# Patient Record
Sex: MALE | Race: WHITE | NOT HISPANIC OR LATINO | Employment: OTHER | URBAN - METROPOLITAN AREA
[De-identification: names, ages, dates, MRNs, and addresses within clinical notes are randomized per-mention and may not be internally consistent; named-entity substitution may affect disease eponyms.]

---

## 2017-07-27 ENCOUNTER — OFFICE VISIT (OUTPATIENT)
Dept: MEDICAL GROUP | Facility: MEDICAL CENTER | Age: 62
End: 2017-07-27
Payer: COMMERCIAL

## 2017-07-27 VITALS
HEIGHT: 77 IN | SYSTOLIC BLOOD PRESSURE: 126 MMHG | OXYGEN SATURATION: 98 % | BODY MASS INDEX: 27.28 KG/M2 | HEART RATE: 62 BPM | TEMPERATURE: 97.2 F | WEIGHT: 231 LBS | RESPIRATION RATE: 16 BRPM | DIASTOLIC BLOOD PRESSURE: 68 MMHG

## 2017-07-27 DIAGNOSIS — I77.819 AORTIC DILATATION (HCC): ICD-10-CM

## 2017-07-27 DIAGNOSIS — Z00.00 ROUTINE GENERAL MEDICAL EXAMINATION AT A HEALTH CARE FACILITY: ICD-10-CM

## 2017-07-27 DIAGNOSIS — H81.09 MENIERE DISEASE, UNSPECIFIED LATERALITY: ICD-10-CM

## 2017-07-27 PROCEDURE — 99214 OFFICE O/P EST MOD 30 MIN: CPT | Performed by: NURSE PRACTITIONER

## 2017-07-27 RX ORDER — MECLIZINE HYDROCHLORIDE 25 MG/1
25 TABLET ORAL 3 TIMES DAILY PRN
Qty: 30 TAB | Refills: 0 | Status: SHIPPED | DISCHARGE
Start: 2017-07-27 | End: 2019-08-01

## 2017-07-27 ASSESSMENT — ENCOUNTER SYMPTOMS: DIZZINESS: 1

## 2017-07-27 ASSESSMENT — PATIENT HEALTH QUESTIONNAIRE - PHQ9: CLINICAL INTERPRETATION OF PHQ2 SCORE: 0

## 2017-07-27 NOTE — PROGRESS NOTES
"Subjective:      Tyler Live is a 62 y.o. male who presents with Establish Care            HPI Tyler Live is here today to establish care as well as requesting lab work and follow-up on aortic dilatation.    Patient reports he feels generally well but it does not appear he has done any routine lab work in a number of years. He does have history of a 4.1 cm dilated aortic root and ascending aorta for which he was supposed to do follow-up echocardiogram 2 years ago but admits he did not do so. He has no history of hypertension or dyslipidemia. He states he generally feels well and the only chronic problem he has his Ménière's disease for which she has been seen at neurology and ENT. He will use meclizine when needed and symptoms tend to be worse with certain movements in bed. His neurological workup apparently was normal. He takes no other medications. He does not smoke cigarettes.      Social History   Substance Use Topics   • Smoking status: Never Smoker    • Smokeless tobacco: Never Used   • Alcohol Use: Yes      Comment: rare     Current Outpatient Prescriptions   Medication Sig Dispense Refill   • meclizine (ANTIVERT) 25 MG Tab Take 1 Tab by mouth 3 times a day as needed. 30 Tab 0     No current facility-administered medications for this visit.     Family History   Problem Relation Age of Onset   • Cancer Father      melanoma   • Cancer Sister      breast   • Cancer Maternal Grandmother      kidney   • Cancer Maternal Grandfather      prostate   • Cancer Paternal Grandmother      liver   • Cancer Paternal Grandfather      throat   • Diabetes Neg Hx    • Heart Disease Neg Hx    • Stroke Neg Hx    • GI Mother    History reviewed. No pertinent past medical history.    Review of Systems   Neurological: Positive for dizziness.   All other systems reviewed and are negative.         Objective:     /68 mmHg  Pulse 62  Temp(Src) 36.2 °C (97.2 °F)  Resp 16  Ht 1.956 m (6' 5\")  Wt 104.781 kg (231 lb)  " BMI 27.39 kg/m2  SpO2 98%     Physical Exam   Constitutional: He is oriented to person, place, and time. He appears well-developed and well-nourished. No distress.   HENT:   Head: Normocephalic and atraumatic.   Right Ear: External ear normal.   Left Ear: External ear normal.   Nose: Nose normal.   Mouth/Throat: Oropharynx is clear and moist.   Eyes: Conjunctivae are normal. Right eye exhibits no discharge. Left eye exhibits no discharge.   Neck: Normal range of motion. Neck supple. No tracheal deviation present. No thyromegaly present.   Cardiovascular: Normal rate, regular rhythm and normal heart sounds.    No murmur heard.  Pulmonary/Chest: Effort normal and breath sounds normal. No respiratory distress. He has no wheezes. He has no rales.   Lymphadenopathy:     He has no cervical adenopathy.   Neurological: He is alert and oriented to person, place, and time. Coordination normal.   Skin: Skin is warm and dry. No rash noted. He is not diaphoretic. No erythema.   Psychiatric: He has a normal mood and affect. His behavior is normal. Judgment and thought content normal.   Nursing note and vitals reviewed.            CONCLUSIONS  Normal left ventricular size, thickness, systolic function, and   diastolic function.  Left ventricular ejection fraction is 60-65%.  Mild tricuspid regurgitation.  Dilated aortic root and ascending aorta, measuring up to 4.1 cm in   diameter.  Assessment/Plan:     1. Aortic dilatation (CMS-HCC)  Patient has aortic dilatation at 4.1 cm and I explained this usually requires a follow-up ultrasound in one year which was ordered but patient did not complete. He states he will do at this time and if it is grown in size he will do follow-up testing depending on the size. I explained that if it is above 5.5 cm, he will be referred to a vascular surgeon. He will always keep his blood pressure under control.  - US-AORTA; Future    2. Meniere disease, unspecified laterality  Patient currently on  meclizine which helps somewhat but he admits he has are pending ENT and neurology and there is not much more they can do for him.  - meclizine (ANTIVERT) 25 MG Tab; Take 1 Tab by mouth 3 times a day as needed.  Dispense: 30 Tab; Refill: 0    3. Routine general medical examination at a health care facility  I recommended doing lab work and then following up if anything is abnormal.  - COMP METABOLIC PANEL; Future  - PROSTATE SPECIFIC AG SCREENING; Future  - LIPID PROFILE; Future  - TSH; Future

## 2017-07-27 NOTE — MR AVS SNAPSHOT
"        Tyler Live   2017 3:20 PM   Office Visit   MRN: [id number]    Department:  [address] [place] [address]   Dept Phone:  [phone]    Description:  Male : 1955   Provider:  ROLAND Mullins           Reason for Visit     Establish Care general check up      Allergies as of 2017     No Known Allergies      You were diagnosed with     Meniere disease, unspecified laterality   [719104]       Aortic dilatation (CMS-HCC)   [163637]       Routine general medical examination at a health care facility   [V70.0.ICD-9-CM]         Vital Signs     Blood Pressure Pulse Temperature Respirations Height Weight    126/68 mmHg 62 36.2 °C (97.2 °F) 16 1.956 m (6' 5\") 104.781 kg (231 lb)    Body Mass Index Oxygen Saturation Smoking Status             27.39 kg/m2 98% Never Smoker          Basic Information     Date Of Birth Sex Race Ethnicity Preferred Language    1955 Male White Non- English      Problem List              ICD-10-CM Priority Class Noted - Resolved    Aortic dilatation (CMS-HCC) I77.819   2015 - Present    Eczema L30.9   2015 - Present    Meniere disease H81.09   2017 - Present      Health Maintenance        Date Due Completion Dates    IMM DTaP/Tdap/Td Vaccine (1 - Tdap) 1974 ---    IMM ZOSTER VACCINE 2015 ---    IMM INFLUENZA (1) 2015    COLONOSCOPY 2021            Current Immunizations     Influenza Vaccine Quad Inj (Pf) 2015      Below and/or attached are the medications your provider expects you to take. Review all of your home medications and newly ordered medications with your provider and/or pharmacist. Follow medication instructions as directed by your provider and/or pharmacist. Please keep your medication list with you and share with your provider. Update the information when medications are discontinued, doses are changed, or new medications (including over-the-counter products) are added; and carry medication " information at all times in the event of emergency situations     Allergies:  No Known Allergies          Medications  Valid as of: July 27, 2017 -  3:43 PM    Generic Name Brand Name Tablet Size Instructions for use    Meclizine HCl (Tab) ANTIVERT 25 MG Take 1 Tab by mouth 3 times a day as needed.        .                 Medicines prescribed today were sent to:     [id number] - [place], [place] - [address]    [address] [place] [address]    Phone: [phone] Fax: [phone]    Open 24 Hours?: No      Medication refill instructions:       If your prescription bottle indicates you have medication refills left, it is not necessary to call your provider’s office. Please contact your pharmacy and they will refill your medication.    If your prescription bottle indicates you do not have any refills left, you may request refills at any time through one of the following ways: The online [employer] system (except Urgent Care), by calling your provider’s office, or by asking your pharmacy to contact your provider’s office with a refill request. Medication refills are processed only during regular business hours and may not be available until the next business day. Your provider may request additional information or to have a follow-up visit with you prior to refilling your medication.   *Please Note: Medication refills are assigned a new Rx number when refilled electronically. Your pharmacy may indicate that no refills were authorized even though a new prescription for the same medication is available at the pharmacy. Please request the medicine by name with the pharmacy before contacting your provider for a refill.        Your To Do List     Future Labs/Procedures Complete By Expires    COMP METABOLIC PANEL  As directed 7/28/2018    LIPID PROFILE  As directed 7/28/2018    PROSTATE SPECIFIC AG SCREENING  As directed 7/28/2018    TSH  As directed 7/28/2018    US-AORTA  As directed 1/27/2018         Braclet  Code: Activation code not generated  Current MyChart Status: Active

## 2017-08-04 LAB
ALBUMIN SERPL-MCNC: 4.2 G/DL (ref 3.6–4.8)
ALBUMIN/GLOB SERPL: 1.8 {RATIO} (ref 1.2–2.2)
ALP SERPL-CCNC: 60 IU/L (ref 39–117)
ALT SERPL-CCNC: 28 IU/L (ref 0–44)
AST SERPL-CCNC: 26 IU/L (ref 0–40)
BILIRUB SERPL-MCNC: 0.8 MG/DL (ref 0–1.2)
BUN SERPL-MCNC: 15 MG/DL (ref 8–27)
BUN/CREAT SERPL: 14 (ref 10–24)
CALCIUM SERPL-MCNC: 9 MG/DL (ref 8.6–10.2)
CHLORIDE SERPL-SCNC: 105 MMOL/L (ref 96–106)
CHOLEST SERPL-MCNC: 157 MG/DL (ref 100–199)
CO2 SERPL-SCNC: 23 MMOL/L (ref 18–29)
COMMENT 011824: ABNORMAL
CREAT SERPL-MCNC: 1.05 MG/DL (ref 0.76–1.27)
GLOBULIN SER CALC-MCNC: 2.3 G/DL (ref 1.5–4.5)
GLUCOSE SERPL-MCNC: 99 MG/DL (ref 65–99)
HDLC SERPL-MCNC: 34 MG/DL
LDLC SERPL CALC-MCNC: 90 MG/DL (ref 0–99)
POTASSIUM SERPL-SCNC: 4.4 MMOL/L (ref 3.5–5.2)
PROT SERPL-MCNC: 6.5 G/DL (ref 6–8.5)
PSA SERPL-MCNC: 1.5 NG/ML (ref 0–4)
SODIUM SERPL-SCNC: 143 MMOL/L (ref 134–144)
TRIGL SERPL-MCNC: 167 MG/DL (ref 0–149)
VLDLC SERPL CALC-MCNC: 33 MG/DL (ref 5–40)

## 2017-08-08 ENCOUNTER — HOSPITAL ENCOUNTER (OUTPATIENT)
Dept: RADIOLOGY | Facility: MEDICAL CENTER | Age: 62
End: 2017-08-08
Attending: NURSE PRACTITIONER
Payer: COMMERCIAL

## 2017-08-08 DIAGNOSIS — I77.819 AORTIC DILATATION (HCC): ICD-10-CM

## 2017-08-08 DIAGNOSIS — I77.810 DILATED AORTIC ROOT (HCC): ICD-10-CM

## 2017-08-08 PROCEDURE — 76775 US EXAM ABDO BACK WALL LIM: CPT

## 2017-08-10 DIAGNOSIS — I77.810 DILATED AORTIC ROOT (HCC): ICD-10-CM

## 2017-08-24 ENCOUNTER — HOSPITAL ENCOUNTER (OUTPATIENT)
Dept: CARDIOLOGY | Facility: MEDICAL CENTER | Age: 62
End: 2017-08-24
Attending: NURSE PRACTITIONER
Payer: COMMERCIAL

## 2017-08-24 DIAGNOSIS — I77.810 DILATED AORTIC ROOT (HCC): ICD-10-CM

## 2017-08-24 LAB
LV EJECT FRACT  99904: 65
LV EJECT FRACT MOD 2C 99903: 68.19
LV EJECT FRACT MOD 4C 99902: 65.31
LV EJECT FRACT MOD BP 99901: 66.79

## 2017-08-24 PROCEDURE — 93306 TTE W/DOPPLER COMPLETE: CPT

## 2017-08-24 PROCEDURE — 93306 TTE W/DOPPLER COMPLETE: CPT | Mod: 26 | Performed by: INTERNAL MEDICINE

## 2017-09-20 ENCOUNTER — RX ONLY (OUTPATIENT)
Age: 62
Setting detail: RX ONLY
End: 2017-09-20

## 2018-07-17 ENCOUNTER — OFFICE VISIT (OUTPATIENT)
Dept: MEDICAL GROUP | Facility: MEDICAL CENTER | Age: 63
End: 2018-07-17
Payer: COMMERCIAL

## 2018-07-17 VITALS
SYSTOLIC BLOOD PRESSURE: 120 MMHG | DIASTOLIC BLOOD PRESSURE: 72 MMHG | RESPIRATION RATE: 16 BRPM | HEART RATE: 75 BPM | TEMPERATURE: 97.7 F | WEIGHT: 231 LBS | OXYGEN SATURATION: 96 % | HEIGHT: 77 IN | BODY MASS INDEX: 27.28 KG/M2

## 2018-07-17 DIAGNOSIS — R22.31 MASS OF AXILLA, RIGHT: ICD-10-CM

## 2018-07-17 DIAGNOSIS — M54.2 NECK PAIN: ICD-10-CM

## 2018-07-17 DIAGNOSIS — M50.30 DDD (DEGENERATIVE DISC DISEASE), CERVICAL: ICD-10-CM

## 2018-07-17 DIAGNOSIS — I77.810 ASCENDING AORTA DILATATION (HCC): ICD-10-CM

## 2018-07-17 PROCEDURE — 99213 OFFICE O/P EST LOW 20 MIN: CPT | Performed by: NURSE PRACTITIONER

## 2018-07-17 ASSESSMENT — PATIENT HEALTH QUESTIONNAIRE - PHQ9: CLINICAL INTERPRETATION OF PHQ2 SCORE: 0

## 2018-07-17 ASSESSMENT — ENCOUNTER SYMPTOMS: NECK PAIN: 1

## 2018-07-17 NOTE — PROGRESS NOTES
Subjective:      Tyler Live is a 63 y.o. male who presents with Neck Pain (right side) and Shoulder Pain (right one)        CC: Patient here today for problems with neck pain and concerns about nodular area and right axilla.    HPI Tyler Live      1. Ascending aorta dilatation (HCC)  Previous imaging studies have shown slightly enlarged ascending aorta.    2. Neck pain  Patient reports that for about 4 weeks he has been having pain in his posterior neck into her right shoulder. The pain is mostly with standing up straight or walking and better at rest. He points to his posterior right side of his neck and posterior right shoulder as to where the pain occurs. It does not radiate down his arms and he reports no paresthesias or weakness of the arms. There is a MRI of the cervical spine in the chart from 2006 showing small central disc protrusions spinal stenosis and multilevel foraminal stenosis. He reports that he has back pain that comes and goes on occasion.    3. DDD (degenerative disc disease), cervical  Patient has history of degenerative disc disease of his lower and upper back for which he is not currently going to any specialty.    4. Mass of axilla, right  Patient states he has noticed on and off a nodular area and his right axilla. He states it does not cause him pain and he was seen in urgent care for this in the past but no further workup was done. He would like to assess the status of this.  Social History   Substance Use Topics   • Smoking status: Never Smoker   • Smokeless tobacco: Never Used   • Alcohol use Yes      Comment: rare     Current Outpatient Prescriptions   Medication Sig Dispense Refill   • meclizine (ANTIVERT) 25 MG Tab Take 1 Tab by mouth 3 times a day as needed. 30 Tab 0     No current facility-administered medications for this visit.      Family History   Problem Relation Age of Onset   • Cancer Father      melanoma   • GI Mother    • Cancer Sister      breast   •  "Cancer Maternal Grandmother      kidney   • Cancer Maternal Grandfather      prostate   • Cancer Paternal Grandmother      liver   • Cancer Paternal Grandfather      throat   • Diabetes Neg Hx    • Heart Disease Neg Hx    • Stroke Neg Hx    History reviewed. No pertinent past medical history.    Review of Systems   Musculoskeletal: Positive for neck pain.   All other systems reviewed and are negative.         Objective:     /72   Pulse 75   Temp 36.5 °C (97.7 °F)   Resp 16   Ht 1.956 m (6' 5\")   Wt 104.8 kg (231 lb)   SpO2 96%   BMI 27.39 kg/m²      Physical Exam   Constitutional: He is oriented to person, place, and time. He appears well-developed and well-nourished. No distress.   HENT:   Head: Normocephalic and atraumatic.   Right Ear: External ear normal.   Left Ear: External ear normal.   Nose: Nose normal.   Mouth/Throat: Oropharynx is clear and moist.   Eyes: Conjunctivae are normal. Right eye exhibits no discharge. Left eye exhibits no discharge.   Neck: Normal range of motion. Neck supple. No tracheal deviation present. No thyromegaly present.   Cardiovascular: Normal rate, regular rhythm and normal heart sounds.    No murmur heard.  Pulmonary/Chest: Effort normal and breath sounds normal. No respiratory distress. He has no wheezes. He has no rales.   Musculoskeletal:   Decreased range of motion of the neck with rotation, flexion and extension. Mild tenderness over the posterior neck and no shoulder tenderness. Patient fully able to elevate his arms bilaterally and there is no decreased strength of the arms bilaterally.   Lymphadenopathy:     He has no cervical adenopathy.   Neurological: He is alert and oriented to person, place, and time. Coordination normal.   Skin: Skin is warm and dry. No rash noted. He is not diaphoretic. No erythema.   Small, nontender nodular area of the right axilla.   Psychiatric: He has a normal mood and affect. His behavior is normal. Judgment and thought content " normal.   Nursing note and vitals reviewed.              Assessment/Plan:     1. Ascending aorta dilatation (HCC)  Patient previously showed aneurysmal dilatation of the mid abdomen aorta on ultrasound with echocardiogram from last year showing ascending aorta dilatation at 4.2 cm. His echocardiogram otherwise was fairly normal with ejection fraction of 65%. He is due for yearly follow-up.  - US-AORTA; Future    2. Neck pain  Patient demonstrates no weakness of the extremities so I will get him to physical therapy. If it does not improve I recommended physiatry. I will also order MRI in the future if necessary.  - REFERRAL TO PHYSICAL THERAPY Reason for Therapy: Eval/Treat/Report    3. DDD (degenerative disc disease), cervical  Patient to try physical therapy and follow up if no improvement.  - REFERRAL TO PHYSICAL THERAPY Reason for Therapy: Eval/Treat/Report    4. Mass of axilla, right  I will get ultrasound to rule out lipoma versus enlarged lymph node.  - US-BREAST COMPLETE-LEFT; Future

## 2018-07-30 DIAGNOSIS — I77.810 ASCENDING AORTA DILATATION (HCC): ICD-10-CM

## 2018-08-01 ENCOUNTER — HOSPITAL ENCOUNTER (OUTPATIENT)
Dept: RADIOLOGY | Facility: MEDICAL CENTER | Age: 63
End: 2018-08-01
Attending: NURSE PRACTITIONER
Payer: COMMERCIAL

## 2018-08-01 DIAGNOSIS — R22.31 MASS OF AXILLA, RIGHT: ICD-10-CM

## 2018-08-01 PROCEDURE — G0279 TOMOSYNTHESIS, MAMMO: HCPCS

## 2018-08-01 PROCEDURE — 76642 ULTRASOUND BREAST LIMITED: CPT | Mod: RT

## 2018-08-08 ENCOUNTER — PHYSICAL THERAPY (OUTPATIENT)
Dept: PHYSICAL THERAPY | Facility: REHABILITATION | Age: 63
End: 2018-08-08
Attending: NURSE PRACTITIONER
Payer: COMMERCIAL

## 2018-08-08 DIAGNOSIS — M50.30 DEGENERATION OF CERVICAL INTERVERTEBRAL DISC: ICD-10-CM

## 2018-08-08 DIAGNOSIS — M54.2 NECK PAIN: ICD-10-CM

## 2018-08-08 PROCEDURE — 97161 PT EVAL LOW COMPLEX 20 MIN: CPT

## 2018-08-08 PROCEDURE — 97012 MECHANICAL TRACTION THERAPY: CPT

## 2018-08-08 ASSESSMENT — ENCOUNTER SYMPTOMS
PAIN SCALE AT HIGHEST: 7
QUALITY: ACHING
QUALITY: TIGHT
PAIN TIMING: INTERMITTENT
PAIN SCALE: 1
PAIN SCALE AT LOWEST: 1

## 2018-08-08 NOTE — OP THERAPY EVALUATION
Outpatient Physical Therapy  INITIAL EVALUATION    [facility] Physical Therapy [address]  [address]  [place] [address]  Phone:  [phone]  Fax:  [phone]    Date of Evaluation: 2018    Patient: Tyler Live  YOB: 1955  MRN: [id number]     Referring Provider: ROLAND Mullins [facility] [address]  [place] [address]   Referring Diagnosis Neck pain [M54.2];DDD (degenerative disc disease), cervical [M50.30]     Time Calculation  Start time: 215  Stop time: 315 Time Calculation (min): 60 minutes     Physical Therapy Occurrence Codes    Date of onset of impairment:  18   Date physical therapy care plan established or reviewed:  18   Date physical therapy treatment started:  18          Chief Complaint: Neck Pain    Visit Diagnoses     ICD-10-CM   1. Neck pain M54.2   2. Degeneration of cervical intervertebral disc M50.30         Subjective   History of Present Illness:     Date of onset:  2018    History of chief complaint:  Pt started having sharp neck pain on right side about one and half months ago. 2 weeks of acute pain, then it started to improve with lifting some weights.   Pt denies N/T.  Pt states he's been sleeping on his right side for 2 years bc of BPPV when he rolls onto his left side.     Pain:     Current pain ratin    At best pain ratin    At worst pain ratin    Location:  Ct pain and upper trap pain    Quality:  Tight and aching    Pain timing:  Intermittent    Aggravating factors:  Bilateral rotations, right side lying, walking.    Relieving factors:  Sitting down, exercise.    Activity Tolerance:     Current activity tolerance / Recreational activities:  Runs a little, elliptical, UE machines 3-5x/week.     Work:   at [employer], 40 hours/week.     Social Support:     Lives in:  One-story house    Lives with:  Spouse    Hand Dominance:     Hand dominance:  Right    Diagnostic Tests:     No  diagnostic tests were previously performed      Treatments:     Treatments to date:  Exercise    Patient Goals:     Patient goals for therapy:  Improve mobility, eliminate pain.      No past medical history on file.  Past Surgical History:   Procedure Laterality Date   • COLONOSCOPY  age 50    neg   • EYE SURGERY  age 4    R eye,trauma       Precautions:       Objective   Observation and functional movement:  Head forward posture with kyphotic T/S.    Range of motion and strength:    Strength is within functional limits.    Cervical left rotation 45 degrees, right rotation 42 degrees, very minimal side bending bilat.     Sensation and reflexes:     Sensation is intact.      Reflexes are normal and symmetrical.    Palpation and joint mobility:     C5-7 PA's stiff +++, unilaterals at same level stiff.   T/S general stiff throughout.             Therapeutic Exercises (CPT 37755):     1. Foam roller : alt flexion, pec stretch, snow angels    2. [place]    Therapeutic Treatments and Modalities:     1. Mechanical Traction (CPT 97073), 18#/10# 60/20 with MHP    Time-based treatments/modalities:          Assessment, Response and Plan:   Impairments: abnormal or restricted ROM, impaired functional mobility and lacks appropriate home exercise program    Assessment details:  Mr. Live is a healthy 63 year old male with recent onset of C-spine pain with probable degenerative changes. Pt would benefit from skilled PT to address posture and mobility issues.   Prognosis: good    Goals:   Short Term Goals:   Pt able to demonstrate improved sitting and standing posture.  Pt independent with daily postural exercises.   Short term goal time span:  1-2 weeks      Long Term Goals:    Increase cervical rotation by 20-30 degrees.  Improve t/s mobility  Pt able to walk and exercise without neck pain  Neck disability to improve from 4 to 0.  Long term goal time span:  6-8 weeks    Plan:   Therapy options:  Physical therapy treatment to  continue  Planned therapy interventions:  E Stim Unattended (CPT 76738), Manual Therapy (CPT 01451), Mechanical Traction (CPT 18511) and Therapeutic Exercise (CPT 98525)  Other planned therapy interventions:  Consider dry needling  Frequency:  2x week  Duration in weeks:  6  Discussed with:  Patient      Functional Limitation G-Codes and Severity Modifiers  Neck Disability Total: 4             Referring provider co-signature:  I have reviewed this plan of care and my co-signature certifies the need for services.      Physician Signature: ________________________________ Date: ______________

## 2018-08-10 ENCOUNTER — HOSPITAL ENCOUNTER (OUTPATIENT)
Dept: CARDIOLOGY | Facility: MEDICAL CENTER | Age: 63
End: 2018-08-10
Attending: NURSE PRACTITIONER
Payer: COMMERCIAL

## 2018-08-10 DIAGNOSIS — I77.810 ASCENDING AORTA DILATATION (HCC): ICD-10-CM

## 2018-08-10 LAB
LV EJECT FRACT  99904: 65
LV EJECT FRACT MOD 2C 99903: 68.46
LV EJECT FRACT MOD 4C 99902: 63.97
LV EJECT FRACT MOD BP 99901: 66.28

## 2018-08-10 PROCEDURE — 93306 TTE W/DOPPLER COMPLETE: CPT

## 2018-08-10 PROCEDURE — 93306 TTE W/DOPPLER COMPLETE: CPT | Mod: 26 | Performed by: INTERNAL MEDICINE

## 2018-08-14 ENCOUNTER — PHYSICAL THERAPY (OUTPATIENT)
Dept: PHYSICAL THERAPY | Facility: REHABILITATION | Age: 63
End: 2018-08-14
Attending: NURSE PRACTITIONER
Payer: COMMERCIAL

## 2018-08-14 DIAGNOSIS — M50.30 DEGENERATION OF CERVICAL INTERVERTEBRAL DISC: ICD-10-CM

## 2018-08-14 DIAGNOSIS — M54.2 NECK PAIN: ICD-10-CM

## 2018-08-14 PROCEDURE — 97014 ELECTRIC STIMULATION THERAPY: CPT

## 2018-08-14 PROCEDURE — 97140 MANUAL THERAPY 1/> REGIONS: CPT

## 2018-08-14 PROCEDURE — 97110 THERAPEUTIC EXERCISES: CPT

## 2018-08-14 NOTE — OP THERAPY DAILY TREATMENT
Outpatient Physical Therapy  DAILY TREATMENT     [facility] Physical [address].  [address]  [place] [address]  Phone:  [phone]  Fax:  [phone]    Date: 08/14/2018    Patient: Tyler Live  YOB: 1955  MRN: [id number]     Time Calculation  Start time: 0415  Stop time: 0502 Time Calculation (min): 47 minutes     Chief Complaint: neck pain  Visit #: 2    SUBJECTIVE:  Pt states he has more pain with standing and he did some seated rows that hurt his neck and right shoulder.     OBJECTIVE:    Therapeutic Exercises (CPT 98557):     1. Foam roller: alt flexion, pec stretch, flasher    2. Mid rows, flasher    3. Prone angels, 2 x 10    4. Activator on wall    Therapeutic Treatments and Modalities:     1. Manual Therapy (CPT 60408), CT mobs and upper t/s rib mobs, and right 1st rib mobs    2. E Stim Unattended (CPT 91034), russian stim to ct junction with mHP    Time-based treatments/modalities:  Manual therapy minutes (CPT 35395): 15 minutes  Therapeutic exercise minutes (CPT 54808): 14 minutes       ASSESSMENT:   Mild increase in rotation post treatment(55 degrees)    PLAN/RECOMMENDATIONS:   Plan for treatment: therapy treatment to continue next visit.  Planned interventions for next visit: E-stim unattended (CPT 55816), manual therapy (CPT 17608), mechanical traction (CPT 02326) and therapeutic exercise (CPT 52877).

## 2018-08-17 ENCOUNTER — PHYSICAL THERAPY (OUTPATIENT)
Dept: PHYSICAL THERAPY | Facility: REHABILITATION | Age: 63
End: 2018-08-17
Attending: NURSE PRACTITIONER
Payer: COMMERCIAL

## 2018-08-17 DIAGNOSIS — M50.30 DEGENERATION OF CERVICAL INTERVERTEBRAL DISC: ICD-10-CM

## 2018-08-17 DIAGNOSIS — M54.2 NECK PAIN: ICD-10-CM

## 2018-08-17 PROCEDURE — 97140 MANUAL THERAPY 1/> REGIONS: CPT

## 2018-08-17 PROCEDURE — 97014 ELECTRIC STIMULATION THERAPY: CPT

## 2018-08-17 PROCEDURE — 97110 THERAPEUTIC EXERCISES: CPT

## 2018-08-17 NOTE — OP THERAPY DAILY TREATMENT
Outpatient Physical Therapy  DAILY TREATMENT     [facility] Physical [address].  [address]  [place] [address]  Phone:  [phone]  Fax:  [phone]    Date: 08/17/2018    Patient: Tyler Live  YOB: 1955  MRN: [id number]     Time Calculation  Start time: 0345  Stop time: 0431 Time Calculation (min): 46 minutes     Chief Complaint: neck pain  Visit #: 3    SUBJECTIVE:      OBJECTIVE:            Therapeutic Exercises (CPT 06474):     1. Foam roller: alt flexion, pec stretch, flasher    2. Mid rows, flasher    3. Prone angels, 2 x 10    4. Activator on wall    5. Box exercise, 2 x 10    Therapeutic Treatments and Modalities:     1. Manual Therapy (CPT 37782), CT mobs and upper t/s rib mobs, and right 1st rib mobs    2. E Stim Unattended (CPT 31046), russian stim to ct junction with mHP    Time-based treatments/modalities:  Manual therapy minutes (CPT 11058): 16 minutes  Therapeutic exercise minutes (CPT 94505): 14 minutes         ASSESSMENT:   Pt continues to have tightness in right upper trap and central CT pain.     PLAN/RECOMMENDATIONS:   Plan for treatment: therapy treatment to continue next visit.  Planned interventions for next visit: E-stim unattended (CPT 16790), manual therapy (CPT 04261), mechanical traction (CPT 17212) and therapeutic exercise (CPT 40447).pt will consider dry needling.

## 2018-08-21 ENCOUNTER — PHYSICAL THERAPY (OUTPATIENT)
Dept: PHYSICAL THERAPY | Facility: REHABILITATION | Age: 63
End: 2018-08-21
Attending: NURSE PRACTITIONER
Payer: COMMERCIAL

## 2018-08-21 DIAGNOSIS — M54.2 NECK PAIN: ICD-10-CM

## 2018-08-21 DIAGNOSIS — M50.30 DEGENERATION OF CERVICAL INTERVERTEBRAL DISC: ICD-10-CM

## 2018-08-21 PROCEDURE — 97014 ELECTRIC STIMULATION THERAPY: CPT

## 2018-08-21 PROCEDURE — 97140 MANUAL THERAPY 1/> REGIONS: CPT

## 2018-08-21 PROCEDURE — 97110 THERAPEUTIC EXERCISES: CPT

## 2018-08-21 NOTE — OP THERAPY DAILY TREATMENT
Outpatient Physical Therapy  DAILY TREATMENT     [facility] Physical [address]  [address]  [place] [address]  Phone:  [phone]  Fax:  [phone]    Date: 08/21/2018    Patient: Tyler Live  YOB: 1955  MRN: [id number]     Time Calculation  Start time: 0415  Stop time: 0502 Time Calculation (min): 47 minutes     Chief Complaint: neck pain    Visit #: 4    SUBJECTIVE:  Pt states the stimulation seemed to help some. Walking still makes my neck hurt.     OBJECTIVE:      Therapeutic Exercises (CPT 68873):     1. Foam roller: alt flexion, pec stretch, flasher    2. Mid rows, flasher    3. Prone angels on ball, 2 x 10    4. Activator on wall    5. Box exercise, 2 x 10    6. Reverse rows with trx, 3 x 10    Therapeutic Treatments and Modalities:     1. Manual Therapy (CPT 77713), CT mobs and upper t/s rib mobs, and right 1st rib mobs    2. E Stim Unattended (CPT 66610), russian stim to ct junction with mHP    Time-based treatments/modalities:  Manual therapy minutes (CPT 27539): 14 minutes  Therapeutic exercise minutes (CPT 23033): 16 minutes         ASSESSMENT:   pt's making gradual improvement with treatment.     PLAN/RECOMMENDATIONS:   Plan for treatment: therapy treatment to continue next visit.  Planned interventions for next visit: E-stim unattended (CPT 78249), manual therapy (CPT 28170) and mechanical traction (CPT 27226).

## 2018-08-24 ENCOUNTER — PHYSICAL THERAPY (OUTPATIENT)
Dept: PHYSICAL THERAPY | Facility: REHABILITATION | Age: 63
End: 2018-08-24
Attending: NURSE PRACTITIONER
Payer: COMMERCIAL

## 2018-08-24 DIAGNOSIS — M54.2 NECK PAIN: ICD-10-CM

## 2018-08-24 DIAGNOSIS — M50.30 DEGENERATION OF CERVICAL INTERVERTEBRAL DISC: ICD-10-CM

## 2018-08-24 PROCEDURE — 97140 MANUAL THERAPY 1/> REGIONS: CPT

## 2018-08-24 PROCEDURE — 97110 THERAPEUTIC EXERCISES: CPT

## 2018-08-24 PROCEDURE — 97014 ELECTRIC STIMULATION THERAPY: CPT

## 2018-08-24 NOTE — OP THERAPY DAILY TREATMENT
Outpatient Physical Therapy  DAILY TREATMENT     [facility] Physical [address].  [address]  [place] [address]  Phone:  [phone]  Fax:  [phone]    Date: 08/24/2018    Patient: Tyler Live  YOB: 1955  MRN: [id number]     Time Calculation  Start time: 0345  Stop time: 0432 Time Calculation (min): 47 minutes     Chief Complaint: neck pain  Visit #: 5    SUBJECTIVE:  My neck is a little better. The elliptical hurt my neck after 25 minutes.     OBJECTIVE:      Therapeutic Exercises (CPT 99036):     1. Foam roller: alt flexion, pec stretch, flasher    2. Horizontal abduction    3. Prone angels, 2 x 10    4. Elbows and knees HBB t/s rotation    5. Box exercise, 2 x 10    6. [place] on the wall    Therapeutic Treatments and Modalities:     1. Manual Therapy (CPT 53212), CT mobs and upper t/s rib mobs, and right 1st rib mobs    2. E Stim Unattended (CPT 39920), russian stim to ct junction with P    Time-based treatments/modalities:  Manual therapy minutes (CPT 97213): 14 minutes  Therapeutic exercise minutes (CPT 58885): 16 minutes    ASSESSMENT:   pt's making some improvement with treatment.     PLAN/RECOMMENDATIONS:   Plan for treatment: therapy treatment to continue next visit.  Planned interventions for next visit: E-stim unattended (CPT 50535), manual therapy (CPT 82727) and therapeutic exercise (CPT 58981).

## 2018-08-28 ENCOUNTER — PHYSICAL THERAPY (OUTPATIENT)
Dept: PHYSICAL THERAPY | Facility: REHABILITATION | Age: 63
End: 2018-08-28
Attending: NURSE PRACTITIONER
Payer: COMMERCIAL

## 2018-08-28 DIAGNOSIS — M54.2 NECK PAIN: ICD-10-CM

## 2018-08-28 DIAGNOSIS — M50.30 DEGENERATION OF CERVICAL INTERVERTEBRAL DISC: ICD-10-CM

## 2018-08-28 PROCEDURE — 97140 MANUAL THERAPY 1/> REGIONS: CPT

## 2018-08-28 PROCEDURE — 97014 ELECTRIC STIMULATION THERAPY: CPT

## 2018-08-28 PROCEDURE — 97110 THERAPEUTIC EXERCISES: CPT

## 2018-08-28 NOTE — OP THERAPY DAILY TREATMENT
Outpatient Physical Therapy  DAILY TREATMENT     [facility] [address].  [address]  [place] [address]  Phone:  [phone]  Fax:  [phone]    Date: 08/28/2018    Patient: Tyler Live  YOB: 1955  MRN: [id number]     Time Calculation  Start time: 0415  Stop time: 0502 Time Calculation (min): 47 minutes     Chief Complaint: neck pain  Visit #: 6    SUBJECTIVE:  Pt did some work on his house and it flared his neck up.     OBJECTIVE:            Therapeutic Exercises (CPT 11340):     1. Foam roller: alt flexion, pec stretch, flasher    2. Horizontal abduction    3. Prone angels, 2 x 10    4. Elbows and knees HBB t/s rotation    5. Box exercise, 2 x 10    Therapeutic Treatments and Modalities:     1. Manual Therapy (CPT 43281), CT mobs and upper t/s rib mobs, and right 1st rib mobs, taped shoulders into retraction    2. E Stim Unattended (CPT 21878), russian stim to ct junction with mHP    Time-based treatments/modalities:  Manual therapy minutes (CPT 02728): 15 minutes  Therapeutic exercise minutes (CPT 10655): 15 minutes     ASSESSMENT:   Pt continues to get int pain which i suspect is posturally related.     PLAN/RECOMMENDATIONS:   Plan for treatment: therapy treatment to continue next visit.  Planned interventions for next visit: E-stim unattended (CPT 50513), manual therapy (CPT 27416), mechanical traction (CPT 58965) and therapeutic exercise (CPT 41741).

## 2018-08-31 ENCOUNTER — PHYSICAL THERAPY (OUTPATIENT)
Dept: PHYSICAL THERAPY | Facility: REHABILITATION | Age: 63
End: 2018-08-31
Attending: NURSE PRACTITIONER
Payer: COMMERCIAL

## 2018-08-31 DIAGNOSIS — M54.2 NECK PAIN: ICD-10-CM

## 2018-08-31 DIAGNOSIS — M50.30 DEGENERATION OF CERVICAL INTERVERTEBRAL DISC: ICD-10-CM

## 2018-08-31 PROCEDURE — 97014 ELECTRIC STIMULATION THERAPY: CPT

## 2018-08-31 PROCEDURE — 97110 THERAPEUTIC EXERCISES: CPT

## 2018-08-31 PROCEDURE — 97140 MANUAL THERAPY 1/> REGIONS: CPT

## 2018-08-31 NOTE — OP THERAPY DAILY TREATMENT
Outpatient Physical Therapy  DAILY TREATMENT     [facility] [address]  [address]  [place] [address]  Phone:  [phone]  Fax:  [phone]    Date: 08/31/2018    Patient: Tyler Live  YOB: 1955  MRN: [id number]     Time Calculation  Start time: 0145  Stop time: 0232 Time Calculation (min): 47 minutes     Chief Complaint: neck pain  Visit #: 7    SUBJECTIVE:  Pt  States the tape helped with walking. When I took it off the pain returned.     OBJECTIVE:    Therapeutic Exercises (CPT 49988):     1. Foam roller: alt flexion, pec stretch, flasher    2. Horizontal abduction    3. Prone angels, 2 x 10    4. Elbows and knees HBB t/s rotation    5. Box exercise, 2 x 10    Therapeutic Treatments and Modalities:     1. Manual Therapy (CPT 57391), CT mobs and upper t/s rib mobs, and right 1st rib mobs,     2. E Stim Unattended (CPT 71410), russian stim to ct junction with mHP    Time-based treatments/modalities:  Manual therapy minutes (CPT 79498): 16 minutes  Therapeutic exercise minutes (CPT 62276): 14 minutes     ASSESSMENT:   Pt did well with tape and reinforcing good posture.     PLAN/RECOMMENDATIONS:   Plan for treatment: therapy treatment to continue next visit.  Patient will follow up with treatment after he returns from vacation in 2 weeks.

## 2018-09-04 ENCOUNTER — OFFICE VISIT (OUTPATIENT)
Dept: MEDICAL GROUP | Facility: PHYSICIAN GROUP | Age: 63
End: 2018-09-04
Payer: COMMERCIAL

## 2018-09-04 VITALS
TEMPERATURE: 98.1 F | SYSTOLIC BLOOD PRESSURE: 120 MMHG | DIASTOLIC BLOOD PRESSURE: 70 MMHG | HEART RATE: 61 BPM | OXYGEN SATURATION: 95 % | HEIGHT: 77 IN | WEIGHT: 229.79 LBS | BODY MASS INDEX: 27.13 KG/M2

## 2018-09-04 DIAGNOSIS — L82.1 SEBORRHEIC KERATOSES: ICD-10-CM

## 2018-09-04 DIAGNOSIS — D48.5 NEOPLASM OF UNCERTAIN BEHAVIOR OF SKIN: ICD-10-CM

## 2018-09-04 DIAGNOSIS — Z23 NEED FOR INFLUENZA VACCINATION: ICD-10-CM

## 2018-09-04 PROCEDURE — 99213 OFFICE O/P EST LOW 20 MIN: CPT | Mod: 25 | Performed by: FAMILY MEDICINE

## 2018-09-04 PROCEDURE — 90471 IMMUNIZATION ADMIN: CPT | Performed by: FAMILY MEDICINE

## 2018-09-04 PROCEDURE — 90686 IIV4 VACC NO PRSV 0.5 ML IM: CPT | Performed by: FAMILY MEDICINE

## 2018-09-05 NOTE — PROGRESS NOTES
"Subjective:      Tyler Live is a 63 y.o. male who presents with Other (nhung on back)            HPI     This is a 63-year-old white male patient of CHETAN Mcguire who is here because his wife has noticed suspicious spots on his back in the last 1-2 months.  He is worried because his father had melanoma at age 67 and  5 months after the diagnosis.  He follows up with skin cancer and dermat[facility] and he said he has an appointment in 2 months time.  He wants to make sure there is nothing urgent that needs to be treated.    He is otherwise healthy without any significant medical problems.    Past medical history, past surgical history, family history reviewed-no changes    Social history reviewed-no changes    Allergies reviewed-no changes    Medications reviewed-no changes    ROS     As per HPI, the rest are negative.       Objective:     /70   Pulse 61   Temp 36.7 °C (98.1 °F)   Ht 1.956 m (6' 5\")   Wt 104.2 kg (229 lb 12.6 oz)   SpO2 95%   BMI 27.25 kg/m²      Physical Exam     Examined alert, awake, oriented, not in distress    Skin- he has several seborrheic keratosis lesions on his back with different shapes, sizes and color, no skin lesions in the midthoracic area of the back which are [id number] one 6 mm in size irregular shape and one next to it 3 mm in size round distinct margin both pinkish in color and shiny           Assessment/Plan:     1. Neoplasm of uncertain behavior of skin  Suspicious lesions on the mid back probably basal cell carcinoma.  Keep appointment with dermatologist in 2 months to get this further evaluated.  I do not see any lesions that are suspicious for melanoma that need urgent attention.  We discussed the ABCDE of skin lesions.  Discussed the need to use sunscreen on all sun exposed areas.    2. Seborrheic keratoses  Reassured patient the multiple skin lesions covering the back are benign seborrheic keratosis.     3. Need for influenza " vaccination  Flu shot was given today.  - INFLUENZA VACCINE QUAD INJ >3Y(PF)      Please note that this dictation was created using voice recognition software. I have worked with consultants from the vendor as well as technical experts from [facility] to optimize the interface. I have made every reasonable attempt to correct obvious errors, but I expect that there are errors of grammar and possibly content I did not discover before finalizing the note.

## 2018-09-24 ENCOUNTER — APPOINTMENT (RX ONLY)
Dept: URBAN - METROPOLITAN AREA CLINIC 20 | Facility: CLINIC | Age: 63
Setting detail: DERMATOLOGY
End: 2018-09-24

## 2018-09-24 DIAGNOSIS — L82.1 OTHER SEBORRHEIC KERATOSIS: ICD-10-CM

## 2018-09-24 DIAGNOSIS — Z71.89 OTHER SPECIFIED COUNSELING: ICD-10-CM

## 2018-09-24 DIAGNOSIS — L20.89 OTHER ATOPIC DERMATITIS: ICD-10-CM

## 2018-09-24 DIAGNOSIS — Z85.828 PERSONAL HISTORY OF OTHER MALIGNANT NEOPLASM OF SKIN: ICD-10-CM

## 2018-09-24 DIAGNOSIS — L81.4 OTHER MELANIN HYPERPIGMENTATION: ICD-10-CM

## 2018-09-24 DIAGNOSIS — L21.8 OTHER SEBORRHEIC DERMATITIS: ICD-10-CM

## 2018-09-24 DIAGNOSIS — D18.0 HEMANGIOMA: ICD-10-CM

## 2018-09-24 DIAGNOSIS — D22 MELANOCYTIC NEVI: ICD-10-CM

## 2018-09-24 PROBLEM — L20.84 INTRINSIC (ALLERGIC) ECZEMA: Status: ACTIVE | Noted: 2018-09-24

## 2018-09-24 PROBLEM — L57.0 ACTINIC KERATOSIS: Status: ACTIVE | Noted: 2018-09-24

## 2018-09-24 PROBLEM — D22.39 MELANOCYTIC NEVI OF OTHER PARTS OF FACE: Status: ACTIVE | Noted: 2018-09-24

## 2018-09-24 PROBLEM — D22.71 MELANOCYTIC NEVI OF RIGHT LOWER LIMB, INCLUDING HIP: Status: ACTIVE | Noted: 2018-09-24

## 2018-09-24 PROBLEM — D22.5 MELANOCYTIC NEVI OF TRUNK: Status: ACTIVE | Noted: 2018-09-24

## 2018-09-24 PROBLEM — D22.62 MELANOCYTIC NEVI OF LEFT UPPER LIMB, INCLUDING SHOULDER: Status: ACTIVE | Noted: 2018-09-24

## 2018-09-24 PROBLEM — D22.61 MELANOCYTIC NEVI OF RIGHT UPPER LIMB, INCLUDING SHOULDER: Status: ACTIVE | Noted: 2018-09-24

## 2018-09-24 PROBLEM — D22.4 MELANOCYTIC NEVI OF SCALP AND NECK: Status: ACTIVE | Noted: 2018-09-24

## 2018-09-24 PROBLEM — D22.72 MELANOCYTIC NEVI OF LEFT LOWER LIMB, INCLUDING HIP: Status: ACTIVE | Noted: 2018-09-24

## 2018-09-24 PROBLEM — D18.01 HEMANGIOMA OF SKIN AND SUBCUTANEOUS TISSUE: Status: ACTIVE | Noted: 2018-09-24

## 2018-09-24 PROCEDURE — ? COUNSELING

## 2018-09-24 PROCEDURE — ? OBSERVATION AND MEASURE

## 2018-09-24 PROCEDURE — ? PRESCRIPTION

## 2018-09-24 PROCEDURE — ? SUNSCREEN RECOMMENDATIONS

## 2018-09-24 PROCEDURE — 99214 OFFICE O/P EST MOD 30 MIN: CPT

## 2018-09-24 PROCEDURE — ? OBSERVATION

## 2018-09-24 RX ORDER — CLOBETASOL PROPIONATE 0.5 MG/G
CREAM TOPICAL BID
Qty: 1 | Refills: 3 | Status: ERX | COMMUNITY
Start: 2018-09-24

## 2018-09-24 RX ORDER — KETOCONAZOLE 20 MG/G
CREAM TOPICAL QD
Qty: 1 | Refills: 3 | Status: ERX | COMMUNITY
Start: 2018-09-24

## 2018-09-24 RX ORDER — CLOBETASOL PROPIONATE 0.5 MG/ML
SOLUTION TOPICAL
Qty: 1 | Refills: 2 | Status: ERX | COMMUNITY
Start: 2018-09-24

## 2018-09-24 RX ADMIN — CLOBETASOL PROPIONATE THIN LAYER: 0.5 CREAM TOPICAL at 00:00

## 2018-09-24 RX ADMIN — KETOCONAZOLE THIN LAYER: 20 CREAM TOPICAL at 00:00

## 2018-09-24 RX ADMIN — CLOBETASOL PROPIONATE SM AMT: 0.5 SOLUTION TOPICAL at 00:00

## 2018-09-24 ASSESSMENT — LOCATION DETAILED DESCRIPTION DERM
LOCATION DETAILED: RIGHT DISTAL POSTERIOR THIGH
LOCATION DETAILED: RIGHT DISTAL POSTERIOR UPPER ARM
LOCATION DETAILED: RIGHT VENTRAL PROXIMAL FOREARM
LOCATION DETAILED: RIGHT SUPERIOR UPPER BACK
LOCATION DETAILED: LEFT DISTAL POSTERIOR THIGH
LOCATION DETAILED: LEFT VENTRAL PROXIMAL FOREARM
LOCATION DETAILED: LEFT CRUS OF HELIX
LOCATION DETAILED: RIGHT INFERIOR MEDIAL MIDBACK
LOCATION DETAILED: INFERIOR THORACIC SPINE
LOCATION DETAILED: RIGHT PROXIMAL PRETIBIAL REGION
LOCATION DETAILED: RIGHT INFERIOR OCCIPITAL SCALP
LOCATION DETAILED: LEFT LATERAL PROXIMAL PRETIBIAL REGION
LOCATION DETAILED: LEFT PROXIMAL POSTERIOR UPPER ARM
LOCATION DETAILED: SUPERIOR THORACIC SPINE
LOCATION DETAILED: RIGHT INFERIOR CENTRAL MALAR CHEEK
LOCATION DETAILED: RIGHT INFERIOR MEDIAL UPPER BACK
LOCATION DETAILED: RIGHT INFERIOR FOREHEAD
LOCATION DETAILED: LEFT DISTAL PRETIBIAL REGION

## 2018-09-24 ASSESSMENT — LOCATION SIMPLE DESCRIPTION DERM
LOCATION SIMPLE: UPPER BACK
LOCATION SIMPLE: SCALP
LOCATION SIMPLE: RIGHT CHEEK
LOCATION SIMPLE: RIGHT POSTERIOR THIGH
LOCATION SIMPLE: LEFT PRETIBIAL REGION
LOCATION SIMPLE: LEFT POSTERIOR UPPER ARM
LOCATION SIMPLE: LEFT EAR
LOCATION SIMPLE: LEFT FOREARM
LOCATION SIMPLE: LEFT POSTERIOR THIGH
LOCATION SIMPLE: RIGHT PRETIBIAL REGION
LOCATION SIMPLE: RIGHT POSTERIOR UPPER ARM
LOCATION SIMPLE: RIGHT UPPER BACK
LOCATION SIMPLE: RIGHT FOREARM
LOCATION SIMPLE: RIGHT LOWER BACK
LOCATION SIMPLE: RIGHT FOREHEAD

## 2018-09-24 ASSESSMENT — LOCATION ZONE DERM
LOCATION ZONE: SCALP
LOCATION ZONE: FACE
LOCATION ZONE: TRUNK
LOCATION ZONE: EAR
LOCATION ZONE: ARM
LOCATION ZONE: LEG

## 2018-09-24 NOTE — PROCEDURE: OBSERVATION
Detail Level: Zone
Size Of Lesion In Cm (Optional): 0
Body Location Override (Optional - Billing Will Still Be Based On Selected Body Map Location If Applicable): back
Detail Level: Detailed
Size Of Lesion: 3mm x 3mm

## 2018-10-10 ENCOUNTER — TELEPHONE (OUTPATIENT)
Dept: PHYSICAL THERAPY | Facility: REHABILITATION | Age: 63
End: 2018-10-10

## 2018-12-12 ENCOUNTER — OFFICE VISIT (OUTPATIENT)
Dept: MEDICAL GROUP | Facility: MEDICAL CENTER | Age: 63
End: 2018-12-12
Payer: COMMERCIAL

## 2018-12-12 ENCOUNTER — HOSPITAL ENCOUNTER (OUTPATIENT)
Dept: RADIOLOGY | Facility: MEDICAL CENTER | Age: 63
End: 2018-12-12
Attending: NURSE PRACTITIONER
Payer: COMMERCIAL

## 2018-12-12 VITALS
HEIGHT: 77 IN | HEART RATE: 76 BPM | SYSTOLIC BLOOD PRESSURE: 118 MMHG | OXYGEN SATURATION: 99 % | BODY MASS INDEX: 27.87 KG/M2 | WEIGHT: 236 LBS | DIASTOLIC BLOOD PRESSURE: 68 MMHG | TEMPERATURE: 97 F | RESPIRATION RATE: 12 BRPM

## 2018-12-12 DIAGNOSIS — M54.41 CHRONIC RIGHT-SIDED LOW BACK PAIN WITH RIGHT-SIDED SCIATICA: ICD-10-CM

## 2018-12-12 DIAGNOSIS — G47.01 INSOMNIA DUE TO MEDICAL CONDITION: ICD-10-CM

## 2018-12-12 DIAGNOSIS — G89.29 CHRONIC RIGHT-SIDED LOW BACK PAIN WITH RIGHT-SIDED SCIATICA: ICD-10-CM

## 2018-12-12 DIAGNOSIS — Z23 NEED FOR TDAP VACCINATION: ICD-10-CM

## 2018-12-12 PROCEDURE — 73521 X-RAY EXAM HIPS BI 2 VIEWS: CPT

## 2018-12-12 PROCEDURE — 99214 OFFICE O/P EST MOD 30 MIN: CPT | Mod: 25 | Performed by: NURSE PRACTITIONER

## 2018-12-12 PROCEDURE — 90715 TDAP VACCINE 7 YRS/> IM: CPT | Performed by: NURSE PRACTITIONER

## 2018-12-12 PROCEDURE — 90471 IMMUNIZATION ADMIN: CPT | Performed by: NURSE PRACTITIONER

## 2018-12-12 PROCEDURE — 72100 X-RAY EXAM L-S SPINE 2/3 VWS: CPT

## 2018-12-12 RX ORDER — METHYLPREDNISOLONE 4 MG/1
TABLET ORAL
Qty: 21 TAB | Refills: 0 | Status: SHIPPED | OUTPATIENT
Start: 2018-12-12 | End: 2019-01-10

## 2018-12-12 RX ORDER — TRAZODONE HYDROCHLORIDE 50 MG/1
25-50 TABLET ORAL
Qty: 30 TAB | Refills: 5 | Status: SHIPPED | OUTPATIENT
Start: 2018-12-12 | End: 2019-01-23

## 2018-12-12 ASSESSMENT — ENCOUNTER SYMPTOMS
INSOMNIA: 1
BACK PAIN: 1

## 2018-12-12 NOTE — PROGRESS NOTES
"Subjective:      Tyler Live is a 63 y.o. male who presents with Hip Pain (4 months)        CC: Patient here today for complaints of right \"hip\" pain and insomnia.    HPI Tyler Live      1. Chronic right-sided low back pain with right-sided sciatica  Patient states for about 5 months he has had pain and what he feels is the right hip although he points also to the right gluteal area.  He states the pain almost always occurs at night when he lays on area.  There is no pain with activity.  He has recently been to physical therapy for his neck pain with some relief of symptoms it did not mention his lower back pain.  He denies loss of bowel or bladder control.  He denies weakness or tingling of the extremities.    2. Insomnia due to medical condition  Patient states he has had problems with insomnia for years.  He often takes melatonin or over-the-counter sleep aids.  He feels it has worsened with his hip pain.  He states he often falls asleep okay but wakes up during the night.  He states he does not feel he has sleep apnea symptoms which we discussed.    3. Need for Tdap vaccination  Patient due for vaccine.  Current Outpatient Prescriptions   Medication Sig Dispense Refill   • MethylPREDNISolone (MEDROL DOSEPAK) 4 MG Tablet Therapy Pack As directed on the packaging label. 21 Tab 0   • traZODone (DESYREL) 50 MG Tab Take 0.5-1 Tabs by mouth every bedtime. 30 Tab 5   • meclizine (ANTIVERT) 25 MG Tab Take 1 Tab by mouth 3 times a day as needed. 30 Tab 0     No current facility-administered medications for this visit.      Social History   Substance Use Topics   • Smoking status: Never Smoker   • Smokeless tobacco: Never Used   • Alcohol use Yes      Comment: rare     Family History   Problem Relation Age of Onset   • Cancer Father         melanoma   • GI Mother    • Cancer Sister         breast   • Cancer Maternal Grandmother         kidney   • Cancer Maternal Grandfather         prostate   • Cancer " "Paternal Grandmother         liver   • Cancer Paternal Grandfather         throat   • Diabetes Neg Hx    • Heart Disease Neg Hx    • Stroke Neg Hx    History reviewed. No pertinent past medical history.    Review of Systems   Musculoskeletal: Positive for back pain.   Psychiatric/Behavioral: The patient has insomnia.    All other systems reviewed and are negative.         Objective:     /68   Pulse 76   Temp 36.1 °C (97 °F)   Resp 12   Ht 1.956 m (6' 5\")   Wt 107 kg (236 lb)   SpO2 99%   BMI 27.99 kg/m²      Physical Exam   Constitutional: He is oriented to person, place, and time. He appears well-developed and well-nourished. No distress.   HENT:   Head: Normocephalic and atraumatic.   Right Ear: External ear normal.   Left Ear: External ear normal.   Nose: Nose normal.   Mouth/Throat: Oropharynx is clear and moist.   Eyes: Conjunctivae are normal. Right eye exhibits no discharge. Left eye exhibits no discharge.   Neck: Normal range of motion. Neck supple. No tracheal deviation present. No thyromegaly present.   Cardiovascular: Normal rate, regular rhythm and normal heart sounds.    No murmur heard.  Pulmonary/Chest: Effort normal and breath sounds normal. No respiratory distress. He has no wheezes. He has no rales.   Musculoskeletal:   Patient points to his right hip and right lower back area as to where his pain occurs.  There is no pain with pressure on the area or movement of the neck and hips.  Full strength of lower extremities.   Lymphadenopathy:     He has no cervical adenopathy.   Neurological: He is alert and oriented to person, place, and time. Coordination normal.   Skin: Skin is warm and dry. No rash noted. He is not diaphoretic. No erythema.   Psychiatric: He has a normal mood and affect. His behavior is normal. Judgment and thought content normal.   Nursing note and vitals reviewed.              Assessment/Plan:     1. Chronic right-sided low back pain with right-sided sciatica  I will " get x-rays of the hip and lumbar spine and explained his pain may be more sciatica related.  He will stop wearing his wallet in his right pocket.  Because symptoms have been going on for 5 months I am going to refer him to physiatry.  He has already been to physical therapy for his neck and does not feel it would be helpful.  I will put him on a Medrol Dosepak for 5 days and then he can use Tylenol for pain.  I am also putting him on trazodone for sleep which may help with the pain as well.  He will go to the emergency room if he develops weakness of the extremities or problems with bowel or bladder.  - REFERRAL TO PHYSIATRY (PMR)  - DX-HIP-BILATERAL-WITH PELVIS-2 VIEWS; Future  - DX-LUMBAR SPINE-2 OR 3 VIEWS; Future  - MethylPREDNISolone (MEDROL DOSEPAK) 4 MG Tablet Therapy Pack; As directed on the packaging label.  Dispense: 21 Tab; Refill: 0    2. Insomnia due to medical condition  Patient was given a handout on sleep aids and ways to help with sleep.  He does not feel he has sleep apnea but I did advise him to consider a referral to the sleep center if symptoms do not improve.  He may try trazodone at 1/2-1 tablet/day.  - traZODone (DESYREL) 50 MG Tab; Take 0.5-1 Tabs by mouth every bedtime.  Dispense: 30 Tab; Refill: 5    3. Need for Tdap vaccination  I have placed the below orders and discussed them with an approved delegating provider. The MA is performing the below orders under the direction of Dr. Rodriguez    - Tdap =>8yo IM

## 2019-01-10 ENCOUNTER — OFFICE VISIT (OUTPATIENT)
Dept: PHYSICAL MEDICINE AND REHAB | Facility: MEDICAL CENTER | Age: 64
End: 2019-01-10
Payer: COMMERCIAL

## 2019-01-10 VITALS
DIASTOLIC BLOOD PRESSURE: 78 MMHG | SYSTOLIC BLOOD PRESSURE: 110 MMHG | OXYGEN SATURATION: 95 % | BODY MASS INDEX: 27.49 KG/M2 | WEIGHT: 232.81 LBS | TEMPERATURE: 97.5 F | HEIGHT: 77 IN | HEART RATE: 64 BPM

## 2019-01-10 DIAGNOSIS — M25.652 DECREASED RANGE OF MOTION OF BOTH HIPS: ICD-10-CM

## 2019-01-10 DIAGNOSIS — M25.551 CHRONIC PAIN OF RIGHT HIP: ICD-10-CM

## 2019-01-10 DIAGNOSIS — G89.29 CHRONIC PAIN OF RIGHT HIP: ICD-10-CM

## 2019-01-10 DIAGNOSIS — M25.651 DECREASED RANGE OF MOTION OF BOTH HIPS: ICD-10-CM

## 2019-01-10 DIAGNOSIS — M70.61 GREATER TROCHANTERIC BURSITIS OF RIGHT HIP: ICD-10-CM

## 2019-01-10 DIAGNOSIS — M16.0 BILATERAL HIP JOINT ARTHRITIS: ICD-10-CM

## 2019-01-10 DIAGNOSIS — H81.09 MENIERE'S DISEASE, UNSPECIFIED LATERALITY: ICD-10-CM

## 2019-01-10 PROCEDURE — 99205 OFFICE O/P NEW HI 60 MIN: CPT | Performed by: PHYSICAL MEDICINE & REHABILITATION

## 2019-01-10 RX ORDER — MELOXICAM 7.5 MG/1
15 TABLET ORAL DAILY
Qty: 60 TAB | Refills: 0 | Status: SHIPPED | OUTPATIENT
Start: 2019-01-10 | End: 2019-01-23

## 2019-01-10 ASSESSMENT — PAIN SCALES - GENERAL: PAINLEVEL: 4=SLIGHT-MODERATE PAIN

## 2019-01-10 ASSESSMENT — PATIENT HEALTH QUESTIONNAIRE - PHQ9: CLINICAL INTERPRETATION OF PHQ2 SCORE: 0

## 2019-01-10 NOTE — Clinical Note
Dear Abdirahman Farias, A.HILDA. ,   Thank you for the referral of Tyler Echeverria Calos.  I believe his primary issue is greater trochanteric syndrome superimposed on hip impingement syndrome and hip arthritis.  I have initiated treatment and I will continue to follow-up with the patient.  Please see my note for more details    Should you have any questions or concerns please do not hesitate to contact me.  Mert Onofre M.D.

## 2019-01-21 ENCOUNTER — APPOINTMENT (OUTPATIENT)
Dept: PHYSICAL MEDICINE AND REHAB | Facility: MEDICAL CENTER | Age: 64
End: 2019-01-21
Payer: COMMERCIAL

## 2019-01-23 ENCOUNTER — OFFICE VISIT (OUTPATIENT)
Dept: URGENT CARE | Facility: CLINIC | Age: 64
End: 2019-01-23
Payer: COMMERCIAL

## 2019-01-23 VITALS
BODY MASS INDEX: 27.84 KG/M2 | SYSTOLIC BLOOD PRESSURE: 126 MMHG | TEMPERATURE: 97.1 F | OXYGEN SATURATION: 96 % | DIASTOLIC BLOOD PRESSURE: 86 MMHG | HEIGHT: 77 IN | HEART RATE: 70 BPM | WEIGHT: 235.8 LBS | RESPIRATION RATE: 16 BRPM

## 2019-01-23 DIAGNOSIS — J02.9 VIRAL PHARYNGITIS: ICD-10-CM

## 2019-01-23 LAB
INT CON NEG: NORMAL
INT CON POS: NORMAL
S PYO AG THROAT QL: NEGATIVE

## 2019-01-23 PROCEDURE — 87880 STREP A ASSAY W/OPTIC: CPT | Performed by: NURSE PRACTITIONER

## 2019-01-23 PROCEDURE — 99213 OFFICE O/P EST LOW 20 MIN: CPT | Performed by: NURSE PRACTITIONER

## 2019-01-23 ASSESSMENT — ENCOUNTER SYMPTOMS
MYALGIAS: 0
ABDOMINAL PAIN: 0
SHORTNESS OF BREATH: 0
CHILLS: 0
SWOLLEN GLANDS: 0
RHINORRHEA: 0
VOMITING: 0
NAUSEA: 0
TROUBLE SWALLOWING: 0
HOARSE VOICE: 0
DIZZINESS: 0
SORE THROAT: 1
EYE PAIN: 0
FEVER: 0

## 2019-01-23 NOTE — PROGRESS NOTES
"Subjective:   Tyler Live is a 63 y.o. male who presents for Pharyngitis (x 2-3 days, sore throat, pain to swallow)        Pharyngitis    This is a new problem. Episode onset: 2-3 dayd. The problem has been unchanged. Neither side of throat is experiencing more pain than the other. There has been no fever. The pain is at a severity of 2/10. The pain is mild. Associated symptoms include congestion. Pertinent negatives include no abdominal pain, drooling, ear discharge, ear pain, hoarse voice, plugged ear sensation, shortness of breath, swollen glands, trouble swallowing or vomiting. He has had no exposure to strep. He has tried acetaminophen for the symptoms. The treatment provided no relief.   URI    This is a new problem. The current episode started in the past 7 days. The problem has been unchanged. There has been no fever. Associated symptoms include congestion and a sore throat. Pertinent negatives include no abdominal pain, chest pain, ear pain, joint swelling, nausea, plugged ear sensation, rash, rhinorrhea, swollen glands or vomiting. He has tried nothing for the symptoms. The treatment provided no relief.     Review of Systems   Constitutional: Negative for chills and fever.   HENT: Positive for congestion and sore throat. Negative for drooling, ear discharge, ear pain, hoarse voice, rhinorrhea and trouble swallowing.    Eyes: Negative for pain.   Respiratory: Negative for shortness of breath.    Cardiovascular: Negative for chest pain.   Gastrointestinal: Negative for abdominal pain, nausea and vomiting.   Genitourinary: Negative for hematuria.   Musculoskeletal: Negative for myalgias.   Skin: Negative for rash.   Neurological: Negative for dizziness.     No Known Allergies   Objective:   /86 (BP Location: Left arm, Patient Position: Sitting, BP Cuff Size: Large adult)   Pulse 70   Temp 36.2 °C (97.1 °F) (Temporal)   Resp 16   Ht 1.956 m (6' 5\")   Wt 107 kg (235 lb 12.8 oz)   SpO2 96%  "  BMI 27.96 kg/m²   Physical Exam   Constitutional: He is oriented to person, place, and time. He appears well-developed and well-nourished. No distress.   HENT:   Head: Normocephalic and atraumatic.   Right Ear: Tympanic membrane normal.   Left Ear: Tympanic membrane normal.   Nose: Nose normal. Right sinus exhibits no maxillary sinus tenderness and no frontal sinus tenderness. Left sinus exhibits no maxillary sinus tenderness and no frontal sinus tenderness.   Mouth/Throat: Uvula is midline, oropharynx is clear and moist and mucous membranes are normal. No posterior oropharyngeal edema, posterior oropharyngeal erythema or tonsillar abscesses. No tonsillar exudate.   Eyes: Pupils are equal, round, and reactive to light. Conjunctivae and EOM are normal. Right eye exhibits no discharge. Left eye exhibits no discharge.   Cardiovascular: Normal rate and regular rhythm.    No murmur heard.  Pulmonary/Chest: Effort normal and breath sounds normal. No respiratory distress.   Abdominal: Soft. He exhibits no distension. There is no tenderness.   Lymphadenopathy:     He has no cervical adenopathy.   Neurological: He is alert and oriented to person, place, and time. He has normal reflexes. No sensory deficit.   Skin: Skin is warm, dry and intact.   Psychiatric: He has a normal mood and affect.         Assessment/Plan:     1. Viral pharyngitis  POCT Rapid Strep A     Strep negative    It was explained to the pt. Today that due to the viral nature of the pt's illness, we will treat symptomatically today. Encouraged OTC supportive meds PRN. Humidification, increase fluids  Patient given precautionary s/sx that mandate immediate follow up and evaluation in the ED. Advised of risks of not doing so.    DDX, Supportive care, and indications for immediate follow-up discussed with patient.    Instructed to return to clinic or nearest emergency department if we are not available for any change in condition, further concerns, or  worsening of symptoms.    The patient demonstrated a good understanding and agreed with the treatment plan.

## 2019-01-24 ENCOUNTER — OFFICE VISIT (OUTPATIENT)
Dept: PHYSICAL MEDICINE AND REHAB | Facility: MEDICAL CENTER | Age: 64
End: 2019-01-24
Payer: COMMERCIAL

## 2019-01-24 VITALS
WEIGHT: 234.79 LBS | TEMPERATURE: 97.9 F | SYSTOLIC BLOOD PRESSURE: 128 MMHG | DIASTOLIC BLOOD PRESSURE: 84 MMHG | BODY MASS INDEX: 27.72 KG/M2 | HEIGHT: 77 IN | HEART RATE: 69 BPM | OXYGEN SATURATION: 97 %

## 2019-01-24 DIAGNOSIS — M70.61 GREATER TROCHANTERIC BURSITIS OF RIGHT HIP: ICD-10-CM

## 2019-01-24 PROCEDURE — 20611 DRAIN/INJ JOINT/BURSA W/US: CPT | Performed by: PHYSICAL MEDICINE & REHABILITATION

## 2019-01-24 RX ORDER — DEXAMETHASONE SODIUM PHOSPHATE 4 MG/ML
4 INJECTION, SOLUTION INTRA-ARTICULAR; INTRALESIONAL; INTRAMUSCULAR; INTRAVENOUS; SOFT TISSUE ONCE
Status: COMPLETED | OUTPATIENT
Start: 2019-01-24 | End: 2019-01-24

## 2019-01-24 RX ADMIN — DEXAMETHASONE SODIUM PHOSPHATE 4 MG: 4 INJECTION, SOLUTION INTRA-ARTICULAR; INTRALESIONAL; INTRAMUSCULAR; INTRAVENOUS; SOFT TISSUE at 09:11

## 2019-01-24 ASSESSMENT — PATIENT HEALTH QUESTIONNAIRE - PHQ9: CLINICAL INTERPRETATION OF PHQ2 SCORE: 0

## 2019-01-24 NOTE — PROCEDURES
Date of Service: 1/24/2019    Physician/s: Mert Onofre MD    Pre-operative Diagnosis: RIGHT  greater trochanteric bursitis    Post-operative Diagnosis: RIGHT  greater trochanteric bursitis    Procedure: RIGHT  greater trochanteric bursa injection ultrasound-guided    Description of procedure:    The risks, benefits, and alternatives of the procedure were reviewed and discussed with the patient.  Written informed consent was freely obtained. A pre-procedural time-out was conducted by the physician verifying patient’s identity, procedure to be performed, procedure site and side, and allergy verification. Appropriate equipment was determined to be in place for the procedure.     No sedation was used for this procedure.     In the office suite the patient was placed in a lateral position with his RIGHT  side up, and the skin was prepped and draped in the usual sterile fashion. The ultrasound probe was placed over the RIGHT  greater trochanter and the joint space was located and the target for injection was marked.  A 25g 3.5 inch needle was placed into skin and advanced under ultrasound guidance into the greater trochanteric bursa. Following negative aspiration, approx 4cc of 1% lidocaine with 1cc of  4mg/mL dexamethasone was then injected into the joint, and the needle was subsequently removed intact after restyleted. The patient's hip was wiped with a 4x4 gauze, the area was cleansed with alcohol prep, and a bandaid was applied. There were no complications noted. The images were saved for permanent storage.     Preprocedural pain: 3/10  Postprocedural pain: 0/10    Mert Onofre MD  Physical Medicine and Rehabilitation  Interventional Spine and Sports Physiatry  [facility]

## 2019-02-03 ENCOUNTER — HOSPITAL ENCOUNTER (EMERGENCY)
Facility: MEDICAL CENTER | Age: 64
End: 2019-02-04
Attending: EMERGENCY MEDICINE
Payer: COMMERCIAL

## 2019-02-03 DIAGNOSIS — S05.02XA ABRASION OF LEFT CORNEA, INITIAL ENCOUNTER: ICD-10-CM

## 2019-02-03 PROCEDURE — 700101 HCHG RX REV CODE 250

## 2019-02-03 RX ORDER — PROPARACAINE HYDROCHLORIDE 5 MG/ML
SOLUTION/ DROPS OPHTHALMIC
Status: COMPLETED
Start: 2019-02-03 | End: 2019-02-03

## 2019-02-03 RX ORDER — PROPARACAINE HYDROCHLORIDE 5 MG/ML
1 SOLUTION/ DROPS OPHTHALMIC ONCE
Status: COMPLETED | OUTPATIENT
Start: 2019-02-04 | End: 2019-02-04

## 2019-02-03 RX ORDER — ERYTHROMYCIN 5 MG/G
OINTMENT OPHTHALMIC ONCE
Status: COMPLETED | OUTPATIENT
Start: 2019-02-04 | End: 2019-02-04

## 2019-02-03 RX ADMIN — FLUORESCEIN SODIUM: 0.6 STRIP OPHTHALMIC at 23:45

## 2019-02-03 RX ADMIN — PROPARACAINE HYDROCHLORIDE 1 DROP: 5 SOLUTION/ DROPS OPHTHALMIC at 23:45

## 2019-02-04 VITALS
HEART RATE: 75 BPM | DIASTOLIC BLOOD PRESSURE: 92 MMHG | TEMPERATURE: 97.1 F | SYSTOLIC BLOOD PRESSURE: 148 MMHG | BODY MASS INDEX: 27.57 KG/M2 | OXYGEN SATURATION: 96 % | RESPIRATION RATE: 18 BRPM | WEIGHT: 233.47 LBS | HEIGHT: 77 IN

## 2019-02-04 PROCEDURE — 99283 EMERGENCY DEPT VISIT LOW MDM: CPT

## 2019-02-04 PROCEDURE — 700101 HCHG RX REV CODE 250: Performed by: EMERGENCY MEDICINE

## 2019-02-04 RX ADMIN — PROPARACAINE HYDROCHLORIDE 1 DROP: 5 SOLUTION/ DROPS OPHTHALMIC at 00:15

## 2019-02-04 RX ADMIN — ERYTHROMYCIN: 5 OINTMENT OPHTHALMIC at 00:15

## 2019-02-04 NOTE — ED NOTES
All lines and monitors discontinued. Discharge instructions given, questions answered.    ambulatory out of ER, escorted by self.  Instructed not to drive after taking pain medication and pt verbalizes understanding.

## 2019-02-04 NOTE — ED PROVIDER NOTES
ED Provider Note        CHIEF COMPLAINT  Chief Complaint   Patient presents with   • Eye Injury     left eye, tree branch struck him in the eye, occurred 1300 today       HPI  Tyler Live is a 63 y.o. male who presents with a chief complaint of left eye pain.  It started today at approximately 2-3 PM.  He had no further treatment until he told his wife who looked at his eye noticed it to be red and came to the emergency department.  Pain is in the left thigh.  Its not necessarily worse with movement.  Not better with closing his eyes associate with some intermittent tearing no blurred vision no change in vision.  Patient does have a past visual ophthalmology history of cataract surgery.  Patient was clearing up his yard of branches.  [place] the [place] taking out of his car/truck it hit him in the eye.  He does not wear glasses or contacts.  Hit was not at high speed.    REVIEW OF SYSTEMS  General: No fever or chills.  Eyes: Clear eye discharge.  See above  Ear nose throat: No nasal pain  Dermatologic: No facial lacerations        PAST MEDICAL HISTORY  No past medical history on file.    FAMILY HISTORY  Family History   Problem Relation Age of Onset   • Cancer Father         melanoma   • GI Mother    • Cancer Sister         breast   • Cancer Maternal Grandmother         kidney   • Cancer Maternal Grandfather         prostate   • Cancer Paternal Grandmother         liver   • Cancer Paternal Grandfather         throat   • Diabetes Neg Hx    • Heart Disease Neg Hx    • Stroke Neg Hx        SOCIAL HISTORY  Social History     Social History   • Marital status:      Spouse name: N/A   • Number of children: N/A   • Years of education: N/A     Social History Main Topics   • Smoking status: Never Smoker   • Smokeless tobacco: Never Used   • Alcohol use Yes      Comment: rare   • Drug use: No   • Sexual activity: Yes     Partners: Female      Comment: ,  [facility]  "    Other Topics Concern   •  Service No   • Blood Transfusions No   • Caffeine Concern No   • Occupational Exposure No   • Hobby Hazards No   • Sleep Concern Yes   • Stress Concern No   • Weight Concern No   • Special Diet No   • Back Care No   • Exercise Yes   • Bike Helmet No   • Seat Belt Yes   • Self-Exams Yes     Social History Narrative   • No narrative on file       SURGICAL HISTORY  Past Surgical History:   Procedure Laterality Date   • CATARACT EXTRACTION WITH IOL Bilateral    • COLONOSCOPY  age 50    neg   • EYE SURGERY  age 4    R eye,trauma       CURRENT MEDICATIONS  Home Medications     Reviewed by Michelet Mir R.N. (Registered Nurse) on 02/03/19 at 2224  Med List Status: Complete   Medication Last Dose Status   meclizine (ANTIVERT) 25 MG Tab  Active                ALLERGIES  No Known Allergies    PHYSICAL EXAM  VITAL SIGNS: /100   Pulse 77   Temp 36.2 °C (97.1 °F) (Temporal)   Resp 18   Ht 1.956 m (6' 5\")   Wt 105.9 kg (233 lb 7.5 oz)   SpO2 95%   BMI 27.69 kg/m²  Room air O2: 95    Constitutional: Well developed, Well nourished, No acute distress, Non-toxic appearance.   HENT: Normocephalic, Atraumatic, Bilateral external ears normal, Oropharynx moist, No oral exudates, Nose normal.   Eyes: Pupils are equal round reactive to light.  Anicteric sclera there is no pain on contralateral light examination.  No proptosis.  Injected sclera on the left.  Extraocular muscles are intact.   Neck: Normal range of motion, No tenderness, Supple, No stridor.         RADIOLOGY/PROCEDURES  Slit-lamp examination reveals that anterior chambers clear and quiet.  There appears to be a corneal abrasion at approximately 12:00 at the border of the iris and sclera.  No discharge.  Injected sclera.  Patient with floor seen staining reveals uptake at 12:00 consistent with a corneal abrasion.  No foreign body or material noted.    COURSE & MEDICAL DECISION MAKING  Pertinent Labs & Imaging studies " reviewed. (See chart for details)  Patient is here with most likely corneal abrasion from history and physical findings suggest no intraocular trauma.  Patient looks otherwise well.  He had complete resolution of symptoms from the proparacaine.    At this point will go ahead and put him on erythromycin ointment as I suspected to be a low risk for infection.  This will also give him comfort.  Patient was told to follow-up with ophthalmology if not better in 1-2 days.  Obviously return to the ER if he cannot an hour if he worsens in any way.  He will take Motrin at home for pain.      Yves Godoy M.D.  [address] [place] [id number]  [phone]    Call in 2 days  if not better        FINAL IMPRESSION  1.  Corneal abrasion  2.  Status post slit lamp examination  3.      Electronically signed by: Chris Corrales, 2/3/2019 11:51 PM

## 2019-02-04 NOTE — ED TRIAGE NOTES
Tyler Live  63 y.o.  Chief Complaint   Patient presents with   • Eye Injury     left eye, tree branch struck him in the eye, occurred 1300 today     Patient ambulatory with steady gait to triage room with above complaint.  Patient appears in mild discomfort.  Redness noted to left eye.  Denies visual disturbance.  Reports watering.      Patient escorted to the lobby and instructed to notify staff of any changes in condition.

## 2019-02-05 ENCOUNTER — OFFICE VISIT (OUTPATIENT)
Dept: PHYSICAL MEDICINE AND REHAB | Facility: MEDICAL CENTER | Age: 64
End: 2019-02-05
Payer: COMMERCIAL

## 2019-02-05 VITALS
HEIGHT: 77 IN | OXYGEN SATURATION: 95 % | WEIGHT: 235.01 LBS | SYSTOLIC BLOOD PRESSURE: 112 MMHG | DIASTOLIC BLOOD PRESSURE: 72 MMHG | TEMPERATURE: 97.5 F | HEART RATE: 69 BPM | BODY MASS INDEX: 27.75 KG/M2

## 2019-02-05 DIAGNOSIS — M25.651 DECREASED RANGE OF MOTION OF BOTH HIPS: ICD-10-CM

## 2019-02-05 DIAGNOSIS — M25.652 DECREASED RANGE OF MOTION OF BOTH HIPS: ICD-10-CM

## 2019-02-05 DIAGNOSIS — M54.50 ACUTE BILATERAL LOW BACK PAIN WITHOUT SCIATICA: ICD-10-CM

## 2019-02-05 DIAGNOSIS — M16.0 BILATERAL HIP JOINT ARTHRITIS: ICD-10-CM

## 2019-02-05 DIAGNOSIS — M70.61 GREATER TROCHANTERIC BURSITIS OF RIGHT HIP: ICD-10-CM

## 2019-02-05 DIAGNOSIS — M25.551 CHRONIC PAIN OF RIGHT HIP: ICD-10-CM

## 2019-02-05 DIAGNOSIS — G89.29 CHRONIC PAIN OF RIGHT HIP: ICD-10-CM

## 2019-02-05 PROCEDURE — 99214 OFFICE O/P EST MOD 30 MIN: CPT | Performed by: PHYSICAL MEDICINE & REHABILITATION

## 2019-02-05 ASSESSMENT — PAIN SCALES - GENERAL: PAINLEVEL: NO PAIN

## 2019-02-05 ASSESSMENT — PATIENT HEALTH QUESTIONNAIRE - PHQ9: CLINICAL INTERPRETATION OF PHQ2 SCORE: 0

## 2019-02-05 NOTE — Clinical Note
Dear Abdirahman Farias, TAQUERIA. ,   Thank you for the referral of Tyler Live.  The patient had near complete resolution of his lateral hip pain after an ultrasound-guided right greater trochanteric hip injection which I performed previously.  Please see my note for more details    Should you have any questions or concerns please do not hesitate to contact me.  Mert Onofre M.D.

## 2019-02-06 NOTE — PROGRESS NOTES
Follow up patient note  Interventional spine and sports physiatry, Physical medicine rehabilitation      Chief complaint:   Chief Complaint   Patient presents with   • Follow-Up     hip pain          HISTORY    Please see new patient note dated  by Dr Onofre,  for more details.     HPI  Patient identification: Tyler Live 63 y.o. male  With Diagnoses of Greater trochanteric bursitis of right hip, Bilateral hip joint arthritis, Chronic pain of right hip, Decreased range of motion of both hips, and Acute bilateral low back pain without sciatica were pertinent to this visit.       -Patient had a drastic improvement with near complete resolution of his right hip pain focused on the lateral aspect of the right hip near the trochanter.  I performed an ultrasound-guided greater trochanteric bursa injection at the previous visit.  The patient notes 80% improvement.  Now his pain is 1/10 in intensity, aching in quality, lateral hip pain, worse with exercise.  Nonradiating pain.  Denies groin pain.  He has had intermittent back pain when he returned to his exercise program.  This is axial back pain and intermittent.  Not causing functional deficits at this time.       ROS Red Flags :   -  Fever, Chills, Sweats: Denies  Involuntary Weight Loss: Denies  Bowel/Bladder Incontinence: Denies  Saddle Anesthesia: Denies        PMHx:   History reviewed. No pertinent past medical history.    PSHx:   Past Surgical History:   Procedure Laterality Date   • CATARACT EXTRACTION WITH IOL Bilateral    • COLONOSCOPY  age 50    neg   • EYE SURGERY  age 4    R eye,trauma       Family history   Family History   Problem Relation Age of Onset   • Cancer Father         melanoma   • GI Mother    • Cancer Sister         breast   • Cancer Maternal Grandmother         kidney   • Cancer Maternal Grandfather         prostate   • Cancer Paternal Grandmother         liver   • Cancer Paternal Grandfather         throat   • Diabetes Neg Hx    •  "Heart Disease Neg Hx    • Stroke Neg Hx          Medications:   No outpatient prescriptions have been marked as taking for the 2/5/19 encounter (Office Visit) with Mert Onofre M.D..        Current Outpatient Prescriptions on File Prior to Visit   Medication Sig Dispense Refill   • meclizine (ANTIVERT) 25 MG Tab Take 1 Tab by mouth 3 times a day as needed. 30 Tab 0     No current facility-administered medications on file prior to visit.          Allergies:   No Known Allergies    Social Hx:   Social History     Social History   • Marital status:      Spouse name: N/A   • Number of children: N/A   • Years of education: N/A     Occupational History   • Not on file.     Social History Main Topics   • Smoking status: Never Smoker   • Smokeless tobacco: Never Used   • Alcohol use Yes      Comment: rare   • Drug use: No   • Sexual activity: Yes     Partners: Female      Comment: ,  [facility]     Other Topics Concern   •  Service No   • Blood Transfusions No   • Caffeine Concern No   • Occupational Exposure No   • Hobby Hazards No   • Sleep Concern Yes   • Stress Concern No   • Weight Concern No   • Special Diet No   • Back Care No   • Exercise Yes   • Bike Helmet No   • Seat Belt Yes   • Self-Exams Yes     Social History Narrative   • No narrative on file         EXAMINATION     Physical Exam:   Vitals: Blood pressure 112/72, pulse 69, temperature 36.4 °C (97.5 °F), temperature source Temporal, height 1.956 m (6' 5\"), weight 106.6 kg (235 lb 0.2 oz), SpO2 95 %.    Constitutional:   Body Habitus: Body mass index is 27.87 kg/m².  Cooperation: Fully cooperates with exam  Appearance: Well-groomed no disheveled    Respiratory-  breathing comfortable on room air, no audible wheezing  Cardiovascular- capillary refills less than 2 seconds. No lower extremity edema is noted.   Psychiatric- alert and oriented ×3. Normal affect.    MSK: -No tenderness palpation of the greater trochanter.  Resisted " hip abduction does not reproduce pain at this time bilaterally.  No signs of infection over the injection sites.  Range of motion improved in the bilateral hips.          MEDICAL DECISION MAKING    DATA    Labs:   Lab Results   Component Value Date/Time    SODIUM 143 08/03/2017 08:07 AM    SODIUM 142 09/03/2008 03:38 PM    POTASSIUM 4.4 08/03/2017 08:07 AM    POTASSIUM 3.9 09/03/2008 03:38 PM    CHLORIDE 105 08/03/2017 08:07 AM    CHLORIDE 110 09/03/2008 03:38 PM    CO2 23 08/03/2017 08:07 AM    CO2 28 09/03/2008 03:38 PM    GLUCOSE 99 08/03/2017 08:07 AM    GLUCOSE 90 09/03/2008 03:38 PM    BUN 15 08/03/2017 08:07 AM    BUN 15 09/03/2008 03:38 PM    CREATININE 1.05 08/03/2017 08:07 AM    CREATININE 1.2 09/03/2008 03:38 PM    BUNCREATRAT 14 08/03/2017 08:07 AM        Lab Results   Component Value Date/Time    PROTHROMBTM 12.0 09/03/2008 03:38 PM    INR 1.03 09/03/2008 03:38 PM        Lab Results   Component Value Date/Time    WBC 7.2 09/03/2008 03:38 PM    RBC 4.39 (L) 09/03/2008 03:38 PM    HEMOGLOBIN 14.5 09/03/2008 03:38 PM    HEMATOCRIT 41.3 (L) 09/03/2008 03:38 PM    MCV 94.1 09/03/2008 03:38 PM    MCH 33.0 09/03/2008 03:38 PM    MCHC 35.1 (H) 09/03/2008 03:38 PM    MPV 8.1 09/03/2008 03:38 PM        No results found for: HBA1C                     DIAGNOSIS   Visit Diagnoses     ICD-10-CM   1. Greater trochanteric bursitis of right hip M70.61   2. Bilateral hip joint arthritis M16.0   3. Chronic pain of right hip M25.551    G89.29   4. Decreased range of motion of both hips M25.651    M25.652   5. Acute bilateral low back pain without sciatica M54.5         ASSESSMENT and PLAN:     Tyler Live 63 y.o. male      Tyler was seen today for follow-up.    Diagnoses and all orders for this visit:    Greater trochanteric bursitis of right hip  Comments:  Significantly improved after ultrasound-guided injection.  I taught the patient hip abduction strengthening and stretching exercises for hamstrings and  quads  Orders:  -     REFERRAL TO PHYSICAL THERAPY Reason for Therapy: Eval/Treat/Report    Bilateral hip joint arthritis  Comments:  Stable  Orders:  -     REFERRAL TO PHYSICAL THERAPY Reason for Therapy: Eval/Treat/Report    Chronic pain of right hip  Comments:  Stable  Orders:  -     REFERRAL TO PHYSICAL THERAPY Reason for Therapy: Eval/Treat/Report    Decreased range of motion of both hips  -     REFERRAL TO PHYSICAL THERAPY Reason for Therapy: Eval/Treat/Report    Acute bilateral low back pain without sciatica  Comments:  Stable, monitor for now.  I discussed proper form for hip strengthening exercises  Orders:  -     REFERRAL TO PHYSICAL THERAPY Reason for Therapy: Eval/Treat/Report        -    Follow up: after PT    Thank you for allowing me to participate in the care of this patient. If you have any questions please not hesitate to contact me.          Please note that this dictation was created using voice recognition software. I have made every reasonable attempt to correct obvious errors but there may be errors of grammar and content that I may have overlooked prior to finalization of this note.      Mert Onofre MD  Interventional Spine and Sports Physiatry  Physical Medicine and Rehabilitation  [facility]  2/5/2019  4:15 PM

## 2019-03-25 ENCOUNTER — APPOINTMENT (RX ONLY)
Dept: URBAN - METROPOLITAN AREA CLINIC 20 | Facility: CLINIC | Age: 64
Setting detail: DERMATOLOGY
End: 2019-03-25

## 2019-03-25 DIAGNOSIS — D22 MELANOCYTIC NEVI: ICD-10-CM

## 2019-03-25 DIAGNOSIS — L20.89 OTHER ATOPIC DERMATITIS: ICD-10-CM

## 2019-03-25 DIAGNOSIS — L85.3 XEROSIS CUTIS: ICD-10-CM

## 2019-03-25 DIAGNOSIS — L81.4 OTHER MELANIN HYPERPIGMENTATION: ICD-10-CM

## 2019-03-25 DIAGNOSIS — L82.0 INFLAMED SEBORRHEIC KERATOSIS: ICD-10-CM

## 2019-03-25 DIAGNOSIS — L82.1 OTHER SEBORRHEIC KERATOSIS: ICD-10-CM

## 2019-03-25 DIAGNOSIS — L21.8 OTHER SEBORRHEIC DERMATITIS: ICD-10-CM

## 2019-03-25 DIAGNOSIS — Z71.89 OTHER SPECIFIED COUNSELING: ICD-10-CM

## 2019-03-25 DIAGNOSIS — D18.0 HEMANGIOMA: ICD-10-CM

## 2019-03-25 PROBLEM — D48.5 NEOPLASM OF UNCERTAIN BEHAVIOR OF SKIN: Status: ACTIVE | Noted: 2019-03-25

## 2019-03-25 PROBLEM — D18.01 HEMANGIOMA OF SKIN AND SUBCUTANEOUS TISSUE: Status: ACTIVE | Noted: 2019-03-25

## 2019-03-25 PROBLEM — L20.84 INTRINSIC (ALLERGIC) ECZEMA: Status: ACTIVE | Noted: 2019-03-25

## 2019-03-25 PROBLEM — D22.61 MELANOCYTIC NEVI OF RIGHT UPPER LIMB, INCLUDING SHOULDER: Status: ACTIVE | Noted: 2019-03-25

## 2019-03-25 PROBLEM — D22.5 MELANOCYTIC NEVI OF TRUNK: Status: ACTIVE | Noted: 2019-03-25

## 2019-03-25 PROBLEM — D22.62 MELANOCYTIC NEVI OF LEFT UPPER LIMB, INCLUDING SHOULDER: Status: ACTIVE | Noted: 2019-03-25

## 2019-03-25 PROCEDURE — ? LIQUID NITROGEN

## 2019-03-25 PROCEDURE — 99214 OFFICE O/P EST MOD 30 MIN: CPT | Mod: 25

## 2019-03-25 PROCEDURE — ? BIOPSY BY SHAVE METHOD

## 2019-03-25 PROCEDURE — ? ADDITIONAL NOTES

## 2019-03-25 PROCEDURE — ? MEDICATION COUNSELING

## 2019-03-25 PROCEDURE — ? COUNSELING

## 2019-03-25 PROCEDURE — ? PRESCRIPTION

## 2019-03-25 PROCEDURE — 17110 DESTRUCTION B9 LES UP TO 14: CPT

## 2019-03-25 PROCEDURE — ? COUNSELING: TOPICAL STEROIDS

## 2019-03-25 PROCEDURE — 11102 TANGNTL BX SKIN SINGLE LES: CPT | Mod: 59

## 2019-03-25 PROCEDURE — ? SUNSCREEN RECOMMENDATIONS

## 2019-03-25 RX ORDER — CLOBETASOL PROPIONATE 0.5 MG/G
CREAM TOPICAL
Qty: 1 | Refills: 0 | Status: ERX

## 2019-03-25 ASSESSMENT — LOCATION SIMPLE DESCRIPTION DERM
LOCATION SIMPLE: LEFT LOWER BACK
LOCATION SIMPLE: UPPER BACK
LOCATION SIMPLE: RIGHT LOWER BACK
LOCATION SIMPLE: RIGHT FOREARM
LOCATION SIMPLE: LEFT HAND
LOCATION SIMPLE: RIGHT FOREHEAD
LOCATION SIMPLE: LEFT UPPER BACK
LOCATION SIMPLE: LEFT FOREARM
LOCATION SIMPLE: RIGHT ELBOW
LOCATION SIMPLE: RIGHT HAND
LOCATION SIMPLE: RIGHT UPPER BACK
LOCATION SIMPLE: ABDOMEN
LOCATION SIMPLE: LEFT ELBOW

## 2019-03-25 ASSESSMENT — LOCATION DETAILED DESCRIPTION DERM
LOCATION DETAILED: LEFT SUPERIOR UPPER BACK
LOCATION DETAILED: RIGHT RADIAL DORSAL HAND
LOCATION DETAILED: INFERIOR THORACIC SPINE
LOCATION DETAILED: EPIGASTRIC SKIN
LOCATION DETAILED: SUPERIOR THORACIC SPINE
LOCATION DETAILED: LEFT SUPERIOR MEDIAL UPPER BACK
LOCATION DETAILED: LEFT INFERIOR LATERAL UPPER BACK
LOCATION DETAILED: RIGHT SUPERIOR UPPER BACK
LOCATION DETAILED: LEFT RADIAL DORSAL HAND
LOCATION DETAILED: LEFT ELBOW
LOCATION DETAILED: RIGHT PROXIMAL DORSAL FOREARM
LOCATION DETAILED: LEFT MID-UPPER BACK
LOCATION DETAILED: LEFT VENTRAL PROXIMAL FOREARM
LOCATION DETAILED: RIGHT SUPERIOR MEDIAL MIDBACK
LOCATION DETAILED: RIGHT INFERIOR MEDIAL FOREHEAD
LOCATION DETAILED: RIGHT INFERIOR UPPER BACK
LOCATION DETAILED: RIGHT SUPERIOR MEDIAL UPPER BACK
LOCATION DETAILED: LEFT SUPERIOR MEDIAL MIDBACK
LOCATION DETAILED: RIGHT VENTRAL DISTAL FOREARM
LOCATION DETAILED: LEFT PROXIMAL DORSAL FOREARM
LOCATION DETAILED: RIGHT ELBOW
LOCATION DETAILED: RIGHT MEDIAL UPPER BACK

## 2019-03-25 ASSESSMENT — LOCATION ZONE DERM
LOCATION ZONE: ARM
LOCATION ZONE: TRUNK
LOCATION ZONE: FACE
LOCATION ZONE: HAND

## 2019-03-25 NOTE — PROCEDURE: ADDITIONAL NOTES
Detail Level: Zone
Additional Notes: Patient still has ketoconazole for treatment, advised him to continue treatment.

## 2019-03-25 NOTE — PROCEDURE: BIOPSY BY SHAVE METHOD
Notification Instructions: Patient will be notified of biopsy results. However, patient instructed to call the office if not contacted within 2 weeks.
Detail Level: Detailed
Biopsy Method: Personna blade
Hemostasis: Drysol and Electrocautery
Lab: [id number]
Bill For Surgical Tray: no
Lab Facility: 
Additional Anesthesia Volume In Cc (Will Not Render If 0): 0
Anesthesia Type: 1% lidocaine with 1:100,000 epinephrine and a 1:12 solution of 8.4% sodium bicarbonate
Depth Of Biopsy: dermis
Wound Care: Bacitracin
Consent: Written consent was obtained and risks were reviewed including but not limited to scarring, infection, bleeding, scabbing, incomplete removal, nerve damage and allergy to anesthesia.
Type Of Destruction Used: Curettage
Anesthesia Volume In Cc: 1
Render Post-Care Instructions In Note?: yes
Biopsy Type: H and E
Post-Care Instructions: I reviewed with the patient in detail post-care instructions. Patient is to keep the biopsy site clean once daily and then apply petroleum and bandaid  until healed.
Billing Type: Third-Party Bill
Dressing: Band-Aid

## 2019-03-25 NOTE — PROCEDURE: LIQUID NITROGEN
Post-Care Instructions: I reviewed with the patient in detail post-care instructions. Patient is to wear sunprotection, and avoid picking at any of the treated lesions. Pt may apply Vaseline to crusted or scabbing areas.
Medical Necessity Clause: This procedure was medically necessary because the lesions that were treated were:
Include Z78.9 (Other Specified Conditions Influencing Health Status) As An Associated Diagnosis?: No
Number Of Freeze-Thaw Cycles: 2 freeze-thaw cycles
Medical Necessity Information: It is in your best interest to select a reason for this procedure from the list below. All of these items fulfill various CMS LCD requirements except the new and changing color options.
Consent: The patient's consent was obtained including but not limited to risks of crusting, scabbing, blistering, scarring, darker or lighter pigmentary change, recurrence, incomplete removal and infection.
Detail Level: Detailed

## 2019-03-25 NOTE — PROCEDURE: MEDICATION COUNSELING
Picato Pregnancy And Lactation Text: This medication is Pregnancy Category C. It is unknown if this medication is excreted in breast milk.
Albendazole Counseling:  I discussed with the patient the risks of albendazole including but not limited to cytopenia, kidney damage, nausea/vomiting and severe allergy.  The patient understands that this medication is being used in an off-label manner.
Arava Pregnancy And Lactation Text: This medication is Pregnancy Category X and is absolutely contraindicated during pregnancy. It is unknown if it is excreted in breast milk.
Terbinafine Counseling: Patient counseling regarding adverse effects of terbinafine including but not limited to headache, diarrhea, rash, upset stomach, liver function test abnormalities, itching, taste/smell disturbance, nausea, abdominal pain, and flatulence.  There is a rare possibility of liver failure that can occur when taking terbinafine.  The patient understands that a baseline LFT and kidney function test may be required. The patient verbalized understanding of the proper use and possible adverse effects of terbinafine.  All of the patient's questions and concerns were addressed.
Cyclophosphamide Counseling:  I discussed with the patient the risks of cyclophosphamide including but not limited to hair loss, hormonal abnormalities, decreased fertility, abdominal pain, diarrhea, nausea and vomiting, bone marrow suppression and infection. The patient understands that monitoring is required while taking this medication.
Bactrim Pregnancy And Lactation Text: This medication is Pregnancy Category D and is known to cause fetal risk.  It is also excreted in breast milk.
Metronidazole Counseling:  I discussed with the patient the risks of metronidazole including but not limited to seizures, nausea/vomiting, a metallic taste in the mouth, nausea/vomiting and severe allergy.
Benzoyl Peroxide Counseling: Patient counseled that medicine may cause skin irritation and bleach clothing.  In the event of skin irritation, the patient was advised to reduce the amount of the drug applied or use it less frequently.   The patient verbalized understanding of the proper use and possible adverse effects of benzoyl peroxide.  All of the patient's questions and concerns were addressed.
Eucrisa Counseling: Patient may experience a mild burning sensation during topical application. Eucrisa is not approved in children less than 2 years of age.
Terbinafine Pregnancy And Lactation Text: This medication is Pregnancy Category B and is considered safe during pregnancy. It is also excreted in breast milk and breast feeding isn't recommended.
Cyclophosphamide Pregnancy And Lactation Text: This medication is Pregnancy Category D and it isn't considered safe during pregnancy. This medication is excreted in breast milk.
SSKI Counseling:  I discussed with the patient the risks of SSKI including but not limited to thyroid abnormalities, metallic taste, GI upset, fever, headache, acne, arthralgias, paraesthesias, lymphadenopathy, easy bleeding, arrhythmias, and allergic reaction.
Metronidazole Pregnancy And Lactation Text: This medication is Pregnancy Category B and considered safe during pregnancy.  It is also excreted in breast milk.
Benzoyl Peroxide Pregnancy And Lactation Text: This medication is Pregnancy Category C. It is unknown if benzoyl peroxide is excreted in breast milk.
Xeljanz Counseling: I discussed with the patient the risks of Xeljanz therapy including increased risk of infection, liver issues, headache, diarrhea, or cold symptoms. Live vaccines should be avoided. They were instructed to call if they have any problems.
Odomzo Counseling- I discussed with the patient the risks of Odomzo including but not limited to nausea, vomiting, diarrhea, constipation, weight loss, changes in the sense of taste, decreased appetite, muscle spasms, and hair loss.  The patient verbalized understanding of the proper use and possible adverse effects of Odomzo.  All of the patient's questions and concerns were addressed.
Detail Level: Zone
Topical Clindamycin Pregnancy And Lactation Text: This medication is Pregnancy Category B and is considered safe during pregnancy. It is unknown if it is excreted in breast milk.
Infliximab Counseling:  I discussed with the patient the risks of infliximab including but not limited to myelosuppression, immunosuppression, autoimmune hepatitis, demyelinating diseases, lymphoma, and serious infections.  The patient understands that monitoring is required including a PPD at baseline and must alert us or the primary physician if symptoms of infection or other concerning signs are noted.
Acitretin Pregnancy And Lactation Text: This medication is Pregnancy Category X and should not be given to women who are pregnant or may become pregnant in the future. This medication is excreted in breast milk.
Siliq Pregnancy And Lactation Text: The risk during pregnancy and breastfeeding is uncertain with this medication.
Gabapentin Pregnancy And Lactation Text: This medication is Pregnancy Category C and isn't considered safe during pregnancy. It is excreted in breast milk.
Topical Sulfur Applications Counseling: Topical Sulfur Counseling: Patient counseled that this medication may cause skin irritation or allergic reactions.  In the event of skin irritation, the patient was advised to reduce the amount of the drug applied or use it less frequently.   The patient verbalized understanding of the proper use and possible adverse effects of topical sulfur application.  All of the patient's questions and concerns were addressed.
Xeltamikaz Pregnancy And Lactation Text: This medication is Pregnancy Category D and is not considered safe during pregnancy.  The risk during breast feeding is also uncertain.
Glycopyrrolate Counseling:  I discussed with the patient the risks of glycopyrrolate including but not limited to skin rash, drowsiness, dry mouth, difficulty urinating, and blurred vision.
Albendazole Pregnancy And Lactation Text: This medication is Pregnancy Category C and it isn't known if it is safe during pregnancy. It is also excreted in breast milk.
Fluconazole Counseling:  Patient counseled regarding adverse effects of fluconazole including but not limited to headache, diarrhea, nausea, upset stomach, liver function test abnormalities, taste disturbance, and stomach pain.  There is a rare possibility of liver failure that can occur when taking fluconazole.  The patient understands that monitoring of LFTs and kidney function test may be required, especially at baseline. The patient verbalized understanding of the proper use and possible adverse effects of fluconazole.  All of the patient's questions and concerns were addressed.
Protopic Counseling: Patient may experience a mild burning sensation during topical application. Protopic is not approved in children less than 2 years of age. There have been case reports of hematologic and skin malignancies in patients using topical calcineurin inhibitors although causality is questionable.
Clofazimine Counseling:  I discussed with the patient the risks of clofazimine including but not limited to skin and eye pigmentation, liver damage, nausea/vomiting, gastrointestinal bleeding and allergy.
Cephalexin Counseling: I counseled the patient regarding use of cephalexin as an antibiotic for prophylactic and/or therapeutic purposes. Cephalexin (commonly prescribed under brand name Keflex) is a cephalosporin antibiotic which is active against numerous classes of bacteria, including most skin bacteria. Side effects may include nausea, diarrhea, gastrointestinal upset, rash, hives, yeast infections, and in rare cases, hepatitis, kidney disease, seizures, fever, confusion, neurologic symptoms, and others. Patients with severe allergies to penicillin medications are cautioned that there is about a 10% incidence of cross-reactivity with cephalosporins. When possible, patients with penicillin allergies should use alternatives to cephalosporins for antibiotic therapy.
Cosentyx Pregnancy And Lactation Text: This medication is Pregnancy Category B and is considered safe during pregnancy. It is unknown if this medication is excreted in breast milk.
Dupixent Counseling: I discussed with the patient the risks of dupilumab including but not limited to eye infection and irritation, cold sores, injection site reactions, worsening of asthma, allergic reactions and increased risk of parasitic infection.  Live vaccines should be avoided while taking dupilumab. Dupilumab will also interact with certain medications such as warfarin and cyclosporine. The patient understands that monitoring is required and they must alert us or the primary physician if symptoms of infection or other concerning signs are noted.
Carac Counseling:  I discussed with the patient the risks of Carac including but not limited to erythema, scaling, itching, weeping, crusting, and pain.
Fluconazole Pregnancy And Lactation Text: This medication is Pregnancy Category C and it isn't know if it is safe during pregnancy. It is also excreted in breast milk.
Protopic Pregnancy And Lactation Text: This medication is Pregnancy Category C. It is unknown if this medication is excreted in breast milk when applied topically.
Cephalexin Pregnancy And Lactation Text: This medication is Pregnancy Category B and considered safe during pregnancy.  It is also excreted in breast milk but can be used safely for shorter doses.
Simponi Counseling:  I discussed with the patient the risks of golimumab including but not limited to myelosuppression, immunosuppression, autoimmune hepatitis, demyelinating diseases, lymphoma, and serious infections.  The patient understands that monitoring is required including a PPD at baseline and must alert us or the primary physician if symptoms of infection or other concerning signs are noted.
Bexarotene Counseling:  I discussed with the patient the risks of bexarotene including but not limited to hair loss, dry lips/skin/eyes, liver abnormalities, hyperlipidemia, pancreatitis, depression/suicidal ideation, photosensitivity, drug rash/allergic reactions, hypothyroidism, anemia, leukopenia, infection, cataracts, and teratogenicity.  Patient understands that they will need regular blood tests to check lipid profile, liver function tests, white blood cell count, thyroid function tests and pregnancy test if applicable.
Sski Pregnancy And Lactation Text: This medication is Pregnancy Category D and isn't considered safe during pregnancy. It is excreted in breast milk.
Minocycline Counseling: Patient advised regarding possible photosensitivity and discoloration of the teeth, skin, lips, tongue and gums.  Patient instructed to avoid sunlight, if possible.  When exposed to sunlight, patients should wear protective clothing, sunglasses, and sunscreen.  The patient was instructed to call the office immediately if the following severe adverse effects occur:  hearing changes, easy bruising/bleeding, severe headache, or vision changes.  The patient verbalized understanding of the proper use and possible adverse effects of minocycline.  All of the patient's questions and concerns were addressed.
Cyclosporine Counseling:  I discussed with the patient the risks of cyclosporine including but not limited to hypertension, gingival hyperplasia,myelosuppression, immunosuppression, liver damage, kidney damage, neurotoxicity, lymphoma, and serious infections. The patient understands that monitoring is required including baseline blood pressure, CBC, CMP, lipid panel and uric acid, and then 1-2 times monthly CMP and blood pressure.
Eucrisa Pregnancy And Lactation Text: This medication has not been assigned a Pregnancy Risk Category but animal studies failed to show danger with the topical medication. It is unknown if the medication is excreted in breast milk.
Include Pregnancy/Lactation Warning?: No
Glycopyrrolate Pregnancy And Lactation Text: This medication is Pregnancy Category B and is considered safe during pregnancy. It is unknown if it is excreted breast milk.
Bexarotene Pregnancy And Lactation Text: This medication is Pregnancy Category X and should not be given to women who are pregnant or may become pregnant. This medication should not be used if you are breast feeding.
Cyclosporine Pregnancy And Lactation Text: This medication is Pregnancy Category C and it isn't know if it is safe during pregnancy. This medication is excreted in breast milk.
Hydroquinone Counseling:  Patient advised that medication may result in skin irritation, lightening (hypopigmentation), dryness, and burning.  In the event of skin irritation, the patient was advised to reduce the amount of the drug applied or use it less frequently.  Rarely, spots that are treated with hydroquinone can become darker (pseudoochronosis).  Should this occur, patient instructed to stop medication and call the office. The patient verbalized understanding of the proper use and possible adverse effects of hydroquinone.  All of the patient's questions and concerns were addressed.
Topical Sulfur Applications Pregnancy And Lactation Text: This medication is Pregnancy Category C and has an unknown safety profile during pregnancy. It is unknown if this topical medication is excreted in breast milk.
Ivermectin Counseling:  Patient instructed to take medication on an empty stomach with a full glass of water.  Patient informed of potential adverse effects including but not limited to nausea, diarrhea, dizziness, itching, and swelling of the extremities or lymph nodes.  The patient verbalized understanding of the proper use and possible adverse effects of ivermectin.  All of the patient's questions and concerns were addressed.
Xolair Counseling:  Patient informed of potential adverse effects including but not limited to fever, muscle aches, rash and allergic reactions.  The patient verbalized understanding of the proper use and possible adverse effects of Xolair.  All of the patient's questions and concerns were addressed.
Otezla Counseling: The side effects of Otezla were discussed with the patient, including but not limited to worsening or new depression, weight loss, diarrhea, nausea, upper respiratory tract infection, and headache. Patient instructed to call the office should any adverse effect occur.  The patient verbalized understanding of the proper use and possible adverse effects of Otezla.  All the patient's questions and concerns were addressed.
Thalidomide Counseling: I discussed with the patient the risks of thalidomide including but not limited to birth defects, anxiety, weakness, chest pain, dizziness, cough and severe allergy.
Colchicine Counseling:  Patient counseled regarding adverse effects including but not limited to stomach upset (nausea, vomiting, stomach pain, or diarrhea).  Patient instructed to limit alcohol consumption while taking this medication.  Colchicine may reduce blood counts especially with prolonged use.  The patient understands that monitoring of kidney function and blood counts may be required, especially at baseline. The patient verbalized understanding of the proper use and possible adverse effects of colchicine.  All of the patient's questions and concerns were addressed.
Griseofulvin Counseling:  I discussed with the patient the risks of griseofulvin including but not limited to photosensitivity, cytopenia, liver damage, nausea/vomiting and severe allergy.  The patient understands that this medication is best absorbed when taken with a fatty meal (e.g., ice cream or french fries).
Otezla Pregnancy And Lactation Text: This medication is Pregnancy Category C and it isn't known if it is safe during pregnancy. It is unknown if it is excreted in breast milk.
Dupixent Pregnancy And Lactation Text: This medication likely crosses the placenta but the risk for the fetus is uncertain. This medication is excreted in breast milk.
Clindamycin Counseling: I counseled the patient regarding use of clindamycin as an antibiotic for prophylactic and/or therapeutic purposes. Clindamycin is active against numerous classes of bacteria, including skin bacteria. Side effects may include nausea, diarrhea, gastrointestinal upset, rash, hives, yeast infections, and in rare cases, colitis.
Cimetidine Counseling:  I discussed with the patient the risks of Cimetidine including but not limited to gynecomastia, headache, diarrhea, nausea, drowsiness, arrhythmias, pancreatitis, skin rashes, psychosis, bone marrow suppression and kidney toxicity.
Carac Pregnancy And Lactation Text: This medication is Pregnancy Category X and contraindicated in pregnancy and in women who may become pregnant. It is unknown if this medication is excreted in breast milk.
Minocycline Pregnancy And Lactation Text: This medication is Pregnancy Category D and not consider safe during pregnancy. It is also excreted in breast milk.
Solaraze Counseling:  I discussed with the patient the risks of Solaraze including but not limited to erythema, scaling, itching, weeping, crusting, and pain.
Methotrexate Counseling:  Patient counseled regarding adverse effects of methotrexate including but not limited to nausea, vomiting, abnormalities in liver function tests. Patients may develop mouth sores, rash, diarrhea, and abnormalities in blood counts. The patient understands that monitoring is required including LFT's and blood counts.  There is a rare possibility of scarring of the liver and lung problems that can occur when taking methotrexate. Persistent nausea, loss of appetite, pale stools, dark urine, cough, and shortness of breath should be reported immediately. Patient advised to discontinue methotrexate treatment at least three months before attempting to become pregnant.  I discussed the need for folate supplements while taking methotrexate.  These supplements can decrease side effects during methotrexate treatment. The patient verbalized understanding of the proper use and possible adverse effects of methotrexate.  All of the patient's questions and concerns were addressed.
Quinolones Counseling:  I discussed with the patient the risks of fluoroquinolones including but not limited to GI upset, allergic reaction, drug rash, diarrhea, dizziness, photosensitivity, yeast infections, liver function test abnormalities, tendonitis/tendon rupture.
5-Fu Counseling: 5-Fluorouracil Counseling:  I discussed with the patient the risks of 5-fluorouracil including but not limited to erythema, scaling, itching, weeping, crusting, and pain.
Wartpeel Counseling:  I discussed with the patient the risks of Wartpeel including but not limited to erythema, scaling, itching, weeping, crusting, and pain.
Xolair Pregnancy And Lactation Text: This medication is Pregnancy Category B and is considered safe during pregnancy. This medication is excreted in breast milk.
Isotretinoin Counseling: Patient should get monthly blood tests, not donate blood, not drive at night if vision affected, not share medication, and not undergo elective surgery for 6 months after tx completed. Side effects reviewed, pt to contact office should one occur.
Hydroxychloroquine Counseling:  I discussed with the patient that a baseline ophthalmologic exam is needed at the start of therapy and every year thereafter while on therapy. A CBC may also be warranted for monitoring.  The side effects of this medication were discussed with the patient, including but not limited to agranulocytosis, aplastic anemia, seizures, rashes, retinopathy, and liver toxicity. Patient instructed to call the office should any adverse effect occur.  The patient verbalized understanding of the proper use and possible adverse effects of Plaquenil.  All the patient's questions and concerns were addressed.
Oxybutynin Counseling:  I discussed with the patient the risks of oxybutynin including but not limited to skin rash, drowsiness, dry mouth, difficulty urinating, and blurred vision.
Isotretinoin Pregnancy And Lactation Text: This medication is Pregnancy Category X and is considered extremely dangerous during pregnancy. It is unknown if it is excreted in breast milk.
Griseofulvin Pregnancy And Lactation Text: This medication is Pregnancy Category X and is known to cause serious birth defects. It is unknown if this medication is excreted in breast milk but breast feeding should be avoided.
Stelara Counseling:  I discussed with the patient the risks of ustekinumab including but not limited to immunosuppression, malignancy, posterior leukoencephalopathy syndrome, and serious infections.  The patient understands that monitoring is required including a PPD at baseline and must alert us or the primary physician if symptoms of infection or other concerning signs are noted.
Solaraze Pregnancy And Lactation Text: This medication is Pregnancy Category B and is considered safe. There is some data to suggest avoiding during the third trimester. It is unknown if this medication is excreted in breast milk.
Clindamycin Pregnancy And Lactation Text: This medication can be used in pregnancy if certain situations. Clindamycin is also present in breast milk.
Enbrel Counseling:  I discussed with the patient the risks of etanercept including but not limited to myelosuppression, immunosuppression, autoimmune hepatitis, demyelinating diseases, lymphoma, and infections.  The patient understands that monitoring is required including a PPD at baseline and must alert us or the primary physician if symptoms of infection or other concerning signs are noted.
Hydroxychloroquine Pregnancy And Lactation Text: This medication has been shown to cause fetal harm but it isn't assigned a Pregnancy Risk Category. There are small amounts excreted in breast milk.
Itraconazole Counseling:  I discussed with the patient the risks of itraconazole including but not limited to liver damage, nausea/vomiting, neuropathy, and severe allergy.  The patient understands that this medication is best absorbed when taken with acidic beverages such as non-diet cola or ginger ale.  The patient understands that monitoring is required including baseline LFTs and repeat LFTs at intervals.  The patient understands that they are to contact us or the primary physician if concerning signs are noted.
Doxepin Counseling:  Patient advised that the medication is sedating and not to drive a car after taking this medication. Patient informed of potential adverse effects including but not limited to dry mouth, urinary retention, and blurry vision.  The patient verbalized understanding of the proper use and possible adverse effects of doxepin.  All of the patient's questions and concerns were addressed.
Valtrex Counseling: I discussed with the patient the risks of valacyclovir including but not limited to kidney damage, nausea, vomiting and severe allergy.  The patient understands that if the infection seems to be worsening or is not improving, they are to call.
Topical Retinoid counseling:  Patient advised to apply a pea-sized amount only at bedtime and wait 30 minutes after washing their face before applying.  If too drying, patient may add a non-comedogenic moisturizer. The patient verbalized understanding of the proper use and possible adverse effects of retinoids.  All of the patient's questions and concerns were addressed.
Doxycycline Counseling:  Patient counseled regarding possible photosensitivity and increased risk for sunburn.  Patient instructed to avoid sunlight, if possible.  When exposed to sunlight, patients should wear protective clothing, sunglasses, and sunscreen.  The patient was instructed to call the office immediately if the following severe adverse effects occur:  hearing changes, easy bruising/bleeding, severe headache, or vision changes.  The patient verbalized understanding of the proper use and possible adverse effects of doxycycline.  All of the patient's questions and concerns were addressed.
Imiquimod Counseling:  I discussed with the patient the risks of imiquimod including but not limited to erythema, scaling, itching, weeping, crusting, and pain.  Patient understands that the inflammatory response to imiquimod is variable from person to person and was educated regarded proper titration schedule.  If flu-like symptoms develop, patient knows to discontinue the medication and contact us.
Methotrexate Pregnancy And Lactation Text: This medication is Pregnancy Category X and is known to cause fetal harm. This medication is excreted in breast milk.
High Dose Vitamin A Counseling: Side effects reviewed, pt to contact office should one occur.
Prednisone Counseling:  I discussed with the patient the risks of prolonged use of prednisone including but not limited to weight gain, insomnia, osteoporosis, mood changes, diabetes, susceptibility to infection, glaucoma and high blood pressure.  In cases where prednisone use is prolonged, patients should be monitored with blood pressure checks, serum glucose levels and an eye exam.  Additionally, the patient may need to be placed on GI prophylaxis, PCP prophylaxis, and calcium and vitamin D supplementation and/or a bisphosphonate.  The patient verbalized understanding of the proper use and the possible adverse effects of prednisone.  All of the patient's questions and concerns were addressed.
Dapsone Counseling: I discussed with the patient the risks of dapsone including but not limited to hemolytic anemia, agranulocytosis, rashes, methemoglobinemia, kidney failure, peripheral neuropathy, headaches, GI upset, and liver toxicity.  Patients who start dapsone require monitoring including baseline LFTs and weekly CBCs for the first month, then every month thereafter.  The patient verbalized understanding of the proper use and possible adverse effects of dapsone.  All of the patient's questions and concerns were addressed.
Azathioprine Counseling:  I discussed with the patient the risks of azathioprine including but not limited to myelosuppression, immunosuppression, hepatotoxicity, lymphoma, and infections.  The patient understands that monitoring is required including baseline LFTs, Creatinine, possible TPMP genotyping and weekly CBCs for the first month and then every 2 weeks thereafter.  The patient verbalized understanding of the proper use and possible adverse effects of azathioprine.  All of the patient's questions and concerns were addressed.
Dapsone Pregnancy And Lactation Text: This medication is Pregnancy Category C and is not considered safe during pregnancy or breast feeding.
Valtrex Pregnancy And Lactation Text: this medication is Pregnancy Category B and is considered safe during pregnancy. This medication is not directly found in breast milk but it's metabolite acyclovir is present.
Birth Control Pills Counseling: Birth Control Pill Counseling: I discussed with the patient the potential side effects of OCPs including but not limited to increased risk of stroke, heart attack, thrombophlebitis, deep venous thrombosis, hepatic adenomas, breast changes, GI upset, headaches, and depression.  The patient verbalized understanding of the proper use and possible adverse effects of OCPs. All of the patient's questions and concerns were addressed.
Humira Counseling:  I discussed with the patient the risks of adalimumab including but not limited to myelosuppression, immunosuppression, autoimmune hepatitis, demyelinating diseases, lymphoma, and serious infections.  The patient understands that monitoring is required including a PPD at baseline and must alert us or the primary physician if symptoms of infection or other concerning signs are noted.
Drysol Counseling:  I discussed with the patient the risks of drysol/aluminum chloride including but not limited to skin rash, itching, irritation, burning.
Zyclara Counseling:  I discussed with the patient the risks of imiquimod including but not limited to erythema, scaling, itching, weeping, crusting, and pain.  Patient understands that the inflammatory response to imiquimod is variable from person to person and was educated regarded proper titration schedule.  If flu-like symptoms develop, patient knows to discontinue the medication and contact us.
Doxepin Pregnancy And Lactation Text: This medication is Pregnancy Category C and it isn't known if it is safe during pregnancy. It is also excreted in breast milk and breast feeding isn't recommended.
Niacinamide Counseling: I recommended taking niacin or niacinamide, also know as vitamin B3, twice daily. Recent evidence suggests that taking vitamin B3 (500 mg twice daily) can reduce the risk of actinic keratoses and non-melanoma skin cancers. Side effects of vitamin B3 include flushing and headache.
Rifampin Counseling: I discussed with the patient the risks of rifampin including but not limited to liver damage, kidney damage, red-orange body fluids, nausea/vomiting and severe allergy.
Taltz Counseling: I discussed with the patient the risks of ixekizumab including but not limited to immunosuppression, serious infections, worsening of inflammatory bowel disease and drug reactions.  The patient understands that monitoring is required including a PPD at baseline and must alert us or the primary physician if symptoms of infection or other concerning signs are noted.
Minoxidil Counseling: Minoxidil is a topical medication which can increase blood flow where it is applied. It is uncertain how this medication increases hair growth. Side effects are uncommon and include stinging and allergic reactions.
Hydroxyzine Counseling: Patient advised that the medication is sedating and not to drive a car after taking this medication.  Patient informed of potential adverse effects including but not limited to dry mouth, urinary retention, and blurry vision.  The patient verbalized understanding of the proper use and possible adverse effects of hydroxyzine.  All of the patient's questions and concerns were addressed.
Rifampin Pregnancy And Lactation Text: This medication is Pregnancy Category C and it isn't know if it is safe during pregnancy. It is also excreted in breast milk and should not be used if you are breast feeding.
Azathioprine Pregnancy And Lactation Text: This medication is Pregnancy Category D and isn't considered safe during pregnancy. It is unknown if this medication is excreted in breast milk.
Azithromycin Counseling:  I discussed with the patient the risks of azithromycin including but not limited to GI upset, allergic reaction, drug rash, diarrhea, and yeast infections.
Doxycycline Pregnancy And Lactation Text: This medication is Pregnancy Category D and not consider safe during pregnancy. It is also excreted in breast milk but is considered safe for shorter treatment courses.
High Dose Vitamin A Pregnancy And Lactation Text: High dose vitamin A therapy is contraindicated during pregnancy and breast feeding.
Erythromycin Counseling:  I discussed with the patient the risks of erythromycin including but not limited to GI upset, allergic reaction, drug rash, diarrhea, increase in liver enzymes, and yeast infections.
Birth Control Pills Pregnancy And Lactation Text: This medication should be avoided if pregnant and for the first 30 days post-partum.
Cellcept Counseling:  I discussed with the patient the risks of mycophenolate mofetil including but not limited to infection/immunosuppression, GI upset, hypokalemia, hypercholesterolemia, bone marrow suppression, lymphoproliferative disorders, malignancy, GI ulceration/bleed/perforation, colitis, interstitial lung disease, kidney failure, progressive multifocal leukoencephalopathy, and birth defects.  The patient understands that monitoring is required including a baseline creatinine and regular CBC testing. In addition, patient must alert us immediately if symptoms of infection or other concerning signs are noted.
Drysol Pregnancy And Lactation Text: This medication is considered safe during pregnancy and breast feeding.
Tazorac Counseling:  Patient advised that medication is irritating and drying.  Patient may need to apply sparingly and wash off after an hour before eventually leaving it on overnight.  The patient verbalized understanding of the proper use and possible adverse effects of tazorac.  All of the patient's questions and concerns were addressed.
Cimzia Counseling:  I discussed with the patient the risks of Cimzia including but not limited to immunosuppression, allergic reactions and infections.  The patient understands that monitoring is required including a PPD at baseline and must alert us or the primary physician if symptoms of infection or other concerning signs are noted.
Niacinamide Pregnancy And Lactation Text: These medications are considered safe during pregnancy.
Ketoconazole Counseling:   Patient counseled regarding improving absorption with orange juice.  Adverse effects include but are not limited to breast enlargement, headache, diarrhea, nausea, upset stomach, liver function test abnormalities, taste disturbance, and stomach pain.  There is a rare possibility of liver failure that can occur when taking ketoconazole. The patient understands that monitoring of LFTs may be required, especially at baseline. The patient verbalized understanding of the proper use and possible adverse effects of ketoconazole.  All of the patient's questions and concerns were addressed.
Rituxan Counseling:  I discussed with the patient the risks of Rituxan infusions. Side effects can include infusion reactions, severe drug rashes including mucocutaneous reactions, reactivation of latent hepatitis and other infections and rarely progressive multifocal leukoencephalopathy.  All of the patient's questions and concerns were addressed.
Erivedge Counseling- I discussed with the patient the risks of Erivedge including but not limited to nausea, vomiting, diarrhea, constipation, weight loss, changes in the sense of taste, decreased appetite, muscle spasms, and hair loss.  The patient verbalized understanding of the proper use and possible adverse effects of Erivedge.  All of the patient's questions and concerns were addressed.
Nsaids Counseling: NSAID Counseling: I discussed with the patient that NSAIDs should be taken with food. Prolonged use of NSAIDs can result in the development of stomach ulcers.  Patient advised to stop taking NSAIDs if abdominal pain occurs.  The patient verbalized understanding of the proper use and possible adverse effects of NSAIDs.  All of the patient's questions and concerns were addressed.
Ilumya Counseling: I discussed with the patient the risks of tildrakizumab including but not limited to immunosuppression, malignancy, posterior leukoencephalopathy syndrome, and serious infections.  The patient understands that monitoring is required including a PPD at baseline and must alert us or the primary physician if symptoms of infection or other concerning signs are noted.
Tremfya Counseling: I discussed with the patient the risks of guselkumab including but not limited to immunosuppression, serious infections, worsening of inflammatory bowel disease and drug reactions.  The patient understands that monitoring is required including a PPD at baseline and must alert us or the primary physician if symptoms of infection or other concerning signs are noted.
Hydroxyzine Pregnancy And Lactation Text: This medication is not safe during pregnancy and should not be taken. It is also excreted in breast milk and breast feeding isn't recommended.
Tazorac Pregnancy And Lactation Text: This medication is not safe during pregnancy. It is unknown if this medication is excreted in breast milk.
Tetracycline Counseling: Patient counseled regarding possible photosensitivity and increased risk for sunburn.  Patient instructed to avoid sunlight, if possible.  When exposed to sunlight, patients should wear protective clothing, sunglasses, and sunscreen.  The patient was instructed to call the office immediately if the following severe adverse effects occur:  hearing changes, easy bruising/bleeding, severe headache, or vision changes.  The patient verbalized understanding of the proper use and possible adverse effects of tetracycline.  All of the patient's questions and concerns were addressed. Patient understands to avoid pregnancy while on therapy due to potential birth defects.
Azithromycin Pregnancy And Lactation Text: This medication is considered safe during pregnancy and is also secreted in breast milk.
Picato Counseling:  I discussed with the patient the risks of Picato including but not limited to erythema, scaling, itching, weeping, crusting, and pain.
Bactrim Counseling:  I discussed with the patient the risks of sulfa antibiotics including but not limited to GI upset, allergic reaction, drug rash, diarrhea, dizziness, photosensitivity, and yeast infections.  Rarely, more serious reactions can occur including but not limited to aplastic anemia, agranulocytosis, methemoglobinemia, blood dyscrasias, liver or kidney failure, lung infiltrates or desquamative/blistering drug rashes.
Ketoconazole Pregnancy And Lactation Text: This medication is Pregnancy Category C and it isn't know if it is safe during pregnancy. It is also excreted in breast milk and breast feeding isn't recommended.
Rituxan Pregnancy And Lactation Text: This medication is Pregnancy Category C and it isn't know if it is safe during pregnancy. It is unknown if this medication is excreted in breast milk but similar antibodies are known to be excreted.
Erythromycin Pregnancy And Lactation Text: This medication is Pregnancy Category B and is considered safe during pregnancy. It is also excreted in breast milk.
Cimzia Pregnancy And Lactation Text: This medication crosses the placenta but can be considered safe in certain situations. Cimzia may be excreted in breast milk.
Elidel Counseling: Patient may experience a mild burning sensation during topical application. Elidel is not approved in children less than 2 years of age. There have been case reports of hematologic and skin malignancies in patients using topical calcineurin inhibitors although causality is questionable.
Spironolactone Counseling: Patient advised regarding risks of diarrhea, abdominal pain, hyperkalemia, birth defects (for female patients), liver toxicity and renal toxicity. The patient may need blood work to monitor liver and kidney function and potassium levels while on therapy. The patient verbalized understanding of the proper use and possible adverse effects of spironolactone.  All of the patient's questions and concerns were addressed.
Gabapentin Counseling: I discussed with the patient the risks of gabapentin including but not limited to dizziness, somnolence, fatigue and ataxia.
Acitretin Counseling:  I discussed with the patient the risks of acitretin including but not limited to hair loss, dry lips/skin/eyes, liver damage, hyperlipidemia, depression/suicidal ideation, photosensitivity.  Serious rare side effects can include but are not limited to pancreatitis, pseudotumor cerebri, bony changes, clot formation/stroke/heart attack.  Patient understands that alcohol is contraindicated since it can result in liver toxicity and significantly prolong the elimination of the drug by many years.
Cosentyx Counseling:  I discussed with the patient the risks of Cosentyx including but not limited to worsening of Crohn's disease, immunosuppression, allergic reactions and infections.  The patient understands that monitoring is required including a PPD at baseline and must alert us or the primary physician if symptoms of infection or other concerning signs are noted.
Siliq Counseling:  I discussed with the patient the risks of Siliq including but not limited to new or worsening depression, suicidal thoughts and behavior, immunosuppression, malignancy, posterior leukoencephalopathy syndrome, and serious infections.  The patient understands that monitoring is required including a PPD at baseline and must alert us or the primary physician if symptoms of infection or other concerning signs are noted. There is also a special program designed to monitor depression which is required with Siliq.
Spironolactone Pregnancy And Lactation Text: This medication can cause feminization of the male fetus and should be avoided during pregnancy. The active metabolite is also found in breast milk.
Arava Counseling:  Patient counseled regarding adverse effects of Arava including but not limited to nausea, vomiting, abnormalities in liver function tests. Patients may develop mouth sores, rash, diarrhea, and abnormalities in blood counts. The patient understands that monitoring is required including LFTs and blood counts.  There is a rare possibility of scarring of the liver and lung problems that can occur when taking methotrexate. Persistent nausea, loss of appetite, pale stools, dark urine, cough, and shortness of breath should be reported immediately. Patient advised to discontinue Arava treatment and consult with a physician prior to attempting conception. The patient will have to undergo a treatment to eliminate Arava from the body prior to conception.
Topical Clindamycin Counseling: Patient counseled that this medication may cause skin irritation or allergic reactions.  In the event of skin irritation, the patient was advised to reduce the amount of the drug applied or use it less frequently.   The patient verbalized understanding of the proper use and possible adverse effects of clindamycin.  All of the patient's questions and concerns were addressed.
Nsaids Pregnancy And Lactation Text: These medications are considered safe up to 30 weeks gestation. It is excreted in breast milk.

## 2019-05-01 ENCOUNTER — OFFICE VISIT (OUTPATIENT)
Dept: MEDICAL GROUP | Facility: MEDICAL CENTER | Age: 64
End: 2019-05-01
Payer: COMMERCIAL

## 2019-05-01 ENCOUNTER — HOSPITAL ENCOUNTER (OUTPATIENT)
Dept: RADIOLOGY | Facility: MEDICAL CENTER | Age: 64
End: 2019-05-01
Attending: NURSE PRACTITIONER
Payer: COMMERCIAL

## 2019-05-01 VITALS
RESPIRATION RATE: 16 BRPM | HEART RATE: 79 BPM | HEIGHT: 77 IN | BODY MASS INDEX: 27.28 KG/M2 | WEIGHT: 231 LBS | OXYGEN SATURATION: 96 % | DIASTOLIC BLOOD PRESSURE: 74 MMHG | SYSTOLIC BLOOD PRESSURE: 118 MMHG

## 2019-05-01 DIAGNOSIS — M54.2 CERVICALGIA: ICD-10-CM

## 2019-05-01 DIAGNOSIS — I77.810 ASCENDING AORTA DILATATION (HCC): ICD-10-CM

## 2019-05-01 PROCEDURE — 99213 OFFICE O/P EST LOW 20 MIN: CPT | Performed by: NURSE PRACTITIONER

## 2019-05-01 PROCEDURE — 72040 X-RAY EXAM NECK SPINE 2-3 VW: CPT

## 2019-05-01 ASSESSMENT — ENCOUNTER SYMPTOMS: NECK PAIN: 1

## 2019-05-01 NOTE — PROGRESS NOTES
Subjective:      Tyler Live is a 63 y.o. male who presents with Follow-Up (neck pain, shoulder pain)        CC: Patient here today for complaints of chronic neck pain.    HPI Tyler Live      1. Cervicalgia  Patient states that for about a year he has been having pain in his posterior neck and it has been getting worse.  He states he notices it mostly in the mornings after waking up and going for walks and the pain starts in the mid posterior neck and goes into his right shoulder.  It actually feels better in the evening.  He denies paresthesias or weakness of the extremities.  He did have an MR of his C-spine in 2006 because of complaints of arm pain and weakness and the results at that time showed small central disc protrusion at C3-C4 and C4-C5 as well as small right paramedian disc protrusion at C6-7 and C7-T1 there was also mild to moderate stenosis.    2. Ascending aorta dilatation (HCC)  Most recent echocardiogram from August showed his aortic root increased from 4.2 to 4.3 cm.  Social History   Substance Use Topics   • Smoking status: Never Smoker   • Smokeless tobacco: Never Used   • Alcohol use Yes      Comment: rare     Current Outpatient Prescriptions   Medication Sig Dispense Refill   • meclizine (ANTIVERT) 25 MG Tab Take 1 Tab by mouth 3 times a day as needed. 30 Tab 0     No current facility-administered medications for this visit.    History reviewed. No pertinent past medical history.   Family History   Problem Relation Age of Onset   • Cancer Father         melanoma   • GI Mother    • Cancer Sister         breast   • Cancer Maternal Grandmother         kidney   • Cancer Maternal Grandfather         prostate   • Cancer Paternal Grandmother         liver   • Cancer Paternal Grandfather         throat   • Diabetes Neg Hx    • Heart Disease Neg Hx    • Stroke Neg Hx        Review of Systems   Musculoskeletal: Positive for neck pain.   All other systems reviewed and are  "negative.         Objective:     /74 (BP Location: Right arm, Patient Position: Sitting, BP Cuff Size: Adult)   Pulse 79   Resp 16   Ht 1.956 m (6' 5\")   Wt 104.8 kg (231 lb)   SpO2 96%   BMI 27.39 kg/m²      Physical Exam   Constitutional: He is oriented to person, place, and time. He appears well-developed and well-nourished. No distress.   HENT:   Head: Normocephalic and atraumatic.   Right Ear: External ear normal.   Left Ear: External ear normal.   Nose: Nose normal.   Mouth/Throat: Oropharynx is clear and moist.   Eyes: Conjunctivae are normal. Right eye exhibits no discharge. Left eye exhibits no discharge.   Neck: Normal range of motion. Neck supple. No tracheal deviation present. No thyromegaly present.   Cardiovascular: Normal rate, regular rhythm and normal heart sounds.    No murmur heard.  Pulmonary/Chest: Effort normal and breath sounds normal. No respiratory distress. He has no wheezes. He has no rales.   Musculoskeletal:        Cervical back: He exhibits decreased range of motion and pain.   Patient has 5/5 strength of upper extremities and full range of motion but he does report pain going into his right upper neck and shoulder with head rotation and elevation of right arm.   Lymphadenopathy:     He has no cervical adenopathy.   Neurological: He is alert and oriented to person, place, and time. Coordination normal.   Skin: Skin is warm and dry. No rash noted. He is not diaphoretic. No erythema.   Psychiatric: He has a normal mood and affect. His behavior is normal. Judgment and thought content normal.   Nursing note and vitals reviewed.              Assessment/Plan:     1. Cervicalgia  Patient is having increasing pain radiating from his posterior neck over the past year and therefore I will get x-rays today and then have him do an MR and follow-up with physiatry for treatment options.  He has been going to physiatry for his lower back but admits he has never spoke about his neck with " them.  He may try the salon downing patches and Tylenol for pain.    - DX-CERVICAL SPINE-2 OR 3 VIEWS; Future  - MR-CERVICAL SPINE-W/O; Future  - REFERRAL TO PHYSIATRY (PMR)    2. Ascending aorta dilatation (HCC)  Patient reminded he will do repeat ultrasound in a few months.

## 2019-05-14 ENCOUNTER — HOSPITAL ENCOUNTER (OUTPATIENT)
Dept: RADIOLOGY | Facility: MEDICAL CENTER | Age: 64
End: 2019-05-14
Attending: NURSE PRACTITIONER
Payer: COMMERCIAL

## 2019-05-14 DIAGNOSIS — M54.2 CERVICALGIA: ICD-10-CM

## 2019-05-14 PROCEDURE — 72141 MRI NECK SPINE W/O DYE: CPT

## 2019-06-05 ENCOUNTER — OFFICE VISIT (OUTPATIENT)
Dept: PHYSICAL MEDICINE AND REHAB | Facility: MEDICAL CENTER | Age: 64
End: 2019-06-05
Payer: COMMERCIAL

## 2019-06-05 VITALS
OXYGEN SATURATION: 96 % | SYSTOLIC BLOOD PRESSURE: 120 MMHG | HEIGHT: 76 IN | TEMPERATURE: 97.4 F | DIASTOLIC BLOOD PRESSURE: 72 MMHG | BODY MASS INDEX: 27.95 KG/M2 | HEART RATE: 67 BPM | WEIGHT: 229.5 LBS

## 2019-06-05 DIAGNOSIS — M70.61 GREATER TROCHANTERIC BURSITIS OF RIGHT HIP: ICD-10-CM

## 2019-06-05 DIAGNOSIS — M16.11 ARTHRITIS OF RIGHT HIP: ICD-10-CM

## 2019-06-05 DIAGNOSIS — M54.12 CERVICAL RADICULOPATHY: ICD-10-CM

## 2019-06-05 DIAGNOSIS — M47.812 OSTEOARTHRITIS OF CERVICAL SPINE, UNSPECIFIED SPINAL OSTEOARTHRITIS COMPLICATION STATUS: ICD-10-CM

## 2019-06-05 DIAGNOSIS — M50.20 CERVICAL DISC HERNIATION: ICD-10-CM

## 2019-06-05 DIAGNOSIS — M48.02 CERVICAL SPINAL STENOSIS: ICD-10-CM

## 2019-06-05 PROCEDURE — 99214 OFFICE O/P EST MOD 30 MIN: CPT | Performed by: PHYSICAL MEDICINE & REHABILITATION

## 2019-06-05 ASSESSMENT — PAIN SCALES - GENERAL: PAINLEVEL: 5=MODERATE PAIN

## 2019-06-05 NOTE — PROGRESS NOTES
Follow up patient note  Interventional spine and sports physiatry, Physical medicine rehabilitation      Chief complaint:   Chief Complaint   Patient presents with   • Follow-Up     Neck pain          HISTORY    Please see new patient note dated  by Dr Onofre,  for more details.     HPI  Patient identification: Tyler Live 63 y.o. male  With Diagnoses of Cervical radiculopathy, Greater trochanteric bursitis of right hip, Cervical disc herniation, Cervical spinal stenosis, Osteoarthritis of cervical spine, unspecified spinal osteoarthritis complication status, and Arthritis of right hip were pertinent to this visit.     Chronic right-sided neck pain which has both an axial component and a radicular component radiating down to the right shoulder.  Aching in quality.  5/10 in intensity.  With flares worse with cervical extension.  He is not able to do any bicycling as a form of exercise because of significant pain which happens within a couple of minutes.  Also pain and stiffness with turning the head side to side.  Denies weakness.    Denies hip and lateral hip pain currently. This has improved.      ROS Red Flags :   -  Fever, Chills, Sweats: Denies  Involuntary Weight Loss: Denies  Bowel/Bladder Incontinence: Denies  Saddle Anesthesia: Denies        PMHx:   History reviewed. No pertinent past medical history.    PSHx:   Past Surgical History:   Procedure Laterality Date   • CATARACT EXTRACTION WITH IOL Bilateral    • COLONOSCOPY  age 50    neg   • EYE SURGERY  age 4    R eye,trauma       Family history   Family History   Problem Relation Age of Onset   • Cancer Father         melanoma   • GI Mother    • Cancer Sister         breast   • Cancer Maternal Grandmother         kidney   • Cancer Maternal Grandfather         prostate   • Cancer Paternal Grandmother         liver   • Cancer Paternal Grandfather         throat   • Diabetes Neg Hx    • Heart Disease Neg Hx    • Stroke Neg Hx          Medications:  "  No outpatient prescriptions have been marked as taking for the 6/5/19 encounter (Office Visit) with Mert Onofre M.D..        Current Outpatient Prescriptions on File Prior to Visit   Medication Sig Dispense Refill   • meclizine (ANTIVERT) 25 MG Tab Take 1 Tab by mouth 3 times a day as needed. 30 Tab 0     No current facility-administered medications on file prior to visit.          Allergies:   No Known Allergies    Social Hx:   Social History     Social History   • Marital status:      Spouse name: N/A   • Number of children: N/A   • Years of education: N/A     Occupational History   • Not on file.     Social History Main Topics   • Smoking status: Never Smoker   • Smokeless tobacco: Never Used   • Alcohol use Yes      Comment: rare   • Drug use: No   • Sexual activity: Yes     Partners: Female      Comment: ,  [facility]     Other Topics Concern   •  Service No   • Blood Transfusions No   • Caffeine Concern No   • Occupational Exposure No   • Hobby Hazards No   • Sleep Concern Yes   • Stress Concern No   • Weight Concern No   • Special Diet No   • Back Care No   • Exercise Yes   • Bike Helmet No   • Seat Belt Yes   • Self-Exams Yes     Social History Narrative   • No narrative on file         EXAMINATION     Physical Exam:   Vitals: /72 (BP Location: Right arm, Patient Position: Sitting, BP Cuff Size: Adult)   Pulse 67   Temp 36.3 °C (97.4 °F) (Temporal)   Ht 1.93 m (6' 4\")   Wt 104.1 kg (229 lb 8 oz)   SpO2 96%     Constitutional:   Body Habitus: Body mass index is 27.94 kg/m².  Cooperation: Fully cooperates with exam  Appearance: Well-groomed no disheveled    Respiratory-  breathing comfortable on room air, no audible wheezing  Cardiovascular- capillary refills less than 2 seconds. No lower extremity edema is noted.   Psychiatric- alert and oriented ×3. Normal affect.    MSK: -Mildly limited range of motion in the cervical spine with rotation to the right and left " which is symmetric.  Range of motion is also limited with lateral bending.  Spurling's mildly positive on the right, negative on the left.  Range of motion limited with cervical extension which reproduces pain.  No significant tenderness to the cervical facet joints, trapezius muscles, spinous processes.    Motor Exam Upper Extremities   ? Myotome R L   Shoulder flexion C5 5 5   Elbow flexion C5 5 5   Wrist extension C6 5 5   Elbow extension C7 5 5   Finger flexion C8 5 5   Finger abduction T1 5 5       Key points for the international standards for neurological classification of spinal cord injury (ISNCSCI) to light touch.     Dermatome R L   C4 2 2   C5 2 2   C6 2 2   C7 2 2   C8 2 2   T1 2 2   T2 2 2                                      Reflexes are 2+ in the bilateral biceps and brachioradialis.          MEDICAL DECISION MAKING    DATA    Labs:   Lab Results   Component Value Date/Time    SODIUM 143 08/03/2017 08:07 AM    SODIUM 142 09/03/2008 03:38 PM    POTASSIUM 4.4 08/03/2017 08:07 AM    POTASSIUM 3.9 09/03/2008 03:38 PM    CHLORIDE 105 08/03/2017 08:07 AM    CHLORIDE 110 09/03/2008 03:38 PM    CO2 23 08/03/2017 08:07 AM    CO2 28 09/03/2008 03:38 PM    GLUCOSE 99 08/03/2017 08:07 AM    GLUCOSE 90 09/03/2008 03:38 PM    BUN 15 08/03/2017 08:07 AM    BUN 15 09/03/2008 03:38 PM    CREATININE 1.05 08/03/2017 08:07 AM    CREATININE 1.2 09/03/2008 03:38 PM    BUNCREATRAT 14 08/03/2017 08:07 AM        Lab Results   Component Value Date/Time    PROTHROMBTM 12.0 09/03/2008 03:38 PM    INR 1.03 09/03/2008 03:38 PM        Lab Results   Component Value Date/Time    WBC 7.2 09/03/2008 03:38 PM    RBC 4.39 (L) 09/03/2008 03:38 PM    HEMOGLOBIN 14.5 09/03/2008 03:38 PM    HEMATOCRIT 41.3 (L) 09/03/2008 03:38 PM    MCV 94.1 09/03/2008 03:38 PM    MCH 33.0 09/03/2008 03:38 PM    MCHC 35.1 (H) 09/03/2008 03:38 PM    MPV 8.1 09/03/2008 03:38 PM        No results found for: HBA1C     IMAGING  I reviewed the following studies  and these are my reads.  MRI cervical spine 5/14/2019  Moderate central canal stenosis  C5-6, mild central canal stenosis C4-5  And C4-5 and this is worse at C5-6.  Facet arthropathy worse right worse than left C3-4, C4-5, C5-6.  C5-6 moderate to severe neuroforaminal stenosis.  Severe neuroforaminal stenosis C5-6 bilaterally..    I reviewed the following studies and these are the radiologist read  X-ray cervical spine 5/1/2019  Impression       1.  No compression deformity or acute fracture identified.    2.  There is degenerative disc disease and facet arthropathy.           MRI cervical spine 5/14/2019  FINDINGS:  The cervical spine maintains normal height and alignment. There is no fracture or dislocation. There is no pathologic marrow infiltration.    At the level of C2-3, there is no spinal or neural foraminal stenosis.    At the level of C3-4, there is disc degeneration. There is broad-based central disc protrusion. There is effacement of the ventral subarachnoid space. There is mild central canal stenosis.    At the level of C4-5, there is disc degeneration. There are combinations of diffuse disc bulge and ligamentum flavum hypertrophy causing moderate central canal stenosis. Bilateral uncinate joint arthropathy is seen. There is mild bilateral neural   foraminal stenosis.    At the level of C5-6, there is disc degeneration. Posterior lateral osteophytes and disc bulge are seen. There is moderate to severe central canal and moderate bilateral neural foraminal stenosis.    At the level of C6-7, there is disc degeneration. Mild diffuse disc bulge is seen. There is mild effacement of the ventral subarachnoid space.    At the level of C7-T1, there is mild diffuse disc bulge without significant spinal or neural foraminal stenosis.    At the level of T3-4, there is minimal disc bulge without significant spinal or neural foraminal stenosis.    The visualized posterior fossa structures appear normal within limits.   The cervical spinal cord does not demonstrate any mass or abnormal T2 signal intensity.    Megacisterna magna is seen.    The visualized pre-and paraspinal soft tissues appear normal within limits.   Impression       Multifocal degenerative disease in the cervical spine as described above.               DIAGNOSIS   Visit Diagnoses     ICD-10-CM   1. Cervical radiculopathy M54.12   2. Greater trochanteric bursitis of right hip M70.61   3. Cervical disc herniation M50.20   4. Cervical spinal stenosis M48.02   5. Osteoarthritis of cervical spine, unspecified spinal osteoarthritis complication status M47.812   6. Arthritis of right hip M16.11         ASSESSMENT and PLAN:     Tyler Live 63 y.o. male      Tyler was seen today for follow-up.    Diagnoses and all orders for this visit:    Cervical radiculopathy  -     REFERRAL TO PHYSICIAL MEDICINE REHAB    Greater trochanteric bursitis of right hip  Comments:  significantly improved.     Cervical disc herniation    Cervical spinal stenosis    Osteoarthritis of cervical spine, unspecified spinal osteoarthritis complication status    Arthritis of right hip  Comments:  stable. monitor for now.         There are no signs of neurological weakness on exam.  We discussed emergency signs of spinal stenosis, the patient exhibits none of these now.  for the patient's neck pain is likely secondary to a combination of cervical radiculopathy, cervical spinal stenosis and cervical spondylosis.  These are all worse on the right side and are consistent with imaging.  I would like to start with a cervical epidural C7-T1.  The patient is failed medication management and physical therapy.  He has tried NSAIDs as well.     The risks benefits and alternatives to this procedure were discussed and the patient wishes to proceed with the procedure. Risks include but are not limited to damage to surrounding structures, infection, bleeding, worsening of pain which can be permanent,  weakness which can be permanent. Benefits include pain relief, improved function. Alternatives includes not doing the procedure.         Should the patient fail a cervical epidural but I would consider him for a cervical medial branch block.    Follow up: 2 weeks after the above procedure    Thank you for allowing me to participate in the care of this patient. If you have any questions please not hesitate to contact me.          Please note that this dictation was created using voice recognition software. I have made every reasonable attempt to correct obvious errors but there may be errors of grammar and content that I may have overlooked prior to finalization of this note.      Mert Onofre MD  Interventional Spine and Sports Physiatry  Physical Medicine and Rehabilitation  [facility]  6/5/2019  9:53 AM

## 2019-06-05 NOTE — Clinical Note
Dear TAQUERIA Mullins. ,   Thank you for the referral of Tyler Live.  Please see my note for more details    Should you have any questions or concerns please do not hesitate to contact me.  Mert Onofre M.D.

## 2019-06-05 NOTE — PATIENT INSTRUCTIONS
Your procedure will be at the [facility] special procedure suite.    [facility] [place] [address]       PRE-PROCEDURE INSTRUCTIONS  You may take your regular medications except:   · No Anti-inflammatories 5 days prior to your procedure. Anti-inflammatories include medicines such as  ibuprofen (Motrin, Advil), Excedrin, Naproxen (Aleve, Anaprox, Naprelan, Naprosyn), Celecoxib (Celebrex), Diclofenac (Voltaren-XR tab), and Meloxicam (Mobic).   · No Glucophage or Metformin 24 hours before your procedure. You may resume next day after your procedure.  · Call the physiatry office if you are taking or prescribed anti-biotics within five days of procedure.  · Please ask provider if you are taking any new diabetes medication.  · CONTINUE TAKING BLOOD PRESSURE MEDICATIONS AS PRESCRIBED.  · Pain medications will not be prescribed on the procedure day. Procedural pain medication may be used by your provider   · Call your doctor's office performing the procedure if you have a fever, chills, rash or new illness prior to your procedure    Anticoagulation/antiplatelet medications  No Blood thinning medications such as Coumadin or Plavix 5 days prior to procedure unless your doctor said to continue these medications. Call your doctor if a new medication is prescribed in this class.     Restrictions for eating before procedure:   · If you are getting procedural sedation, then do not eat to for 8 hours prior to procedure appointment time. Do not drink fluids for four hours prior to your procedure time.   · If you are not having procedural sedation, then Skip the meal prior to your procedure. If you have a morning procedure then skip breakfast. If you have an afternoon procedure then skip lunch.   · You may drink clear liquids up to 2 hours prior to your procedure  · You must have a  the day of procedure to accompany you home.      POST PROCEDURE INSTRUCTIONS   · No heavy lifting, strenuous bending or  strenuous exercise for 3 days after your procedure.  · No hot tubs, baths, swimming for 3 days after your procedure  · You can remove the bandage the day after the procedure.  · IF YOU RECEIVED A STEROID INJECTION. PLEASE NOTE THAT THERE MAY BE A DELAY FOR THE INJECTION TO START WORKING, THE DELAY MAY BE UP TO TWO WEEKS. IF YOU HAVE DIABETES, PLEASE NOTE THAT YOUR SUGAR LEVELS MAY BE ELEVATED FOR 1-2 DAYS AFTER A STEROID INJECTION.  · IF YOU EXPERIENCE PROLONGED WEAKNESS LONGER THAN ONE DAY, BOWEL OR BLADDER INCONTINENCE THEN PLEASE CALL THE PHYSIATRY OFFICE.  · Your leg may feel heavy, weak and numb for up to 1-2 days. Be very careful walking.   ·  You may resume normal activities 3 days after procedure.
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
## 2019-06-17 ENCOUNTER — HOSPITAL ENCOUNTER (OUTPATIENT)
Dept: PAIN MANAGEMENT | Facility: REHABILITATION | Age: 64
End: 2019-06-17
Attending: PHYSICAL MEDICINE & REHABILITATION
Payer: COMMERCIAL

## 2019-06-17 ENCOUNTER — HOSPITAL ENCOUNTER (OUTPATIENT)
Dept: RADIOLOGY | Facility: REHABILITATION | Age: 64
End: 2019-06-17
Attending: PHYSICAL MEDICINE & REHABILITATION

## 2019-06-17 VITALS
DIASTOLIC BLOOD PRESSURE: 95 MMHG | RESPIRATION RATE: 16 BRPM | SYSTOLIC BLOOD PRESSURE: 153 MMHG | OXYGEN SATURATION: 97 % | BODY MASS INDEX: 27.59 KG/M2 | WEIGHT: 233.69 LBS | HEART RATE: 68 BPM | HEIGHT: 77 IN | TEMPERATURE: 97.7 F

## 2019-06-17 PROCEDURE — 700117 HCHG RX CONTRAST REV CODE 255

## 2019-06-17 PROCEDURE — 62321 NJX INTERLAMINAR CRV/THRC: CPT

## 2019-06-17 PROCEDURE — 700111 HCHG RX REV CODE 636 W/ 250 OVERRIDE (IP)

## 2019-06-17 RX ORDER — DEXAMETHASONE SODIUM PHOSPHATE 10 MG/ML
INJECTION, SOLUTION INTRAMUSCULAR; INTRAVENOUS
Status: COMPLETED
Start: 2019-06-17 | End: 2019-06-17

## 2019-06-17 RX ORDER — LIDOCAINE HYDROCHLORIDE 10 MG/ML
INJECTION, SOLUTION EPIDURAL; INFILTRATION; INTRACAUDAL; PERINEURAL
Status: COMPLETED
Start: 2019-06-17 | End: 2019-06-17

## 2019-06-17 RX ADMIN — DEXAMETHASONE SODIUM PHOSPHATE 10 MG: 10 INJECTION, SOLUTION INTRAMUSCULAR; INTRAVENOUS at 14:27

## 2019-06-17 RX ADMIN — LIDOCAINE HYDROCHLORIDE 10 ML: 10 INJECTION, SOLUTION EPIDURAL; INFILTRATION; INTRACAUDAL; PERINEURAL at 14:26

## 2019-06-17 RX ADMIN — IOHEXOL 6 ML: 240 INJECTION, SOLUTION INTRATHECAL; INTRAVASCULAR; INTRAVENOUS; ORAL at 14:27

## 2019-06-17 NOTE — NON-PROVIDER
Pt given verbal and written d/c instructions and verbalizes understanding. denies nsaid use in last 3 days, denies blood thinners use in last 5 days, denies current infection or abx use. Med rec complete. Pt has post procedural ride home with wife Samaria.

## 2019-06-17 NOTE — PROCEDURES
Date of Service: 6/17/2019    Physician/s: Mert Onofre MD    Pre-operative Diagnosis: Cervical radiculopathy    Post-operative Diagnosis: Cervical radiculopathy    Procedure: C7-T1  Cervical interlaminar epidural steroid injection    Description of procedure:    The risks, benefits, and alternatives of the procedure were reviewed and discussed with the patient.  Written informed consent was freely obtained. A pre-procedural time-out was conducted by the physician verifying patient’s identity, procedure to be performed, procedure site and side, and allergy verification. Appropriate equipment was determined to be in place for the procedure.       The patient's vital signs were carefully monitored before, throughout, and after the procedure.     In the fluoroscopy suite the patient was placed in a prone position, a pillow placed underneath their chest. The skin was prepped and draped in the usual sterile fashion. The fluoroscope was placed over the cervical neck at the appropriate injection AP angle view, and the target for injection was marked. A 25g needle was placed into the marked site, and approx 2cc of 1% Lidocaine was injected subcutaneously into the epidermal and dermal layers. The needle was removed. A 20g Tuohy needle was then placed and advanced under fluoroscopic guidance into the right paramidline C7-T1 interlaminar space at both the initial position AP view and contralateral oblique at a lateral view to ensure proper location of the needle tip at all times. A hang drop technique was used to guide the needle into the epidural space in a lateral fluoroscopic view and confirmed with loss of resistance with sterile normal saline. In the AP and lateral views, contrast dye was used.  to highlight the epidural space spread while the fluoroscope was running live. Following negative aspiration, 2mL of 1% lidocaine and 1mL of 10mg/mL of dexamethasone and the needle was removed intact after restyleted. The  patient's back was covered with a 4x4 gauze, the area was cleansed with sterile normal saline, and a dressing was applied. There were no complications noted.     The patient was then evaluated post-procedure, and was hemodynamically stable prior to leaving the post-operative care unit.     Mert Onofre MD  Physical Medicine and Rehabilitation  Interventional Spine and Sports Physiatry  [facility]        CPT  interlaminar cervical epidural: [id number]

## 2019-06-18 ENCOUNTER — TELEPHONE (OUTPATIENT)
Dept: PHYSICAL MEDICINE AND REHAB | Facility: MEDICAL CENTER | Age: 64
End: 2019-06-18

## 2019-06-18 NOTE — TELEPHONE ENCOUNTER
Spoke to Pt in regards to his Special procedure that was done 06/17/19 w/ Dr. Onofre and he mentioned that he is doing fine.    Thank you  Suri

## 2019-07-08 ENCOUNTER — OFFICE VISIT (OUTPATIENT)
Dept: PHYSICAL MEDICINE AND REHAB | Facility: MEDICAL CENTER | Age: 64
End: 2019-07-08
Payer: COMMERCIAL

## 2019-07-08 VITALS
HEART RATE: 62 BPM | DIASTOLIC BLOOD PRESSURE: 64 MMHG | WEIGHT: 233.25 LBS | OXYGEN SATURATION: 97 % | SYSTOLIC BLOOD PRESSURE: 118 MMHG | TEMPERATURE: 97.8 F | HEIGHT: 77 IN | BODY MASS INDEX: 27.54 KG/M2

## 2019-07-08 DIAGNOSIS — M16.0 BILATERAL HIP JOINT ARTHRITIS: ICD-10-CM

## 2019-07-08 DIAGNOSIS — M50.20 CERVICAL DISC HERNIATION: ICD-10-CM

## 2019-07-08 DIAGNOSIS — M54.12 CERVICAL RADICULOPATHY: ICD-10-CM

## 2019-07-08 DIAGNOSIS — M48.02 CERVICAL SPINAL STENOSIS: ICD-10-CM

## 2019-07-08 DIAGNOSIS — M47.812 OSTEOARTHRITIS OF CERVICAL SPINE, UNSPECIFIED SPINAL OSTEOARTHRITIS COMPLICATION STATUS: ICD-10-CM

## 2019-07-08 DIAGNOSIS — M70.61 GREATER TROCHANTERIC BURSITIS OF RIGHT HIP: ICD-10-CM

## 2019-07-08 PROCEDURE — 99214 OFFICE O/P EST MOD 30 MIN: CPT | Performed by: PHYSICAL MEDICINE & REHABILITATION

## 2019-07-08 ASSESSMENT — PATIENT HEALTH QUESTIONNAIRE - PHQ9: CLINICAL INTERPRETATION OF PHQ2 SCORE: 0

## 2019-07-08 ASSESSMENT — PAIN SCALES - GENERAL: PAINLEVEL: 1=MINIMAL PAIN

## 2019-07-08 NOTE — PROGRESS NOTES
Follow up patient note  Interventional spine and sports physiatry, Physical medicine rehabilitation      Chief complaint:   Chief Complaint   Patient presents with   • Follow-Up     Back pain          HISTORY    Please see new patient note dated  by Dr Onofre,  for more details.     HPI  Patient identification: Tyler Live 63 y.o. male  With Diagnoses of Cervical radiculopathy, Cervical disc herniation, Cervical spinal stenosis, Osteoarthritis of cervical spine, unspecified spinal osteoarthritis complication status, Greater trochanteric bursitis of right hip, and Bilateral hip joint arthritis were pertinent to this visit.       Chronic right-sided neck pain rating down the arm significantly improved and 80% improvement compared to preprocedure.  Preprocedure patient had 5 out of 10 pain and now he has 1 out of 10 pain on the right side of the neck radiating towards the right shoulder, aching quality.  Overall this is well controlled.  He is able to go on his walks, hikes, exercise with no significant difficulties.  He is very happy with these results.  He still is unable to go cycling as cervical extension causes pain however he just avoids this particular activity.    He does have intermittent 1 out of 10 pain in the bilateral lateral hips however this is not flared like it was last year.  This is an aching type pain.  He is still able to sleep on both sides.  He is able to get through all of his workouts.       ROS Red Flags :   -  Fever, Chills, Sweats: Denies  Involuntary Weight Loss: Denies  Bowel/Bladder Incontinence: Denies  Saddle Anesthesia: Denies        PMHx:   History reviewed. No pertinent past medical history.    PSHx:   Past Surgical History:   Procedure Laterality Date   • CATARACT EXTRACTION WITH IOL Bilateral    • COLONOSCOPY  age 50    neg   • EYE SURGERY  age 4    R eye,trauma       Family history   Family History   Problem Relation Age of Onset   • Cancer Father         melanoma   • GI  "Mother    • Cancer Sister         breast   • Cancer Maternal Grandmother         kidney   • Cancer Maternal Grandfather         prostate   • Cancer Paternal Grandmother         liver   • Cancer Paternal Grandfather         throat   • Diabetes Neg Hx    • Heart Disease Neg Hx    • Stroke Neg Hx          Medications:   No outpatient prescriptions have been marked as taking for the 7/8/19 encounter (Office Visit) with Mert Onofre M.D..        Current Outpatient Prescriptions on File Prior to Visit   Medication Sig Dispense Refill   • meclizine (ANTIVERT) 25 MG Tab Take 1 Tab by mouth 3 times a day as needed. (Patient not taking: Reported on 7/8/2019) 30 Tab 0     No current facility-administered medications on file prior to visit.          Allergies:   No Known Allergies    Social Hx:   Social History     Social History   • Marital status:      Spouse name: N/A   • Number of children: N/A   • Years of education: N/A     Occupational History   • Not on file.     Social History Main Topics   • Smoking status: Never Smoker   • Smokeless tobacco: Never Used   • Alcohol use Yes      Comment: rare   • Drug use: No   • Sexual activity: Yes     Partners: Female      Comment: ,  [employer]     Other Topics Concern   •  Service No   • Blood Transfusions No   • Caffeine Concern No   • Occupational Exposure No   • Hobby Hazards No   • Sleep Concern Yes   • Stress Concern No   • Weight Concern No   • Special Diet No   • Back Care No   • Exercise Yes   • Bike Helmet No   • Seat Belt Yes   • Self-Exams Yes     Social History Narrative   • No narrative on file         EXAMINATION     Physical Exam:   Vitals: /64 (BP Location: Right arm, Patient Position: Sitting, BP Cuff Size: Adult)   Pulse 62   Temp 36.6 °C (97.8 °F) (Temporal)   Ht 1.943 m (6' 4.5\")   Wt 105.8 kg (233 lb 4 oz)   SpO2 97%     Constitutional:   Body Habitus: Body mass index is 28.02 kg/m².  Cooperation: Fully cooperates " with exam  Appearance: Well-groomed no disheveled    Respiratory-  breathing comfortable on room air, no audible wheezing  Cardiovascular- capillary refills less than 2 seconds. No lower extremity edema is noted.   Psychiatric- alert and oriented ×3. Normal affect.    MSK: -Mildly limited range of motion in the cervical spine with rotation to the right and left which is symmetric.  Range of motion is also limited with lateral bending.  Spurling's mildly positive on the right, negative on the left.  Range of motion limited with cervical extension which reproduces pain.  No significant tenderness to the cervical facet joints, trapezius muscles, spinous processes.    Motor Exam Upper Extremities   ? Myotome R L   Shoulder flexion C5 5 5   Elbow flexion C5 5 5   Wrist extension C6 5 5   Elbow extension C7 5 5   Finger flexion C8 5 5   Finger abduction T1 5 5       Key points for the international standards for neurological classification of spinal cord injury (ISNCSCI) to light touch.     Dermatome R L   C4 2 2   C5 2 2   C6 2 2   C7 2 2   C8 2 2   T1 2 2   T2 2 2                                      Reflexes are 2+ in the bilateral biceps and brachioradialis.          MEDICAL DECISION MAKING    DATA    Labs:   Lab Results   Component Value Date/Time    SODIUM 143 08/03/2017 08:07 AM    SODIUM 142 09/03/2008 03:38 PM    POTASSIUM 4.4 08/03/2017 08:07 AM    POTASSIUM 3.9 09/03/2008 03:38 PM    CHLORIDE 105 08/03/2017 08:07 AM    CHLORIDE 110 09/03/2008 03:38 PM    CO2 23 08/03/2017 08:07 AM    CO2 28 09/03/2008 03:38 PM    GLUCOSE 99 08/03/2017 08:07 AM    GLUCOSE 90 09/03/2008 03:38 PM    BUN 15 08/03/2017 08:07 AM    BUN 15 09/03/2008 03:38 PM    CREATININE 1.05 08/03/2017 08:07 AM    CREATININE 1.2 09/03/2008 03:38 PM    BUNCREATRAT 14 08/03/2017 08:07 AM        Lab Results   Component Value Date/Time    PROTHROMBTM 12.0 09/03/2008 03:38 PM    INR 1.03 09/03/2008 03:38 PM        Lab Results   Component Value Date/Time     WBC 7.2 09/03/2008 03:38 PM    RBC 4.39 (L) 09/03/2008 03:38 PM    HEMOGLOBIN 14.5 09/03/2008 03:38 PM    HEMATOCRIT 41.3 (L) 09/03/2008 03:38 PM    MCV 94.1 09/03/2008 03:38 PM    MCH 33.0 09/03/2008 03:38 PM    MCHC 35.1 (H) 09/03/2008 03:38 PM    MPV 8.1 09/03/2008 03:38 PM        No results found for: HBA1C     IMAGING  I reviewed the following studies and these are my reads.  MRI cervical spine 5/14/2019  Moderate central canal stenosis  C5-6, mild central canal stenosis C4-5  And C4-5 and this is worse at C5-6.  Facet arthropathy worse right worse than left C3-4, C4-5, C5-6.  C5-6 moderate to severe neuroforaminal stenosis.  Severe neuroforaminal stenosis C5-6 bilaterally..    I reviewed the following studies and these are the radiologist read  X-ray cervical spine 5/1/2019  Impression       1.  No compression deformity or acute fracture identified.    2.  There is degenerative disc disease and facet arthropathy.           MRI cervical spine 5/14/2019  FINDINGS:  The cervical spine maintains normal height and alignment. There is no fracture or dislocation. There is no pathologic marrow infiltration.    At the level of C2-3, there is no spinal or neural foraminal stenosis.    At the level of C3-4, there is disc degeneration. There is broad-based central disc protrusion. There is effacement of the ventral subarachnoid space. There is mild central canal stenosis.    At the level of C4-5, there is disc degeneration. There are combinations of diffuse disc bulge and ligamentum flavum hypertrophy causing moderate central canal stenosis. Bilateral uncinate joint arthropathy is seen. There is mild bilateral neural   foraminal stenosis.    At the level of C5-6, there is disc degeneration. Posterior lateral osteophytes and disc bulge are seen. There is moderate to severe central canal and moderate bilateral neural foraminal stenosis.    At the level of C6-7, there is disc degeneration. Mild diffuse disc bulge is seen.  There is mild effacement of the ventral subarachnoid space.    At the level of C7-T1, there is mild diffuse disc bulge without significant spinal or neural foraminal stenosis.    At the level of T3-4, there is minimal disc bulge without significant spinal or neural foraminal stenosis.    The visualized posterior fossa structures appear normal within limits.  The cervical spinal cord does not demonstrate any mass or abnormal T2 signal intensity.    Megacisterna magna is seen.    The visualized pre-and paraspinal soft tissues appear normal within limits.   Impression       Multifocal degenerative disease in the cervical spine as described above.               DIAGNOSIS   Visit Diagnoses     ICD-10-CM   1. Cervical radiculopathy M54.12   2. Cervical disc herniation M50.20   3. Cervical spinal stenosis M48.02   4. Osteoarthritis of cervical spine, unspecified spinal osteoarthritis complication status M47.812   5. Greater trochanteric bursitis of right hip M70.61   6. Bilateral hip joint arthritis M16.0         ASSESSMENT and PLAN:     Tyler Live 63 y.o. male      Tyler was seen today for follow-up.    Diagnoses and all orders for this visit:    Cervical radiculopathy  -     REFERRAL TO PHYSICAL THERAPY Reason for Therapy: Eval/Treat/Report    Cervical disc herniation  -     REFERRAL TO PHYSICAL THERAPY Reason for Therapy: Eval/Treat/Report    Cervical spinal stenosis  -     REFERRAL TO PHYSICAL THERAPY Reason for Therapy: Eval/Treat/Report    Osteoarthritis of cervical spine, unspecified spinal osteoarthritis complication status  -     REFERRAL TO PHYSICAL THERAPY Reason for Therapy: Eval/Treat/Report    Greater trochanteric bursitis of right hip  -     REFERRAL TO PHYSICAL THERAPY Reason for Therapy: Eval/Treat/Report    Bilateral hip joint arthritis  -     REFERRAL TO PHYSICAL THERAPY Reason for Therapy: Eval/Treat/Report         Continue Tylenol as needed less than 3000 mg/day.    We discussed physical  therapy as well as stretching and strengthening exercises for the patient to do at home.  I would like for him to begin work with physical therapy for the hips but mostly focusing on the neck and shoulders.  Also to focus on posture.  Neurologically the patient is intact and responded very well to a cervical epidural.  Bilateral hip joint arthritis is stable and he does have some right greater trochanter syndrome but this is currently mild and I think he will respond well to physical therapy.    The patient did not have a car accident    Follow up: after PT    Thank you for allowing me to participate in the care of this patient. If you have any questions please not hesitate to contact me.          Please note that this dictation was created using voice recognition software. I have made every reasonable attempt to correct obvious errors but there may be errors of grammar and content that I may have overlooked prior to finalization of this note.      Mert Onofre MD  Interventional Spine and Sports Physiatry  Physical Medicine and Rehabilitation  [facility]  7/8/2019  10:02 AM

## 2019-08-01 ENCOUNTER — OFFICE VISIT (OUTPATIENT)
Dept: MEDICAL GROUP | Facility: MEDICAL CENTER | Age: 64
End: 2019-08-01
Payer: COMMERCIAL

## 2019-08-01 VITALS
TEMPERATURE: 98.2 F | SYSTOLIC BLOOD PRESSURE: 132 MMHG | OXYGEN SATURATION: 96 % | RESPIRATION RATE: 16 BRPM | HEART RATE: 72 BPM | HEIGHT: 77 IN | DIASTOLIC BLOOD PRESSURE: 74 MMHG | BODY MASS INDEX: 28.02 KG/M2

## 2019-08-01 DIAGNOSIS — I77.810 ASCENDING AORTA DILATATION (HCC): ICD-10-CM

## 2019-08-01 DIAGNOSIS — R06.02 SOB (SHORTNESS OF BREATH) ON EXERTION: ICD-10-CM

## 2019-08-01 DIAGNOSIS — E78.5 DYSLIPIDEMIA: ICD-10-CM

## 2019-08-01 DIAGNOSIS — Z11.59 NEED FOR HEPATITIS C SCREENING TEST: ICD-10-CM

## 2019-08-01 DIAGNOSIS — Z00.00 ROUTINE GENERAL MEDICAL EXAMINATION AT A HEALTH CARE FACILITY: ICD-10-CM

## 2019-08-01 PROCEDURE — 99396 PREV VISIT EST AGE 40-64: CPT | Performed by: NURSE PRACTITIONER

## 2019-08-01 ASSESSMENT — ENCOUNTER SYMPTOMS: SHORTNESS OF BREATH: 1

## 2019-08-01 NOTE — PROGRESS NOTES
Subjective:      Tyler Live is a 64 y.o. male who presents with Annual Exam        CC: Patient is here today for annual preventative visit.    HPI       1. Routine general medical examination at a health care facility  Patient was last seen a year ago for his physical but last lab work was from 2017 showing normal PSA, good LDL but low HDL, and normal chemistry panel.    2. Dyslipidemia  There is a yellow warning box for patient that is ASCVD score is elevated based on his previous cholesterol but he would like to recheck this first.    3. Ascending aorta dilatation (HCC)  Previous echocardiogram showed a sending aorta mildly dilated 4.3 cm which was slightly higher than previous 4.2 with good ejection fraction at 65% and normal valves.  He is due for yearly testing.    4. SOB (shortness of breath) on exertion  Patient states he still continues to jog but where as he used to be able to jog 3 miles he finds he becomes more fatigued after 1 mile now.  He states he had some sort of cardiac work-up 10 years ago and was told he had mild buildup of plaque and should have further testing in about 10 years.  There is a cardiac perfusion scan from 2008 showing no evidence of infarct but lateral apical ischemia.    5. Need for hepatitis C screening test  Patient due for screening  History reviewed. No pertinent past medical history.  Social History     Socioeconomic History   • Marital status:      Spouse name: Not on file   • Number of children: Not on file   • Years of education: Not on file   • Highest education level: Not on file   Occupational History   • Not on file   Social Needs   • Financial resource strain: Not on file   • Food insecurity:     Worry: Not on file     Inability: Not on file   • Transportation needs:     Medical: Not on file     Non-medical: Not on file   Tobacco Use   • Smoking status: Never Smoker   • Smokeless tobacco: Never Used   Substance and Sexual Activity   • Alcohol use: Yes  "    Comment: rare   • Drug use: No   • Sexual activity: Yes     Partners: Female     Comment: ,  [employer]   Lifestyle   • Physical activity:     Days per week: Not on file     Minutes per session: Not on file   • Stress: Not on file   Relationships   • Social connections:     Talks on phone: Not on file     Gets together: Not on file     Attends Worship service: Not on file     Active member of club or organization: Not on file     Attends meetings of clubs or organizations: Not on file     Relationship status: Not on file   • Intimate partner violence:     Fear of current or ex partner: Not on file     Emotionally abused: Not on file     Physically abused: Not on file     Forced sexual activity: Not on file   Other Topics Concern   •  Service No   • Blood Transfusions No   • Caffeine Concern No   • Occupational Exposure No   • Hobby Hazards No   • Sleep Concern Yes   • Stress Concern No   • Weight Concern No   • Special Diet No   • Back Care No   • Exercise Yes   • Bike Helmet No   • Seat Belt Yes   • Self-Exams Yes   Social History Narrative   • Not on file     No current outpatient medications on file.     No current facility-administered medications for this visit.      Family History   Problem Relation Age of Onset   • Cancer Father         melanoma   • GI Disease Mother    • Cancer Sister         breast   • Cancer Maternal Grandmother         kidney   • Cancer Maternal Grandfather         prostate   • Cancer Paternal Grandmother         liver   • Cancer Paternal Grandfather         throat   • Diabetes Neg Hx    • Heart Disease Neg Hx    • Stroke Neg Hx          Review of Systems   Respiratory: Positive for shortness of breath.           Objective:     /74 (BP Location: Right arm, Patient Position: Sitting, BP Cuff Size: Adult)   Pulse 72   Temp 36.8 °C (98.2 °F) (Temporal)   Resp 16   Ht 1.943 m (6' 4.5\")   SpO2 96%   BMI 28.02 kg/m²      Physical Exam   Constitutional: " He is oriented to person, place, and time. He appears well-developed and well-nourished. No distress.   HENT:   Head: Normocephalic and atraumatic.   Right Ear: External ear normal.   Left Ear: External ear normal.   Nose: Nose normal.   Mouth/Throat: Oropharynx is clear and moist.   Eyes: Conjunctivae are normal. Right eye exhibits no discharge. Left eye exhibits no discharge.   Neck: Normal range of motion. Neck supple. No tracheal deviation present. No thyromegaly present.   Cardiovascular: Normal rate, regular rhythm and normal heart sounds.   No murmur heard.  Pulmonary/Chest: Effort normal and breath sounds normal. No respiratory distress. He has no wheezes. He has no rales.   Lymphadenopathy:     He has no cervical adenopathy.   Neurological: He is alert and oriented to person, place, and time. Coordination normal.   Skin: Skin is warm and dry. No rash noted. He is not diaphoretic. No erythema.   Psychiatric: He has a normal mood and affect. His behavior is normal. Judgment and thought content normal.   Nursing note and vitals reviewed.              Assessment/Plan:     1. Routine general medical examination at a health care facility  Patient reminded to contact me before his yearly physicals so we can do lab work and he can then follow-up to discuss results.  - Comp Metabolic Panel; Future  - Lipid Profile; Future  - PROSTATE SPECIFIC AG SCREENING; Future  - TSH; Future    2. Dyslipidemia  Patient's ASCVD score is elevated based on his 2017 labs and I will have him do lab work this week and if it is elevated again, I advised him we should start him on a statin and the potential side effects to be aware of them report.    3. Ascending aorta dilatation (HCC)  Dilatation only at 4.3 cm but he is due for follow-up.  - EC-ECHOCARDIOGRAM COMPLETE W/O CONT; Future    4. SOB (shortness of breath) on exertion  Patient reports no chest pain or shortness of breath with slight exertion and is able to jog at least a mile  but he does not have the same stamina as he did 2 years ago and with what he is telling me from 10 years ago with recommendation to have another stress test, I will set him up for this today and refer him to cardiology if necessary.  - NM-HEART MUSCLE IMAGE,SPECT MULT; Future    5. Need for hepatitis C screening test    - HEP C VIRUS ANTIBODY; Future

## 2019-08-03 LAB
ALBUMIN SERPL-MCNC: 4.5 G/DL (ref 3.6–4.8)
ALBUMIN/GLOB SERPL: 1.9 {RATIO} (ref 1.2–2.2)
ALP SERPL-CCNC: 65 IU/L (ref 39–117)
ALT SERPL-CCNC: 26 IU/L (ref 0–44)
AST SERPL-CCNC: 23 IU/L (ref 0–40)
BILIRUB SERPL-MCNC: 0.8 MG/DL (ref 0–1.2)
BUN SERPL-MCNC: 11 MG/DL (ref 8–27)
BUN/CREAT SERPL: 10 (ref 10–24)
CALCIUM SERPL-MCNC: 9.8 MG/DL (ref 8.6–10.2)
CHLORIDE SERPL-SCNC: 102 MMOL/L (ref 96–106)
CHOLEST SERPL-MCNC: 157 MG/DL (ref 100–199)
CO2 SERPL-SCNC: 25 MMOL/L (ref 20–29)
CREAT SERPL-MCNC: 1.09 MG/DL (ref 0.76–1.27)
GLOBULIN SER CALC-MCNC: 2.4 G/DL (ref 1.5–4.5)
GLUCOSE SERPL-MCNC: 97 MG/DL (ref 65–99)
HCV AB S/CO SERPL IA: 0.2 S/CO RATIO (ref 0–0.9)
HDLC SERPL-MCNC: 32 MG/DL
LABORATORY COMMENT REPORT: ABNORMAL
LDLC SERPL CALC-MCNC: 96 MG/DL (ref 0–99)
POTASSIUM SERPL-SCNC: 4.7 MMOL/L (ref 3.5–5.2)
PROT SERPL-MCNC: 6.9 G/DL (ref 6–8.5)
PSA SERPL-MCNC: 2.4 NG/ML (ref 0–4)
SODIUM SERPL-SCNC: 142 MMOL/L (ref 134–144)
TRIGL SERPL-MCNC: 147 MG/DL (ref 0–149)
TSH SERPL DL<=0.005 MIU/L-ACNC: 1.92 UIU/ML (ref 0.45–4.5)
VLDLC SERPL CALC-MCNC: 29 MG/DL (ref 5–40)

## 2019-08-06 ENCOUNTER — APPOINTMENT (OUTPATIENT)
Dept: PHYSICAL THERAPY | Facility: MEDICAL CENTER | Age: 64
End: 2019-08-06
Payer: COMMERCIAL

## 2019-08-12 ENCOUNTER — OFFICE VISIT (OUTPATIENT)
Dept: URGENT CARE | Facility: CLINIC | Age: 64
End: 2019-08-12
Payer: COMMERCIAL

## 2019-08-12 VITALS
BODY MASS INDEX: 27.39 KG/M2 | TEMPERATURE: 97.9 F | HEIGHT: 77 IN | SYSTOLIC BLOOD PRESSURE: 130 MMHG | DIASTOLIC BLOOD PRESSURE: 100 MMHG | HEART RATE: 61 BPM | RESPIRATION RATE: 17 BRPM | OXYGEN SATURATION: 96 % | WEIGHT: 232 LBS

## 2019-08-12 DIAGNOSIS — S05.02XA ABRASION OF LEFT CORNEA, INITIAL ENCOUNTER: ICD-10-CM

## 2019-08-12 PROCEDURE — 99214 OFFICE O/P EST MOD 30 MIN: CPT | Performed by: NURSE PRACTITIONER

## 2019-08-12 RX ORDER — ERYTHROMYCIN 5 MG/G
0.5 OINTMENT OPHTHALMIC 4 TIMES DAILY
Qty: 1 TUBE | Refills: 0 | Status: SHIPPED | OUTPATIENT
Start: 2019-08-12 | End: 2019-08-17

## 2019-08-12 NOTE — PROGRESS NOTES
Chief Complaint   Patient presents with   • Eye Problem     started this morning after the shower, pos scratch or something inside       HISTORY OF PRESENT ILLNESS: Patient is a 64 y.o. male who presents to urgent care today with complaints of left eye irritation. He was in the shower this morning when he developed foreign body sensation to his left eye.  He immediately developed irritation to the region.  He has had redness, watering, and irritation since.  He denies any eye pain, changes in vision, photophobia.  He otherwise denies any recent injury or trauma.  He does not wear contacts or glasses.  He has tried OTC eyedrops for symptom relief.    Patient Active Problem List    Diagnosis Date Noted   • Dyslipidemia 2019   • Ascending aorta dilatation (HCC) 2017   • Meniere disease 2017   • Insomnia due to medical condition 2015   • Eczema 2015       Allergies:Patient has no known allergies.    Current Outpatient Medications Ordered in Epic   Medication Sig Dispense Refill   • erythromycin 5 MG/GM Ointment Place 0.5 Inches in left eye 4 times a day for 5 days. 1 Tube 0     No current Epic-ordered facility-administered medications on file.        History reviewed. No pertinent past medical history.    Social History     Tobacco Use   • Smoking status: Never Smoker   • Smokeless tobacco: Never Used   Substance Use Topics   • Alcohol use: Yes     Comment: rare   • Drug use: No       Family Status   Relation Name Status   • Fa     • Mo  Alive   • Sis  (Not Specified)   • MGMo  (Not Specified)   • MGFa  (Not Specified)   • PGMo  (Not Specified)   • PGFa  (Not Specified)   • Neg Hx  (Not Specified)     Family History   Problem Relation Age of Onset   • Cancer Father         melanoma   • GI Disease Mother    • Cancer Sister         breast   • Cancer Maternal Grandmother         kidney   • Cancer Maternal Grandfather         prostate   • Cancer Paternal Grandmother         liver   •  "Cancer Paternal Grandfather         throat   • Diabetes Neg Hx    • Heart Disease Neg Hx    • Stroke Neg Hx        ROS:  Review of Systems   Constitutional: Negative for fever, chills, weight loss, malaise, and fatigue.   HENT: Negative for ear pain, nosebleeds, congestion, sore throat and neck pain.    Eyes: Positive for left eye irritation, tearing, foreign body sensation.  Negative for vision changes, eye pain, photophobia.   Neuro: Negative for headache, sensory changes, weakness, seizure, LOC.   Cardiovascular: Negative for chest pain, palpitations, orthopnea and leg swelling.   Respiratory: Negative for cough, sputum production, shortness of breath and wheezing.   Gastrointestinal: Negative for abdominal pain, nausea, vomiting or diarrhea.   Genitourinary: Negative for dysuria, urgency and frequency.  Musculoskeletal: Negative for falls, neck pain, back pain, joint pain, myalgias.   Skin: Negative for rash, diaphoresis.     Exam:  /100   Pulse 61   Temp 36.6 °C (97.9 °F)   Resp 17   Ht 1.956 m (6' 5\")   Wt 105.2 kg (232 lb)   SpO2 96%   General: well-nourished, well-developed male in NAD  Head: normocephalic, atraumatic  Eyes: PERRLA, left conjunctival injection, acuity grossly intact, lids normal. Fluorescein exam shows 2 mm linear abrasion to 2 o'clock position, no foreign bodies identified.  Ears: normal shape and symmetry, no tenderness, no discharge. External canals are without any significant edema or erythema. Tympanic membranes are without any inflammation, no effusion. Gross auditory acuity is intact.  Nose: symmetrical without tenderness, no discharge.  Mouth/Throat: reasonable hygiene, no erythema, exudates or tonsillar enlargement.  Neck: no masses, range of motion within normal limits, no tracheal deviation. No obvious thyroid enlargement.   Lymph: no cervical adenopathy. No supraclavicular adenopathy.   Neuro: alert and oriented. Cranial nerves 1-12 grossly intact. No sensory deficit. "   Cardiovascular: regular rate and rhythm. No edema.  Pulmonary: no distress. Chest is symmetrical with respiration, no wheezes, crackles, or rhonchi.   Musculoskeletal: no clubbing, appropriate muscle tone, gait is stable.  Skin: warm, dry, intact, no clubbing, no cyanosis, no rashes.   Psych: appropriate mood, affect, judgement.         Assessment/Plan:  1. Abrasion of left cornea, initial encounter  erythromycin 5 MG/GM Ointment       Presentation consistent with abrasion.  Erythromycin provided.  Supportive care, differential diagnoses, and indications for immediate follow-up discussed with patient.   Pathogenesis of diagnosis discussed including typical length and natural progression.   Instructed to return to clinic or nearest emergency department for any change in condition, further concerns, or worsening of symptoms.  Patient states understanding of the plan of care and discharge instructions.  Instructed to make an appointment, for follow up, with his eye specialist without improvement.         Please note that this dictation was created using voice recognition software. I have made every reasonable attempt to correct obvious errors, but I expect that there are errors of grammar and possibly content that I did not discover before finalizing the note.      DORA Hodges.

## 2019-08-13 ENCOUNTER — APPOINTMENT (OUTPATIENT)
Dept: PHYSICAL THERAPY | Facility: MEDICAL CENTER | Age: 64
End: 2019-08-13
Payer: COMMERCIAL

## 2019-08-13 ENCOUNTER — HOSPITAL ENCOUNTER (OUTPATIENT)
Dept: CARDIOLOGY | Facility: MEDICAL CENTER | Age: 64
End: 2019-08-13
Attending: NURSE PRACTITIONER
Payer: COMMERCIAL

## 2019-08-13 DIAGNOSIS — I77.810 ASCENDING AORTA DILATATION (HCC): ICD-10-CM

## 2019-08-13 LAB
LV EJECT FRACT  99904: 65
LV EJECT FRACT MOD 2C 99903: 68.97
LV EJECT FRACT MOD 4C 99902: 60.79
LV EJECT FRACT MOD BP 99901: 65.82

## 2019-08-13 PROCEDURE — 93306 TTE W/DOPPLER COMPLETE: CPT

## 2019-08-13 PROCEDURE — 93306 TTE W/DOPPLER COMPLETE: CPT | Mod: 26 | Performed by: INTERNAL MEDICINE

## 2019-08-15 ENCOUNTER — APPOINTMENT (OUTPATIENT)
Dept: PHYSICAL THERAPY | Facility: REHABILITATION | Age: 64
End: 2019-08-15
Payer: COMMERCIAL

## 2019-08-16 ENCOUNTER — PHYSICAL THERAPY (OUTPATIENT)
Dept: PHYSICAL THERAPY | Facility: REHABILITATION | Age: 64
End: 2019-08-16
Attending: PHYSICAL MEDICINE & REHABILITATION
Payer: COMMERCIAL

## 2019-08-16 DIAGNOSIS — M16.0 BILATERAL HIP JOINT ARTHRITIS: ICD-10-CM

## 2019-08-16 DIAGNOSIS — M70.61 GREATER TROCHANTERIC BURSITIS OF RIGHT HIP: ICD-10-CM

## 2019-08-16 DIAGNOSIS — M50.20 CERVICAL DISC HERNIATION: ICD-10-CM

## 2019-08-16 DIAGNOSIS — M47.812 OSTEOARTHRITIS OF CERVICAL SPINE, UNSPECIFIED SPINAL OSTEOARTHRITIS COMPLICATION STATUS: ICD-10-CM

## 2019-08-16 DIAGNOSIS — M48.02 CERVICAL SPINAL STENOSIS: ICD-10-CM

## 2019-08-16 DIAGNOSIS — M54.12 CERVICAL RADICULOPATHY: ICD-10-CM

## 2019-08-16 PROCEDURE — 97110 THERAPEUTIC EXERCISES: CPT

## 2019-08-16 PROCEDURE — 97162 PT EVAL MOD COMPLEX 30 MIN: CPT

## 2019-08-16 ASSESSMENT — ENCOUNTER SYMPTOMS
QUALITY: DULL ACHE
PAIN SCALE AT HIGHEST: 4
PAIN SCALE AT LOWEST: 0
PAIN SCALE: 0

## 2019-08-16 NOTE — OP THERAPY EVALUATION
"  Outpatient Physical Therapy  INITIAL EVALUATION    [address]  [address]  [place] [address]  Phone:  [phone]  Fax:  [phone]    Date of Evaluation: 08/16/2019    Patient: Tyler Lvie  YOB: 1955  MRN: [id number]     Referring Provider: Mert Onofre M.D.  [address]  [address]  [place] [address]   Referring Diagnosis Cervical radiculopathy [M54.12];Cervical disc herniation [M50.20];Cervical spinal stenosis [M48.02];Osteoarthritis of cervical spine, unspecified spinal osteoarthritis complication status [M47.812];Greater trochanteric bursitis of right hip [M70.61];Bilateral hip joint arthritis [M16.0]     Time Calculation  Start time: 0900  Stop time: 0950 Time Calculation (min): 50 minutes       Physical Therapy Occurrence Codes    Date of onset of impairment:  7/8/19   Date physical therapy care plan established or reviewed:  8/16/19   Date physical therapy treatment started:  8/16/19          Chief Complaint: Neck Problem    Visit Diagnoses     ICD-10-CM   1. Cervical radiculopathy M54.12   2. Cervical disc herniation M50.20   3. Cervical spinal stenosis M48.02   4. Osteoarthritis of cervical spine, unspecified spinal osteoarthritis complication status M47.812   5. Greater trochanteric bursitis of right hip M70.61   6. Bilateral hip joint arthritis M16.0         Subjective:   History of Present Illness:     Mechanism of injury:  Pt \"\" states he was having neck pain and was in here a year ago and saw Vivi. PT was not really helping so had an MRI done which showed compression of the disc and stenosis in the cervical spine. He then saw Dr. Onofre who did an injection which has helped with the pain, but still has pain with cx extension. He has been focusing on keeping his head down more with walking and does not get the pain as bad.     When the pain flares up, it is in the neck and goes into the R shoulder. It feels like " strong dull ache and can get pretty intense. The only way to relieve it is by laying down and getting the neck out of the position. Ice packs help.     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    Since the injection, things are better, but it has been about 1.5 months and the pain is slowly starting to creep back.     Pt has no issues of heart issues, pacemakers, diabetes, history of cancer. Pt has no allergies to note.  Sleep disturbance: gets about 4, never been able to sleep very long, not waking because of the pain.  Pain:     Current pain ratin    At best pain ratin    At worst pain ratin (pt stops once it reaches 3-4)    Quality:  Dull ache  Patient Goals:     Patient goals for therapy:  Increased strength, increased motion and decreased pain    Other patient goals:  Proper posture, pain relief      No past medical history on file.  Past Surgical History:   Procedure Laterality Date   • CATARACT EXTRACTION WITH IOL Bilateral    • COLONOSCOPY  age 50    neg   • EYE SURGERY  age 4    R eye,trauma     Social History     Tobacco Use   • Smoking status: Never Smoker   • Smokeless tobacco: Never Used   Substance Use Topics   • Alcohol use: Yes     Comment: rare     Family and Occupational History     Socioeconomic History   • Marital status:      Spouse name: Not on file   • Number of children: Not on file   • Years of education: Not on file   • Highest education level: Not on file   Occupational History   • Not on file       Objective     Postural Observations  Seated posture: fair  Standing posture: fair  Correction of posture: makes symptoms better    Additional Postural Observation Details  Forward head - increased cx lordosis, increased upper cx extension    Shoulder Screen    Shoulder active range of motion within functional limits.  Shoulder strength within  "functional limits.    Neurological Testing     Reflexes   Left   Biceps (C5/C6): normal (2+)  Brachioradialis (C6): normal (2+)    Right   Biceps (C5/C6): normal (2+)  Brachioradialis (C6): normal (2+)    Myotome testing   Cervical (left)   C5 (deltoid): 5  C6 (biceps): 5  C7 (triceps): 5  C8 (thumb extension): 5  T1 (intrinsics): 5    Cervical (right)   C5 (deltoid): 5  C7 (triceps): 5  C8 (thumb extension): 5  T1 (intrinsics): 5    Palpation   Left   No palpable tenderness to the cervical paraspinals, longus colli, scalenes and upper trapezius.     Right   No palpable tenderness to the cervical paraspinals, longus colli and scalenes.   Tenderness of the upper trapezius.     Additional Palpation Details  Slight TTP of R UT, no TTP of remaining B UTs    Active Range of Motion     Cervical Spine   Flexion: within functional limits  Extension: decreased (limited by >25%)  Retraction: decreased  Left lateral flexion: decreased (limited by >75%)  Right lateral flexion: decreased (limited by >75%)  Left rotation: decreased (39 deg)  Right rotation: decreased (48 deg)    Joint Play   Spine     Central PA [place]        C0-1: hypomobile       C2: hypomobile       C3: hypomobile       C4: WFL       C5: WFL       C6: hypomobile       C7: hypomobile       T1: hypomobile    Additional joint play details:   Global stiffness of cx spine, especially of upper cx spine (C1-3) and lower cx spine (C5-7)         Tests     Left Shoulder   Negative Spurling's sign.     Right Shoulder   Negative Spurling's sign.         Therapeutic Exercises (CPT 75375):     1. Chin tuck supine, 5\" x 10 x 1    2. Scap retract (w/ chin tuck) - \"get tall\", 5\" x 10 x 1    3. Scap + Er, pink, 10 x 1    10. UPOC: 10/11/19      Time-based treatments/modalities:  Therapeutic exercise minutes (CPT 77149): 10 minutes       Assessment, Response and Plan:   Impairments: abnormal muscle firing, abnormal or restricted ROM, activity intolerance, difficulty performing " job, limited ADL's and pain with function    Assessment details:  Pt is a pleasant, cooperative 65 yo male who presents with R neck pain with referral into RUE. Pt has s/s consistent with cx stenosis with radicular pain into RUE. Pt has increased pain with prolonged cx extension which is relieved with flexion, poor posture, and increased global cx stiffness making it difficult to walk, paint, or do any activity that requires looking up for long periods of time. Pt will benefit from skilled physical therapy in order to strengthen his scap and DNF, improve his posture and postural awareness, improve his cx ROM, and decrease his overall pain in order to return to ADLs and recreational activities with less pain and restriction.    Pt's dizziness and possible vestibular symptoms were not assessed as pt did not want his symptoms flared up for the rest of the day. Pt would likely benefit from more of a vestibular assessment in a later session given his history of BPPV and complaints of lingering dizziness with position change.  Barriers to therapy:  Age  Other barriers to therapy:  Chronicity of pain  Prognosis: good    Goals:   Short Term Goals:   1. Pt is to be able to walk for 30 min without increase in neck pain  2. Pt is to hold himself in improved posture 50% more of the time without cueing  3. Pt is to perform HEP without cueing demonstrating compliance  Short term goal time span:  2-4 weeks      Long Term Goals:    1. Pt is to have 55 deg into B cx rotation ROM for improved ability to check blind spots  2. Pt is to be able to walk for 1 hour without increase in neck pain  3. Pt is to be able to paint for 30 min before needing to rest   Long term goal time span:  6-8 weeks    Plan:   Therapy options:  Physical therapy treatment to continue  Planned therapy interventions:  E Stim Unattended (CPT 27741), Gait Training (CPT 64807), Manual Therapy (CPT 00613), Mechanical Traction (CPT 49082), Neuromuscular Re-education  (CPT 55797), Canalith Repositioning (CPT 51725), Therapeutic Activities (CPT 70871) and Therapeutic Exercise (CPT 65170)  Frequency:  2x week  Duration in weeks:  8  Plan details:  Pt to start with PT for 2x/week and will then decrease to 1x/week once HEP is established and pt is making improvements between visits.        Functional Assessment Used  Neck Disability Total: 6     Referring provider co-signature:  I have reviewed this plan of care and my co-signature certifies the need for services.  Certification Dates:   From 08/16/19   To 10/11/19    Physician Signature: ________________________________ Date: ______________

## 2019-08-17 ENCOUNTER — HOSPITAL ENCOUNTER (EMERGENCY)
Facility: MEDICAL CENTER | Age: 64
End: 2019-08-17
Attending: EMERGENCY MEDICINE
Payer: COMMERCIAL

## 2019-08-17 VITALS
SYSTOLIC BLOOD PRESSURE: 141 MMHG | RESPIRATION RATE: 16 BRPM | DIASTOLIC BLOOD PRESSURE: 99 MMHG | TEMPERATURE: 97.9 F | HEART RATE: 65 BPM | WEIGHT: 229.72 LBS | BODY MASS INDEX: 27.12 KG/M2 | HEIGHT: 77 IN | OXYGEN SATURATION: 97 %

## 2019-08-17 DIAGNOSIS — H10.32 ACUTE CONJUNCTIVITIS OF LEFT EYE, UNSPECIFIED ACUTE CONJUNCTIVITIS TYPE: ICD-10-CM

## 2019-08-17 DIAGNOSIS — S05.02XA ABRASION OF LEFT CORNEA, INITIAL ENCOUNTER: ICD-10-CM

## 2019-08-17 DIAGNOSIS — H57.12 LEFT EYE PAIN: ICD-10-CM

## 2019-08-17 PROCEDURE — 99284 EMERGENCY DEPT VISIT MOD MDM: CPT

## 2019-08-17 PROCEDURE — 700101 HCHG RX REV CODE 250: Performed by: EMERGENCY MEDICINE

## 2019-08-17 RX ORDER — MOXIFLOXACIN 5 MG/ML
1 SOLUTION/ DROPS OPHTHALMIC 4 TIMES DAILY
Qty: 1 BOTTLE | Refills: 0 | Status: SHIPPED | OUTPATIENT
Start: 2019-08-17 | End: 2020-01-10

## 2019-08-17 RX ORDER — PREDNISOLONE ACETATE 10 MG/ML
1 SUSPENSION/ DROPS OPHTHALMIC ONCE
Status: COMPLETED | OUTPATIENT
Start: 2019-08-17 | End: 2019-08-17

## 2019-08-17 RX ORDER — PREDNISOLONE ACETATE 10 MG/ML
1 SUSPENSION/ DROPS OPHTHALMIC 4 TIMES DAILY
Qty: 1 BOTTLE | Refills: 0 | Status: SHIPPED | OUTPATIENT
Start: 2019-08-17 | End: 2020-01-10

## 2019-08-17 RX ORDER — PROPARACAINE HYDROCHLORIDE 5 MG/ML
1 SOLUTION/ DROPS OPHTHALMIC ONCE
Status: COMPLETED | OUTPATIENT
Start: 2019-08-17 | End: 2019-08-17

## 2019-08-17 RX ADMIN — PREDNISOLONE ACETATE 1 DROP: 10 SUSPENSION/ DROPS OPHTHALMIC at 23:45

## 2019-08-17 RX ADMIN — FLUORESCEIN SODIUM 1 MG: 1 STRIP OPHTHALMIC at 22:00

## 2019-08-17 RX ADMIN — PROPARACAINE HYDROCHLORIDE 1 DROP: 5 SOLUTION/ DROPS OPHTHALMIC at 22:00

## 2019-08-17 SDOH — HEALTH STABILITY: MENTAL HEALTH: HOW OFTEN DO YOU HAVE A DRINK CONTAINING ALCOHOL?: MONTHLY OR LESS

## 2019-08-17 ASSESSMENT — PAIN DESCRIPTION - DESCRIPTORS: DESCRIPTORS: SHARP

## 2019-08-18 NOTE — ED PROVIDER NOTES
"ED Provider Note    Scribed for Michelet Malin M.D. by Jose Antonio Quevedo. 8/17/2019, 9:33 PM.    Primary care provider: ROLAND Mullins  Means of arrival: Walk-in  History obtained from: Patient  History limited by: None    CHIEF COMPLAINT  Chief Complaint   Patient presents with   • Eye Pain     left        HPI  Tyler Live is a 64 y.o. male who presents to the Emergency Department for evaluation of left eye pain onset 6 days ago. He describes his pain as \"sharp and burning\". The patient describes feeling like a foreign body is stuck in the lower part of his eye. The patient affirms additional symptoms of \"eye tearing\" and blurry vision.The patient denies doing any unusual activity at the time of onset but states it is possible hair spray got into his eye. The patient states his symptoms were mild at onset but progressively worsened. The patient was evaluated at an urgent care 6 days ago and was diagnosed with a corneal abrasion and placed in a bandage. The patient states his pain was significantly relieved when his eye was bandaged. The patient was prescribed antibiotic eye drops and azithromycin ointment. The bandage was removed two days ago. After the bandage was removed, the patient states his pain progressively worsened. The patient presented to the [facility] opthalmology clinic and was diagnosed with a corneal laceration by Dr Sol. Dr Sol instructed the patient to discontinue the antibiotic drops and ointment he was prescribed. The patient does not wear contact lenses. The patient denies any discharge, facial rashes, right eye pain, or fever. The patient has a surgical history that includes cataract extraction.     REVIEW OF SYSTEMS  Pertinent positives include left eye pain, left \"eye tearing\" and blurry vision. Pertinent negatives include discharge, facial rashes, right eye pain, or fever.     PAST MEDICAL HISTORY  The patient has no chronic medical history.     SURGICAL HISTORY   has a " "past surgical history that includes eye surgery (age 4); colonoscopy (age 50); and cataract extraction with iol (Bilateral).    SOCIAL HISTORY  None noted.     FAMILY HISTORY  Family History   Problem Relation Age of Onset   • Cancer Father         melanoma   • GI Disease Mother    • Cancer Sister         breast   • Cancer Maternal Grandmother         kidney   • Cancer Maternal Grandfather         prostate   • Cancer Paternal Grandmother         liver   • Cancer Paternal Grandfather         throat   • Diabetes Neg Hx    • Heart Disease Neg Hx    • Stroke Neg Hx        CURRENT MEDICATIONS  Current Outpatient Medications:   •  erythromycin 5 MG/GM Ointment, Place 0.5 Inches in left eye 4 times a day for 5 days., Disp: 1 Tube, Rfl: 0    ALLERGIES  No Known Allergies    PHYSICAL EXAM  VITAL SIGNS: /100   Pulse 70   Temp 36.6 °C (97.9 °F) (Temporal)   Resp 16   Ht 1.956 m (6' 5\")   Wt 104.2 kg (229 lb 11.5 oz)   SpO2 97%   BMI 27.24 kg/m²     Constitutional: Well developed, Well nourished, Mild distress.   HENT: Normocephalic, Atraumatic, Oropharynx moist, No oral exudates.   Eyes: Pressure was 23. Diffuse slight stippling dye uptake of fluorescein in the 2 O'clock to 3 O'clock position. No obvious laceration. In the 3 O'clock position there is a 5 mm by 3 mm scleral edema on slit lamp exam. No flare or cell. EOMI. No hyphema. Clear discharge from the left eye.  Neck: Supple, No stridor.  Cardiovascular: Normal heart rate.  Pulmonary:  No respiratory distress.  Chest: No chest wall deformity.    Musculoskeletal: No major deformities noted.  Skin: Warm, Dry, No erythema, No rash on face.   Neurologic: Alert & oriented x 3, Normal motor function,  No focal deficits noted.   Psychiatric: Affect normal, Judgment normal, Mood normal.     COURSE & MEDICAL DECISION MAKING  Nursing notes, VS, PMSFHx reviewed in chart.    9:33 PM - Patient seen and examined at bedside. Performed eye exam at bedside. Informed patient " I did not see any signs of corneal laceration. Patient will be treated with 1 Drop .5% Opthaine and 1 mg Fluorescein ophthalmic strip.     10:24 PM - Paged Opthalmology.    11:04 PM - Spoke with Dr. Dr Sol, Opthamology, about the patient's condition. Dr Sol requests the patient follow up in his office at 10 AM on Monday and to have the patient call his office if symptoms worsen tomorrow.    11:20 PM - I reevaluated the patient at bedside. I discussed my consult with Dr Sol and updated the patient on his recommendations. The patient verbalizes they feel comfortable going home. The patient is stable for discharge at this time and will return for any new or worsening symptoms. I discussed plan for discharge and follow up as outlined below. Patient verbalizes understanding and support with my plan for discharge.     Medical Decision Making: At this point time I do not see any signs the corneal ulceration.  Patient does have some dry eye pattern or inflammation to the cornea in the left upper quadrant.  As well as some scleral edema.  We will treat the patient with steroids as recommended by Dr. Sol as well as antibiotics.  Patient will follow-up with ophthalmology in 2 days or in 1 day if worsening.  Do not see any dendritic lesions or rash on the face to suspect a herpetic infection.    DISPOSITION:  Patient will be discharged home in stable condition.    FOLLOW UP:  Aki Sol M.D.  [address]  [facility] [address]  [phone]    In 2 days  He wants to see you at 10 am monday. Call his office tomorrow if eye pain worse.      OUTPATIENT MEDICATIONS:  Discharge Medication List as of 8/17/2019 11:38 PM      START taking these medications    Details   moxifloxacin (VIGAMOX) 0.5 % Solution Place 1 Drop in left eye 4 times a day., Disp-1 Bottle, R-0, Print Rx Paper      prednisoLONE acetate (PRED FORTE) 1 % Suspension Place 1 Drop in both eyes 4 times a day., Disp-1 Bottle, R-0, Print Rx Paper              Parent was given return precautions and verbalizes understanding. Parent will return with patient for new or worsening symptoms.     FINAL IMPRESSION  1. Left eye pain    2. Acute conjunctivitis of left eye, unspecified acute conjunctivitis type    3. Abrasion of left cornea, initial encounter          Jose Antonio LALA (Aly), am scribing for, and in the presence of, Michelet Malin M.D.    Electronically signed by: Jose Antonio Quevedo (Aly), 8/17/2019    Michelet LALA M.D. personally performed the services described in this documentation, as scribed by Jose Antonio Quevedo in my presence, and it is both accurate and complete.    The note accurately reflects work and decisions made by me.  Michelet Malin  8/18/2019  3:25 AM

## 2019-08-18 NOTE — ED NOTES
Patient awake alert and oriented x 4, [address], bed in low position, call light within reach, on room air, unlabored breathing noted, no cough noted, interacts with staff, interactions noted as appropriate.    Dr. Malin at bedside performing eye exam at this time.

## 2019-08-18 NOTE — ED NOTES
Patient awake alert and oriented x 4, [address], bed in low position, call light within reach, on room air, unlabored breathing noted, no cough noted, interacts with staff, interactions noted as appropriate, wife at bedside.

## 2019-08-18 NOTE — ED NOTES
Patient verbalized understanding of discharge instructions, provided with discharge paperwork, gait steady, ambulated independently to DEBORAH matute.

## 2019-08-18 NOTE — ED TRIAGE NOTES
"Pt ambulatory to triage c/o increased \"sharp/ burning\" pain to left eye today. Pt reports he was diagnosed with a corneal abrasion 6 days ago by Dr. Sol. Pt reports he was told by an ophthalmologist at [facility] today he may have a corneal laceration. Pt denies injury. Educated on triage process, encourage to inform staff of any changes.   "

## 2019-08-19 ENCOUNTER — HOSPITAL ENCOUNTER (OUTPATIENT)
Dept: RADIOLOGY | Facility: MEDICAL CENTER | Age: 64
End: 2019-08-19
Attending: NURSE PRACTITIONER
Payer: COMMERCIAL

## 2019-08-19 DIAGNOSIS — R06.02 SOB (SHORTNESS OF BREATH) ON EXERTION: ICD-10-CM

## 2019-08-19 PROCEDURE — A9502 TC99M TETROFOSMIN: HCPCS

## 2019-08-20 ENCOUNTER — PHYSICAL THERAPY (OUTPATIENT)
Dept: PHYSICAL THERAPY | Facility: REHABILITATION | Age: 64
End: 2019-08-20
Attending: PHYSICAL MEDICINE & REHABILITATION
Payer: COMMERCIAL

## 2019-08-20 DIAGNOSIS — M54.12 CERVICAL RADICULOPATHY: ICD-10-CM

## 2019-08-20 DIAGNOSIS — M48.02 CERVICAL SPINAL STENOSIS: ICD-10-CM

## 2019-08-20 PROCEDURE — 97012 MECHANICAL TRACTION THERAPY: CPT

## 2019-08-20 ASSESSMENT — ENCOUNTER SYMPTOMS
PAIN SCALE AT LOWEST: 0
PAIN SCALE: 0
PAIN SCALE AT HIGHEST: 4
QUALITY: DULL ACHE

## 2019-08-20 NOTE — OP THERAPY DAILY TREATMENT
"  Outpatient Physical Therapy  INITIAL EVALUATION    [facility] Physical Therapy [address].  [address]  [place] [address]  Phone:  [phone]  Fax:  [phone]    Date of Evaluation: 08/20/2019    Patient: Tyler Live  YOB: 1955  MRN: [id number]     Referring Provider: Mert Onofre M.D.  [address]  [address]  [place] [address]   Referring Diagnosis Cervical radiculopathy [M54.12];Cervical disc herniation [M50.20];Cervical spinal stenosis [M48.02];Osteoarthritis of cervical spine, unspecified spinal osteoarthritis complication status [M47.812];Greater trochanteric bursitis of right hip [M70.61];Bilateral hip joint arthritis [M16.0]     Time Calculation               Physical Therapy Occurrence Codes    Date of onset of impairment:  7/8/19   Date physical therapy care plan established or reviewed:  8/16/19   Date physical therapy treatment started:  8/16/19          Chief Complaint: No chief complaint on file.    {No diagnosis found. (Refresh or delete this SmartLink)}      Subjective:   History of Present Illness:     Mechanism of injury:  Pt \"\" states he was having neck pain and was in here a year ago and saw Vivi. PT was not really helping so had an MRI done which showed compression of the disc and stenosis in the cervical spine. He then saw Dr. Onofre who did an injection which has helped with the pain, but still has pain with cx extension. He has been focusing on keeping his head down more with walking and does not get the pain as bad.     When the pain flares up, it is in the neck and goes into the R shoulder. It feels like strong dull ache and can get pretty intense. The only way to relieve it is by laying down and getting the neck out of the position. Ice packs help.     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    Since " the injection, things are better, but it has been about 1.5 months and the pain is slowly starting to creep back.     Pt has no issues of heart issues, pacemakers, diabetes, history of cancer. Pt has no allergies to note.  Sleep disturbance: gets about 4, never been able to sleep very long, not waking because of the pain.  Pain:     Current pain ratin    At best pain ratin    At worst pain ratin (pt stops once it reaches 3-4)    Quality:  Dull ache  Patient Goals:     Patient goals for therapy:  Increased strength, increased motion and decreased pain    Other patient goals:  Proper posture, pain relief      No past medical history on file.  Past Surgical History:   Procedure Laterality Date   • CATARACT EXTRACTION WITH IOL Bilateral    • COLONOSCOPY  age 50    neg   • EYE SURGERY  age 4    R eye,trauma     Social History     Tobacco Use   • Smoking status: Never Smoker   • Smokeless tobacco: Never Used   Substance Use Topics   • Alcohol use: Not Currently     Frequency: Monthly or less     Comment: rare     Family and Occupational History     Socioeconomic History   • Marital status:      Spouse name: Not on file   • Number of children: Not on file   • Years of education: Not on file   • Highest education level: Not on file   Occupational History   • Not on file       Objective     Postural Observations  Seated posture: fair  Standing posture: fair  Correction of posture: makes symptoms better    Additional Postural Observation Details  Forward head - increased cx lordosis, increased upper cx extension    Shoulder Screen    Shoulder active range of motion within functional limits.  Shoulder strength within functional limits.    Neurological Testing     Reflexes   Left   Biceps (C5/C6): normal (2+)  Brachioradialis (C6): normal (2+)    Right   Biceps (C5/C6): normal (2+)  Brachioradialis (C6): normal (2+)    Myotome testing   Cervical (left)   C5 (deltoid): 5  C6 (biceps): 5  C7 (triceps): 5  C8  "(thumb extension): 5  T1 (intrinsics): 5    Cervical (right)   C5 (deltoid): 5  C7 (triceps): 5  C8 (thumb extension): 5  T1 (intrinsics): 5    Palpation   Left   No palpable tenderness to the cervical paraspinals, longus colli, scalenes and upper trapezius.     Right   No palpable tenderness to the cervical paraspinals, longus colli and scalenes.   Tenderness of the upper trapezius.     Additional Palpation Details  Slight TTP of R UT, no TTP of remaining B UTs    Active Range of Motion     Cervical Spine   Flexion: within functional limits  Extension: decreased (limited by >25%)  Retraction: decreased  Left lateral flexion: decreased (limited by >75%)  Right lateral flexion: decreased (limited by >75%)  Left rotation: decreased (39 deg)  Right rotation: decreased (48 deg)    Joint Play   Spine     Central PA [place]        C0-1: hypomobile       C2: hypomobile       C3: hypomobile       C4: WFL       C5: WFL       C6: hypomobile       C7: hypomobile       T1: hypomobile    Additional joint play details:   Global stiffness of cx spine, especially of upper cx spine (C1-3) and lower cx spine (C5-7)         Tests     Left Shoulder   Negative Spurling's sign.     Right Shoulder   Negative Spurling's sign.         Therapeutic Exercises (CPT 47173):     1. Chin tuck supine, 5\" x 10 x 1    2. Scap retract (w/ chin tuck) - \"get tall\", 5\" x 10 x 1    3. Scap + Er, pink, 10 x 1    10. UPOC: 10/11/19      Time-based treatments/modalities:          Assessment, Response and Plan:   Impairments: abnormal muscle firing, abnormal or restricted ROM, activity intolerance, difficulty performing job, limited ADL's and pain with function    Assessment details:  Pt is a pleasant, cooperative 65 yo male who presents with R neck pain with referral into RUE. Pt has s/s consistent with cx stenosis with radicular pain into RUE. Pt has increased pain with prolonged cx extension which is relieved with flexion, poor posture, and increased global " cx stiffness making it difficult to walk, paint, or do any activity that requires looking up for long periods of time. Pt will benefit from skilled physical therapy in order to strengthen his scap and DNF, improve his posture and postural awareness, improve his cx ROM, and decrease his overall pain in order to return to ADLs and recreational activities with less pain and restriction.    Pt's dizziness and possible vestibular symptoms were not assessed as pt did not want his symptoms flared up for the rest of the day. Pt would likely benefit from more of a vestibular assessment in a later session given his history of BPPV and complaints of lingering dizziness with position change.  Barriers to therapy:  Age  Other barriers to therapy:  Chronicity of pain  Prognosis: good    Goals:   Short Term Goals:   1. Pt is to be able to walk for 30 min without increase in neck pain  2. Pt is to hold himself in improved posture 50% more of the time without cueing  3. Pt is to perform HEP without cueing demonstrating compliance  Short term goal time span:  2-4 weeks      Long Term Goals:    1. Pt is to have 55 deg into B cx rotation ROM for improved ability to check blind spots  2. Pt is to be able to walk for 1 hour without increase in neck pain  3. Pt is to be able to paint for 30 min before needing to rest   Long term goal time span:  6-8 weeks    Plan:   Therapy options:  Physical therapy treatment to continue  Planned therapy interventions:  E Stim Unattended (CPT 09355), Gait Training (CPT 63283), Manual Therapy (CPT 35202), Mechanical Traction (CPT 13483), Neuromuscular Re-education (CPT 64934), Canalith Repositioning (CPT 05708), Therapeutic Activities (CPT 58745) and Therapeutic Exercise (CPT 46546)  Frequency:  2x week  Duration in weeks:  8  Plan details:  Pt to start with PT for 2x/week and will then decrease to 1x/week once HEP is established and pt is making improvements between visits.        Functional Assessment  Used        Referring provider co-signature:  I have reviewed this plan of care and my co-signature certifies the need for services.  Certification Dates:   From 08/16/19   To 10/15/19    Physician Signature: ________________________________ Date: ______________

## 2019-08-20 NOTE — OP THERAPY DAILY TREATMENT
"  Outpatient Physical Therapy  DAILY TREATMENT     [facility] Physical [address].  [address]  [place] [address]  Phone:  [phone]  Fax:  [phone]    Date: 08/20/2019    Patient: Tyler Live  YOB: 1955  MRN: [id number]     Time Calculation  Start time: 1400  Stop time: 1445 Time Calculation (min): 45 minutes       Chief Complaint: Neck Problem    Visit #: 2    SUBJECTIVE:  No radicular symptoms. Mild pain/stiffness neck.     OBJECTIVE:  Current objective measures:           Therapeutic Exercises (CPT 01658):     1. Seated chin tucks, 5\" x 10 x 1    2. Seated scap depression/adduction, 5\" x 10 x 1    3. Blue FR, self-thoracic mob, extension AROM    4. Standing rows, green 15 x 2    5. Standing bilateral shoulder er, green 15 x 2    10. UPOC: 10/11/19      Therapeutic Exercise Summary: [id number]   Patient Portal:  [email or web address]       Therapeutic Treatments and Modalities:     1. Mechanical Traction (CPT 39325), Cervical traction #18/10 60/20 w/mhp 15'    Time-based treatments/modalities:  Therapeutic exercise minutes (CPT 61724): 28 minutes         ASSESSMENT:   Response to treatment Thoracic hypomobility and muscle length restrictions contribute to forward head position at rest. Improved with cueing for the HEP.     PLAN/RECOMMENDATIONS:   Plan for treatment: therapy treatment to continue next visit.  Planned interventions for next visit: continue with current treatment.       "

## 2019-08-22 ENCOUNTER — PHYSICAL THERAPY (OUTPATIENT)
Dept: PHYSICAL THERAPY | Facility: REHABILITATION | Age: 64
End: 2019-08-22
Attending: PHYSICAL MEDICINE & REHABILITATION
Payer: COMMERCIAL

## 2019-08-22 DIAGNOSIS — M54.12 CERVICAL RADICULOPATHY: ICD-10-CM

## 2019-08-22 DIAGNOSIS — M48.02 CERVICAL SPINAL STENOSIS: ICD-10-CM

## 2019-08-22 DIAGNOSIS — M50.20 CERVICAL DISC HERNIATION: ICD-10-CM

## 2019-08-22 PROCEDURE — 97110 THERAPEUTIC EXERCISES: CPT

## 2019-08-22 PROCEDURE — 97140 MANUAL THERAPY 1/> REGIONS: CPT

## 2019-08-22 PROCEDURE — 97012 MECHANICAL TRACTION THERAPY: CPT

## 2019-08-22 NOTE — OP THERAPY DAILY TREATMENT
Outpatient Physical Therapy  DAILY TREATMENT     [facility] Physical [address].  [address]  [place] [address]  Phone:  [phone]  Fax:  [phone]    Date: 08/22/2019    Patient: Tyler Live  YOB: 1955  MRN: [id number]     Time Calculation  Start time: 1500  Stop time: 1545 Time Calculation (min): 45 minutes       Chief Complaint: Neck Problem    Visit #: 3    SUBJECTIVE: no neck pain at rest or radiating symptoms. Pain and dizziness with looking up. Has restarted using pulleys at gym with no neck pain.       OBJECTIVE:  Current objective measures:           Therapeutic Exercises (CPT 59137):     3. Blue FR, self-thoracic mob, extension AROM, scapular adduction/depression 10 with 5 second hold, alternating GH flexion 10 x 2, angels 10 x 1, box flexion orange band 10 x 1, difficulty maintaing ER to neutral    8. Self-suboccital release with tennis balls, 3 min    9. Seated thoracic extension/AROM at chair, 2 min    10. UPOC: 10/11/19      Therapeutic Exercise Summary: [id number]   Patient Portal:  [email or web address]       Therapeutic Treatments and Modalities:     1. Mechanical Traction (CPT 92463), Cervical traction #18/10 60/20 w/mhp 15'    2. Manual Therapy (CPT 79816), Rib screws T3-9 grade III-IV, suboccipital release     Time-based treatments/modalities:  Manual therapy minutes (CPT 14752): 9 minutes  Therapeutic exercise minutes (CPT 59316): 18 minutes       Patient education: role of cervical retraction and periscapular strength/endurance exercises in preparing for increased UE load at gym.     ASSESSMENT:   Response to treatment: Poor serratus engagement. Improving motor control with stability exercises on roller.     PLAN/RECOMMENDATIONS:   Plan for treatment: therapy treatment to continue next visit.  Planned interventions for next visit: continue with current treatment. Progress HEP - serratus band. Chin tuck with OP. Manual.

## 2019-08-27 ENCOUNTER — APPOINTMENT (OUTPATIENT)
Dept: PHYSICAL THERAPY | Facility: REHABILITATION | Age: 64
End: 2019-08-27
Attending: PHYSICAL MEDICINE & REHABILITATION
Payer: COMMERCIAL

## 2019-08-29 ENCOUNTER — PHYSICAL THERAPY (OUTPATIENT)
Dept: PHYSICAL THERAPY | Facility: REHABILITATION | Age: 64
End: 2019-08-29
Attending: PHYSICAL MEDICINE & REHABILITATION
Payer: COMMERCIAL

## 2019-08-29 DIAGNOSIS — M48.02 CERVICAL SPINAL STENOSIS: ICD-10-CM

## 2019-08-29 DIAGNOSIS — M54.12 CERVICAL RADICULOPATHY: ICD-10-CM

## 2019-08-29 PROCEDURE — 97012 MECHANICAL TRACTION THERAPY: CPT

## 2019-08-29 NOTE — OP THERAPY DAILY TREATMENT
Outpatient Physical Therapy  DAILY TREATMENT     [facility] Physical [address].  [address]  [place] [address]  Phone:  [phone]  Fax:  [phone]    Date: 08/29/2019    Patient: Tyler Live  YOB: 1955  MRN: [id number]     Time Calculation  Start time: 1530  Stop time: 1615 Time Calculation (min): 45 minutes       Chief Complaint: Neck Problem    Visit #: 4    SUBJECTIVE:  Pain with walking after jogging this morning. No longer radicular. Localized to R side neck.     OBJECTIVE:  Current objective measures:     Progress HEP - serratus band. Chin tuck with OP. Manual.           Therapeutic Exercises (CPT 99230):     1. UBE L3 1 min foward/backward x 2    2. Seated cervical retraction with OP , 20 x 2    3. Blue FR, self-thoracic mob, extension AROM, scapular adduction/depression 10 with 5 second hold, alternating GH flexion 10 x 2, box flexion orange band 10 x 1    10. UPOC: 10/11/19      Therapeutic Exercise Summary: [id number]   Patient Portal:  [email or web address]       Therapeutic Treatments and Modalities:     1. Mechanical Traction (CPT 64594), Cervical traction #18/10 60/20 w/mhp 15'    2. Manual Therapy (CPT 54315), Rib screws T3-9 grade III-IV, suboccipital release     Time-based treatments/modalities:  Manual therapy minutes (CPT 86528): 9 minutes  Therapeutic exercise minutes (CPT 98330): 18 minutes           ASSESSMENT:   Response to treatment: Tolerating extension progression well, with centralization of symptoms. Serratus engagement improving.     PLAN/RECOMMENDATIONS:   Plan for treatment: therapy treatment to continue next visit.  Planned interventions for next visit: continue with current treatment. Progress HEP serratus exercise. Rows/pull downs on airex

## 2019-09-03 ENCOUNTER — PHYSICAL THERAPY (OUTPATIENT)
Dept: PHYSICAL THERAPY | Facility: REHABILITATION | Age: 64
End: 2019-09-03
Attending: PHYSICAL MEDICINE & REHABILITATION
Payer: COMMERCIAL

## 2019-09-03 DIAGNOSIS — M48.02 CERVICAL SPINAL STENOSIS: ICD-10-CM

## 2019-09-03 DIAGNOSIS — M54.12 CERVICAL RADICULOPATHY: ICD-10-CM

## 2019-09-03 PROCEDURE — 97530 THERAPEUTIC ACTIVITIES: CPT

## 2019-09-03 NOTE — OP THERAPY DAILY TREATMENT
"  Outpatient Physical Therapy  DAILY TREATMENT     [facility] Physical Therapy [address].  [address]  [place] [address]  Phone:  [phone]  Fax:  [phone]    Date: 09/03/2019    Patient: Tyler Live  YOB: 1955  MRN: [id number]     Time Calculation  Start time: 1400  Stop time: 1445 Time Calculation (min): 45 minutes       Chief Complaint: No chief complaint on file.    Visit #: 5    SUBJECTIVE:  No further exacerbation of symptoms (feels about the same) with working on putting in new shelving in kitchen this week. In the past, this would have wiped him out and required laying down to tolerate symptoms. Had the same localized neck pain as at last visit, but no worse. \"I really noticed the difference\".      OBJECTIVE:  Current objective measures:              Therapeutic Exercises (CPT 46142):     1. UBE L3 1 min foward/backward x 2    2. Seated cervical retraction with OP , 20 x 2, with extension 20 x 2    4. Rows with green band, 20 x 2, Progressed in HEP    5. Bilateral shoulder ER with green band, 20 x 2, progressed in HEP    7. Standing, box flexion orange band 10 x 1    10. UPOC: 10/11/19      Therapeutic Exercise Summary: [id number]   Patient Portal:  [email or web address]       Therapeutic Treatments and Modalities:     1. Mechanical Traction (CPT 63723), Cervical traction #20/10 60/20 w/mhp 15'    Time-based treatments/modalities:  Therapeutic activity minutes (CPT 70282): 28 minutes           ASSESSMENT:   Response to treatment: Continues to tolerate extension progression well, with centralization of symptoms. Serratus engagement improving.     PLAN/RECOMMENDATIONS:   Plan for treatment: therapy treatment to continue next visit.  Planned interventions for next visit: continue with current treatment. Progress HEP serratus exercise. Rows/pull downs on airex  Rib screws T3-9 grade III-IV, suboccipital release        "

## 2019-09-05 ENCOUNTER — PHYSICAL THERAPY (OUTPATIENT)
Dept: PHYSICAL THERAPY | Facility: REHABILITATION | Age: 64
End: 2019-09-05
Attending: PHYSICAL MEDICINE & REHABILITATION
Payer: COMMERCIAL

## 2019-09-05 DIAGNOSIS — M48.02 CERVICAL SPINAL STENOSIS: ICD-10-CM

## 2019-09-05 DIAGNOSIS — M54.12 CERVICAL RADICULOPATHY: ICD-10-CM

## 2019-09-05 PROCEDURE — 97140 MANUAL THERAPY 1/> REGIONS: CPT

## 2019-09-05 PROCEDURE — 97110 THERAPEUTIC EXERCISES: CPT

## 2019-09-05 NOTE — OP THERAPY DAILY TREATMENT
Outpatient Physical Therapy  DAILY TREATMENT     [facility] Physical [address].  [address]  [place] [address]  Phone:  [phone]  Fax:  [phone]    Date: 09/05/2019    Patient: Tyler Live  YOB: 1955  MRN: [id number]     Time Calculation  Start time: 1500  Stop time: 1530 Time Calculation (min): 30 minutes       Chief Complaint: Neck Problem    Visit #: 6    SUBJECTIVE:  Minimal neck pain since last visit.     OBJECTIVE:  Current objective measures:              Therapeutic Exercises (CPT 70176):     1. FR, thoracic self mobs, alternating shoulder flexion, angels    4. Rows with green band, 20 x 2, Progressed in HEP    5. Bilateral shoulder ER with green band, 20 x 2, progressed in HEP    7. On FR, box flexion orange band 10 x 1    10. UPOC: 10/11/19      Therapeutic Exercise Summary: [id number]   Patient Portal:  [email or web address]       Therapeutic Treatments and Modalities:     1. Manual Therapy (CPT 15375), Rib screws T3-9 grade III-IV    Time-based treatments/modalities:  Manual therapy minutes (CPT 38306): 8 minutes  Therapeutic exercise minutes (CPT 00381): 20 minutes           ASSESSMENT:   Response to treatment: Progressing well in PT.  return for follow up after two weeks. Likely discharge in 1-2 visits.     PLAN/RECOMMENDATIONS:   Plan for treatment: therapy treatment to continue next visit.  Planned interventions for next visit: continue with current treatment.

## 2019-09-23 ENCOUNTER — APPOINTMENT (RX ONLY)
Dept: URBAN - METROPOLITAN AREA CLINIC 20 | Facility: CLINIC | Age: 64
Setting detail: DERMATOLOGY
End: 2019-09-23

## 2019-09-23 DIAGNOSIS — L81.4 OTHER MELANIN HYPERPIGMENTATION: ICD-10-CM

## 2019-09-23 DIAGNOSIS — D18.0 HEMANGIOMA: ICD-10-CM

## 2019-09-23 DIAGNOSIS — L82.0 INFLAMED SEBORRHEIC KERATOSIS: ICD-10-CM

## 2019-09-23 DIAGNOSIS — L57.0 ACTINIC KERATOSIS: ICD-10-CM

## 2019-09-23 DIAGNOSIS — Z85.828 PERSONAL HISTORY OF OTHER MALIGNANT NEOPLASM OF SKIN: ICD-10-CM

## 2019-09-23 DIAGNOSIS — D22 MELANOCYTIC NEVI: ICD-10-CM

## 2019-09-23 DIAGNOSIS — Z71.89 OTHER SPECIFIED COUNSELING: ICD-10-CM

## 2019-09-23 DIAGNOSIS — L20.89 OTHER ATOPIC DERMATITIS: ICD-10-CM

## 2019-09-23 DIAGNOSIS — L82.1 OTHER SEBORRHEIC KERATOSIS: ICD-10-CM

## 2019-09-23 PROBLEM — D22.71 MELANOCYTIC NEVI OF RIGHT LOWER LIMB, INCLUDING HIP: Status: ACTIVE | Noted: 2019-09-23

## 2019-09-23 PROBLEM — D22.5 MELANOCYTIC NEVI OF TRUNK: Status: ACTIVE | Noted: 2019-09-23

## 2019-09-23 PROBLEM — D22.61 MELANOCYTIC NEVI OF RIGHT UPPER LIMB, INCLUDING SHOULDER: Status: ACTIVE | Noted: 2019-09-23

## 2019-09-23 PROBLEM — D22.62 MELANOCYTIC NEVI OF LEFT UPPER LIMB, INCLUDING SHOULDER: Status: ACTIVE | Noted: 2019-09-23

## 2019-09-23 PROBLEM — D18.01 HEMANGIOMA OF SKIN AND SUBCUTANEOUS TISSUE: Status: ACTIVE | Noted: 2019-09-23

## 2019-09-23 PROBLEM — D22.39 MELANOCYTIC NEVI OF OTHER PARTS OF FACE: Status: ACTIVE | Noted: 2019-09-23

## 2019-09-23 PROBLEM — L20.84 INTRINSIC (ALLERGIC) ECZEMA: Status: ACTIVE | Noted: 2019-09-23

## 2019-09-23 PROBLEM — D22.72 MELANOCYTIC NEVI OF LEFT LOWER LIMB, INCLUDING HIP: Status: ACTIVE | Noted: 2019-09-23

## 2019-09-23 PROCEDURE — ? LIQUID NITROGEN

## 2019-09-23 PROCEDURE — 99214 OFFICE O/P EST MOD 30 MIN: CPT | Mod: 25

## 2019-09-23 PROCEDURE — ? PRESCRIPTION

## 2019-09-23 PROCEDURE — ? COUNSELING: TOPICAL STEROIDS

## 2019-09-23 PROCEDURE — ? OBSERVATION

## 2019-09-23 PROCEDURE — ? SUNSCREEN RECOMMENDATIONS

## 2019-09-23 PROCEDURE — 17003 DESTRUCT PREMALG LES 2-14: CPT

## 2019-09-23 PROCEDURE — ? COUNSELING

## 2019-09-23 PROCEDURE — 17000 DESTRUCT PREMALG LESION: CPT

## 2019-09-23 RX ORDER — CLOBETASOL PROPIONATE 0.5 MG/G
CREAM TOPICAL
Qty: 1 | Refills: 1 | Status: ERX

## 2019-09-23 ASSESSMENT — LOCATION SIMPLE DESCRIPTION DERM
LOCATION SIMPLE: UPPER BACK
LOCATION SIMPLE: RIGHT FOREARM
LOCATION SIMPLE: RIGHT ELBOW
LOCATION SIMPLE: RIGHT THIGH
LOCATION SIMPLE: RIGHT CHEEK
LOCATION SIMPLE: ANTERIOR SCALP
LOCATION SIMPLE: LEFT POSTERIOR THIGH
LOCATION SIMPLE: SCALP
LOCATION SIMPLE: LEFT FOREARM
LOCATION SIMPLE: POSTERIOR SCALP
LOCATION SIMPLE: RIGHT POSTERIOR UPPER ARM
LOCATION SIMPLE: LEFT ELBOW
LOCATION SIMPLE: RIGHT LOWER BACK
LOCATION SIMPLE: RIGHT POSTERIOR THIGH
LOCATION SIMPLE: LEFT PRETIBIAL REGION
LOCATION SIMPLE: LEFT THIGH
LOCATION SIMPLE: RIGHT FOREHEAD
LOCATION SIMPLE: LEFT POSTERIOR UPPER ARM
LOCATION SIMPLE: RIGHT PRETIBIAL REGION
LOCATION SIMPLE: RIGHT UPPER BACK

## 2019-09-23 ASSESSMENT — LOCATION DETAILED DESCRIPTION DERM
LOCATION DETAILED: POSTERIOR MID-PARIETAL SCALP
LOCATION DETAILED: LEFT LATERAL PROXIMAL PRETIBIAL REGION
LOCATION DETAILED: RIGHT DISTAL POSTERIOR UPPER ARM
LOCATION DETAILED: RIGHT VENTRAL PROXIMAL FOREARM
LOCATION DETAILED: RIGHT INFERIOR CENTRAL MALAR CHEEK
LOCATION DETAILED: LEFT ELBOW
LOCATION DETAILED: LEFT PROXIMAL POSTERIOR UPPER ARM
LOCATION DETAILED: RIGHT INFERIOR MEDIAL UPPER BACK
LOCATION DETAILED: LEFT SUPERIOR PARIETAL SCALP
LOCATION DETAILED: RIGHT SUPERIOR UPPER BACK
LOCATION DETAILED: RIGHT PROXIMAL PRETIBIAL REGION
LOCATION DETAILED: RIGHT ANTERIOR DISTAL THIGH
LOCATION DETAILED: RIGHT ELBOW
LOCATION DETAILED: RIGHT INFERIOR FOREHEAD
LOCATION DETAILED: RIGHT DISTAL POSTERIOR THIGH
LOCATION DETAILED: MID-FRONTAL SCALP
LOCATION DETAILED: LEFT DISTAL POSTERIOR THIGH
LOCATION DETAILED: LEFT VENTRAL PROXIMAL FOREARM
LOCATION DETAILED: INFERIOR THORACIC SPINE
LOCATION DETAILED: SUPERIOR THORACIC SPINE
LOCATION DETAILED: LEFT ANTERIOR DISTAL THIGH
LOCATION DETAILED: RIGHT INFERIOR MEDIAL MIDBACK

## 2019-09-23 ASSESSMENT — LOCATION ZONE DERM
LOCATION ZONE: FACE
LOCATION ZONE: LEG
LOCATION ZONE: SCALP
LOCATION ZONE: ARM
LOCATION ZONE: TRUNK

## 2019-09-23 NOTE — PROCEDURE: LIQUID NITROGEN
Duration Of Freeze Thaw-Cycle (Seconds): 10
Post-Care Instructions: I reviewed with the patient in detail post-care instructions. Patient is to wear sunprotection, and avoid picking at any of the treated lesions. Pt may apply Vaseline to crusted or scabbing areas.
Render Note In Bullet Format When Appropriate: No
Consent: The patient's consent was obtained including but not limited to risks of crusting, scabbing, blistering, scarring, darker or lighter pigmentary change, recurrence, incomplete removal and infection. RTC in 2 months if lesion(s) persistent.
Render Post-Care Instructions In Note?: yes
Detail Level: Detailed
Number Of Freeze-Thaw Cycles: 2 freeze-thaw cycles

## 2019-09-26 ENCOUNTER — PHYSICAL THERAPY (OUTPATIENT)
Dept: PHYSICAL THERAPY | Facility: REHABILITATION | Age: 64
End: 2019-09-26
Attending: PHYSICAL MEDICINE & REHABILITATION
Payer: COMMERCIAL

## 2019-09-26 DIAGNOSIS — M50.20 CERVICAL DISC HERNIATION: ICD-10-CM

## 2019-09-26 DIAGNOSIS — M48.02 CERVICAL SPINAL STENOSIS: ICD-10-CM

## 2019-09-26 PROCEDURE — 97110 THERAPEUTIC EXERCISES: CPT

## 2019-09-26 NOTE — OP THERAPY DAILY TREATMENT
Outpatient Physical Therapy  DAILY TREATMENT     [facility] Physical [address].  [address]  [place] [address]  Phone:  [phone]  Fax:  [phone]    Date: 09/26/2019    Patient: Tyler Live  YOB: 1955  MRN: [id number]     Time Calculation  Start time: 1402  Stop time: 1435 Time Calculation (min): 33 minutes       Chief Complaint: Neck Problem    Visit #: 7    SUBJECTIVE:  Feeling great and doing home projects and work tasks with no neck pain. Mild pain when doing flys/reverse flys with 120# at gym but the pain went away once he stopped. Feels ready to discharge.     OBJECTIVE:  Current objective measures:      Neck Disability Total: 3       Therapeutic Exercises (CPT 18052):     3. Review    4. Rows with green band, 20 x 2    5. Bilateral shoulder ER with green band, 20 x 2    6. Patient education; Body mechanics on gym machines, continuing HEP long term, recommend vestibular PT evaluation in future       Therapeutic Exercise Summary: [id number]   Patient Portal:  [email or web address]         Time-based treatments/modalities:  Therapeutic exercise minutes (CPT 52976): 20 minutes           ASSESSMENT:   Response to treatment: Patient has met all PT goals and is ready for discharge.     PLAN/RECOMMENDATIONS:   Plan for treatment: discharge patient due to accomplished goals.

## 2019-09-26 NOTE — OP THERAPY DISCHARGE SUMMARY
Outpatient Physical Therapy  DISCHARGE SUMMARY NOTE      [facility] Physical Therapy [address].  [address]  [place] [address]  Phone:  [phone]  Fax:  [phone]    Date of Visit: 09/26/2019    Patient: Tyler Live  YOB: 1955  MRN: [id number]     Referring Provider: Mert Onofre M.D.  [address]  [address]  [place] [address]   Referring Diagnosis Radiculopathy, cervical region [M54.12];Other cervical disc displacement, unspecified cervical region [M50.20];Spinal stenosis, cervical region [M48.02];Spondylosis without myelopathy or radiculopathy, cervical region [M47.812];Trochanteric bursitis, right hip [M70.61];Bilateral primary osteoarthritis of hip [M16.0]     Physical Therapy Occurrence Codes    Date of onset of impairment:  7/8/19   Date physical therapy care plan established or reviewed:  8/16/19   Date physical therapy treatment started:  8/16/19          Functional Assessment Used  Neck Disability Total: 3     Your patient is being discharged from Physical Therapy with the following comments:   · Goals met    Comments:  The patient is asymptomatic with daily tasks including work, ADLs, IADLs, driving. Ready for discharge.    Limitations Remaining:  Mild pain with increased loading in gym lifting program. Discussed/practiced body mechanics for these tasks today.     Recommendations:  Continue with HEP interdependently.    Aminah Yanez, PT, DPT    Date: 9/26/2019

## 2019-10-03 ENCOUNTER — APPOINTMENT (OUTPATIENT)
Dept: PHYSICAL THERAPY | Facility: REHABILITATION | Age: 64
End: 2019-10-03
Attending: PHYSICAL MEDICINE & REHABILITATION
Payer: COMMERCIAL

## 2019-10-09 ENCOUNTER — APPOINTMENT (OUTPATIENT)
Dept: PHYSICAL THERAPY | Facility: REHABILITATION | Age: 64
End: 2019-10-09
Attending: PHYSICAL MEDICINE & REHABILITATION
Payer: COMMERCIAL

## 2019-10-17 ENCOUNTER — APPOINTMENT (OUTPATIENT)
Dept: PHYSICAL THERAPY | Facility: REHABILITATION | Age: 64
End: 2019-10-17
Attending: PHYSICAL MEDICINE & REHABILITATION
Payer: COMMERCIAL

## 2019-11-05 ENCOUNTER — OFFICE VISIT (OUTPATIENT)
Dept: PHYSICAL MEDICINE AND REHAB | Facility: MEDICAL CENTER | Age: 64
End: 2019-11-05
Payer: COMMERCIAL

## 2019-11-05 VITALS
HEART RATE: 60 BPM | OXYGEN SATURATION: 100 % | BODY MASS INDEX: 27.44 KG/M2 | DIASTOLIC BLOOD PRESSURE: 70 MMHG | TEMPERATURE: 97.3 F | SYSTOLIC BLOOD PRESSURE: 118 MMHG | WEIGHT: 232.37 LBS | HEIGHT: 77 IN

## 2019-11-05 DIAGNOSIS — M50.20 CERVICAL DISC HERNIATION: ICD-10-CM

## 2019-11-05 DIAGNOSIS — M47.812 OSTEOARTHRITIS OF CERVICAL SPINE, UNSPECIFIED SPINAL OSTEOARTHRITIS COMPLICATION STATUS: ICD-10-CM

## 2019-11-05 DIAGNOSIS — M25.652 DECREASED RANGE OF MOTION OF BOTH HIPS: ICD-10-CM

## 2019-11-05 DIAGNOSIS — M16.0 BILATERAL HIP JOINT ARTHRITIS: ICD-10-CM

## 2019-11-05 DIAGNOSIS — M25.651 DECREASED RANGE OF MOTION OF BOTH HIPS: ICD-10-CM

## 2019-11-05 DIAGNOSIS — M70.61 GREATER TROCHANTERIC BURSITIS OF RIGHT HIP: ICD-10-CM

## 2019-11-05 DIAGNOSIS — H81.09 MENIERE'S DISEASE, UNSPECIFIED LATERALITY: ICD-10-CM

## 2019-11-05 DIAGNOSIS — M48.02 CERVICAL SPINAL STENOSIS: ICD-10-CM

## 2019-11-05 DIAGNOSIS — M54.12 CERVICAL RADICULOPATHY: ICD-10-CM

## 2019-11-05 PROCEDURE — 99214 OFFICE O/P EST MOD 30 MIN: CPT | Performed by: PHYSICAL MEDICINE & REHABILITATION

## 2019-11-05 ASSESSMENT — PATIENT HEALTH QUESTIONNAIRE - PHQ9
8. MOVING OR SPEAKING SO SLOWLY THAT OTHER PEOPLE COULD HAVE NOTICED. OR THE OPPOSITE, BEING SO FIGETY OR RESTLESS THAT YOU HAVE BEEN MOVING AROUND A LOT MORE THAN USUAL: 0
4. FEELING TIRED OR HAVING LITTLE ENERGY: 1
6. FEELING BAD ABOUT YOURSELF - OR THAT YOU ARE A FAILURE OR HAVE LET YOURSELF OR YOUR FAMILY DOWN: 0
7. TROUBLE CONCENTRATING ON THINGS, SUCH AS READING THE NEWSPAPER OR WATCHING TELEVISION: 0
2. FEELING DOWN, DEPRESSED, IRRITABLE, OR HOPELESS: 0
5. POOR APPETITE OR OVEREATING: 0
SUM OF ALL RESPONSES TO PHQ QUESTIONS 1-9: 4
9. THOUGHTS THAT YOU WOULD BE BETTER OFF DEAD, OR OF HURTING YOURSELF: 0
3. TROUBLE FALLING OR STAYING ASLEEP OR SLEEPING TOO MUCH: 3
SUM OF ALL RESPONSES TO PHQ9 QUESTIONS 1 AND 2: 0
1. LITTLE INTEREST OR PLEASURE IN DOING THINGS: 0

## 2019-11-05 ASSESSMENT — PAIN SCALES - GENERAL: PAINLEVEL: 2=MINIMAL-SLIGHT

## 2019-11-05 NOTE — PROGRESS NOTES
Follow up patient note  Interventional spine and sports physiatry, Physical medicine rehabilitation      Chief complaint:   Chief Complaint   Patient presents with   • Follow-Up     Neck pain          HISTORY    Please see new patient note dated  by Dr Onofre,  for more details.     HPI  Patient identification: Tyler Live 63 y.o. male  With Diagnoses of Cervical radiculopathy, Cervical disc herniation, Cervical spinal stenosis, Osteoarthritis of cervical spine, unspecified spinal osteoarthritis complication status, Greater trochanteric bursitis of right hip, Bilateral hip joint arthritis, Decreased range of motion of both hips, and Meniere's disease, unspecified laterality were pertinent to this visit.       Patient has had a significant improvement after the cervical epidural and with physical therapy.  He reports a 90% improvement overall.  No longer has radicular component to his pain.  He does have intermittent aching pain worse in the cervical spine worse with cervical extension.  He is currently remodeling his house so this is required intermittently.  He does have flares of pain during this activity.  Not able to tolerate being on a road bike.  He is able to resume his activities including walking, recumbent bike and stairmaster and is having no difficulty with his exercise program.    Hip pain is stable. Intermittent pain. Typically aching, nonradiating, low to moderate grade. No improvement in ROM. Worse with sleeping. Previously worse with playing golf but not playing anymore. Worse with long hikes but he is not doing these. Intermittent lateral hip pain.        ROS Red Flags :   Fever, Chills, Sweats: Denies  Involuntary Weight Loss: Denies  Bowel/Bladder Incontinence: Denies  Saddle Anesthesia: Denies        PMHx:   History reviewed. No pertinent past medical history.    PSHx:   Past Surgical History:   Procedure Laterality Date   • CATARACT EXTRACTION WITH IOL Bilateral    • COLONOSCOPY  age  50    neg   • EYE SURGERY  age 4    R eye,trauma       Family history   Family History   Problem Relation Age of Onset   • Cancer Father         melanoma   • GI Disease Mother    • Cancer Sister         breast   • Cancer Maternal Grandmother         kidney   • Cancer Maternal Grandfather         prostate   • Cancer Paternal Grandmother         liver   • Cancer Paternal Grandfather         throat   • Diabetes Neg Hx    • Heart Disease Neg Hx    • Stroke Neg Hx          Medications:   No outpatient medications have been marked as taking for the 11/5/19 encounter (Office Visit) with Mert Onofre M.D..        Current Outpatient Medications on File Prior to Visit   Medication Sig Dispense Refill   • moxifloxacin (VIGAMOX) 0.5 % Solution Place 1 Drop in left eye 4 times a day. 1 Bottle 0   • prednisoLONE acetate (PRED FORTE) 1 % Suspension Place 1 Drop in both eyes 4 times a day. 1 Bottle 0     No current facility-administered medications on file prior to visit.          Allergies:   No Known Allergies    Social Hx:   Social History     Socioeconomic History   • Marital status:      Spouse name: Not on file   • Number of children: Not on file   • Years of education: Not on file   • Highest education level: Not on file   Occupational History   • Not on file   Social Needs   • Financial resource strain: Not on file   • Food insecurity:     Worry: Not on file     Inability: Not on file   • Transportation needs:     Medical: Not on file     Non-medical: Not on file   Tobacco Use   • Smoking status: Never Smoker   • Smokeless tobacco: Never Used   Substance and Sexual Activity   • Alcohol use: Not Currently     Frequency: Monthly or less     Comment: rare   • Drug use: No   • Sexual activity: Yes     Partners: Female     Comment: ,  [employer]   Lifestyle   • Physical activity:     Days per week: Not on file     Minutes per session: Not on file   • Stress: Not on file   Relationships   • Social  "connections:     Talks on phone: Not on file     Gets together: Not on file     Attends Congregation service: Not on file     Active member of club or organization: Not on file     Attends meetings of clubs or organizations: Not on file     Relationship status: Not on file   • Intimate partner violence:     Fear of current or ex partner: Not on file     Emotionally abused: Not on file     Physically abused: Not on file     Forced sexual activity: Not on file   Other Topics Concern   •  Service No   • Blood Transfusions No   • Caffeine Concern No   • Occupational Exposure No   • Hobby Hazards No   • Sleep Concern Yes   • Stress Concern No   • Weight Concern No   • Special Diet No   • Back Care No   • Exercise Yes   • Bike Helmet No   • Seat Belt Yes   • Self-Exams Yes   Social History Narrative   • Not on file         EXAMINATION     Physical Exam:   Vitals: /70 (BP Location: Left arm, Patient Position: Sitting, BP Cuff Size: Adult)   Pulse 60   Temp 36.3 °C (97.3 °F) (Temporal)   Ht 1.956 m (6' 5\")   Wt 105.4 kg (232 lb 5.8 oz)   SpO2 100%     Constitutional:   Body Habitus: Body mass index is 27.55 kg/m².  Cooperation: Fully cooperates with exam  Appearance: Well-groomed no disheveled    Respiratory-  breathing comfortable on room air, no audible wheezing  Cardiovascular- capillary refills less than 2 seconds. No lower extremity edema is noted.   Psychiatric- alert and oriented ×3. Normal affect.    MSK: -    Decreased range of motion of the bilateral hips.  FAIRs maneuver positive.    Near full range of motion of the cervical spine.  Spurling's maneuver negative. Strength 5/5 BL       MEDICAL DECISION MAKING    DATA    Labs:   Lab Results   Component Value Date/Time    SODIUM 142 08/02/2019 06:06 AM    SODIUM 142 09/03/2008 03:38 PM    POTASSIUM 4.7 08/02/2019 06:06 AM    POTASSIUM 3.9 09/03/2008 03:38 PM    CHLORIDE 102 08/02/2019 06:06 AM    CHLORIDE 110 09/03/2008 03:38 PM    CO2 25 08/02/2019 " 06:06 AM    CO2 28 09/03/2008 03:38 PM    GLUCOSE 97 08/02/2019 06:06 AM    GLUCOSE 90 09/03/2008 03:38 PM    BUN 11 08/02/2019 06:06 AM    BUN 15 09/03/2008 03:38 PM    CREATININE 1.09 08/02/2019 06:06 AM    CREATININE 1.2 09/03/2008 03:38 PM    BUNCREATRAT 10 08/02/2019 06:06 AM        Lab Results   Component Value Date/Time    PROTHROMBTM 12.0 09/03/2008 03:38 PM    INR 1.03 09/03/2008 03:38 PM        Lab Results   Component Value Date/Time    WBC 7.2 09/03/2008 03:38 PM    RBC 4.39 (L) 09/03/2008 03:38 PM    HEMOGLOBIN 14.5 09/03/2008 03:38 PM    HEMATOCRIT 41.3 (L) 09/03/2008 03:38 PM    MCV 94.1 09/03/2008 03:38 PM    MCH 33.0 09/03/2008 03:38 PM    MCHC 35.1 (H) 09/03/2008 03:38 PM    MPV 8.1 09/03/2008 03:38 PM        No results found for: HBA1C     IMAGING  I reviewed the following studies and these are my reads.  MRI cervical spine 5/14/2019  Moderate central canal stenosis  C5-6, mild central canal stenosis C4-5  And C4-5 and this is worse at C5-6.  Facet arthropathy worse right worse than left C3-4, C4-5, C5-6.  C5-6 moderate to severe neuroforaminal stenosis.  Severe neuroforaminal stenosis C5-6 bilaterally..    I reviewed the following studies and these are the radiologist read  X-ray cervical spine 5/1/2019  Impression       1.  No compression deformity or acute fracture identified.    2.  There is degenerative disc disease and facet arthropathy.           MRI cervical spine 5/14/2019  FINDINGS:  The cervical spine maintains normal height and alignment. There is no fracture or dislocation. There is no pathologic marrow infiltration.    At the level of C2-3, there is no spinal or neural foraminal stenosis.    At the level of C3-4, there is disc degeneration. There is broad-based central disc protrusion. There is effacement of the ventral subarachnoid space. There is mild central canal stenosis.    At the level of C4-5, there is disc degeneration. There are combinations of diffuse disc bulge and  ligamentum flavum hypertrophy causing moderate central canal stenosis. Bilateral uncinate joint arthropathy is seen. There is mild bilateral neural   foraminal stenosis.    At the level of C5-6, there is disc degeneration. Posterior lateral osteophytes and disc bulge are seen. There is moderate to severe central canal and moderate bilateral neural foraminal stenosis.    At the level of C6-7, there is disc degeneration. Mild diffuse disc bulge is seen. There is mild effacement of the ventral subarachnoid space.    At the level of C7-T1, there is mild diffuse disc bulge without significant spinal or neural foraminal stenosis.    At the level of T3-4, there is minimal disc bulge without significant spinal or neural foraminal stenosis.    The visualized posterior fossa structures appear normal within limits.  The cervical spinal cord does not demonstrate any mass or abnormal T2 signal intensity.    Megacisterna magna is seen.    The visualized pre-and paraspinal soft tissues appear normal within limits.   Impression       Multifocal degenerative disease in the cervical spine as described above.               DIAGNOSIS   Visit Diagnoses     ICD-10-CM   1. Cervical radiculopathy M54.12   2. Cervical disc herniation M50.20   3. Cervical spinal stenosis M48.02   4. Osteoarthritis of cervical spine, unspecified spinal osteoarthritis complication status M47.812   5. Greater trochanteric bursitis of right hip M70.61   6. Bilateral hip joint arthritis M16.0   7. Decreased range of motion of both hips M25.651    M25.652   8. Meniere's disease, unspecified laterality H81.09         ASSESSMENT and PLAN:     Tyler Live 63 y.o. kieran Scott was seen today for follow-up.    Diagnoses and all orders for this visit:    Cervical radiculopathy  Comments:  stable, continue home exercise program  Orders:  -     REFERRAL TO PHYSICAL THERAPY Reason for Therapy: Eval/Treat/Report    Cervical disc herniation  Comments:  stable,  continue home exercise program  Orders:  -     REFERRAL TO PHYSICAL THERAPY Reason for Therapy: Eval/Treat/Report    Cervical spinal stenosis  Comments:  stable, continue home exercise program  Orders:  -     REFERRAL TO PHYSICAL THERAPY Reason for Therapy: Eval/Treat/Report    Osteoarthritis of cervical spine, unspecified spinal osteoarthritis complication status  Comments:  stable, continue home exercise program  Orders:  -     REFERRAL TO PHYSICAL THERAPY Reason for Therapy: Eval/Treat/Report    Greater trochanteric bursitis of right hip  Comments:  with intermittent flares. we discussed PT  Orders:  -     REFERRAL TO PHYSICAL THERAPY Reason for Therapy: Eval/Treat/Report    Bilateral hip joint arthritis  Comments:  stable, monitor for now  Orders:  -     REFERRAL TO PHYSICAL THERAPY Reason for Therapy: Eval/Treat/Report    Decreased range of motion of both hips  Comments:  stable  Orders:  -     REFERRAL TO PHYSICAL THERAPY Reason for Therapy: Eval/Treat/Report    Meniere's disease, unspecified laterality  -     REFERRAL TO PHYSICAL THERAPY Reason for Therapy: Eval/Treat/Report           Follow up: after PT    Thank you for allowing me to participate in the care of this patient. If you have any questions please not hesitate to contact me.          Please note that this dictation was created using voice recognition software. I have made every reasonable attempt to correct obvious errors but there may be errors of grammar and content that I may have overlooked prior to finalization of this note.      Mert Onofer MD  Interventional Spine and Sports Physiatry  Physical Medicine and Rehabilitation  [facility]

## 2019-11-05 NOTE — Clinical Note
Dear Abdirahman Farias, A.P.N. , Thank you for the referral of Tyler Live. Overall he is doing quite well and is exercising again.  Please see my note for more details Should you have any questions or concerns please do not hesitate to contact me. Mert Onofre M.D.

## 2020-01-10 ENCOUNTER — OFFICE VISIT (OUTPATIENT)
Dept: MEDICAL GROUP | Facility: MEDICAL CENTER | Age: 65
End: 2020-01-10
Payer: COMMERCIAL

## 2020-01-10 VITALS
DIASTOLIC BLOOD PRESSURE: 70 MMHG | OXYGEN SATURATION: 96 % | SYSTOLIC BLOOD PRESSURE: 114 MMHG | BODY MASS INDEX: 28.74 KG/M2 | RESPIRATION RATE: 16 BRPM | HEART RATE: 66 BPM | HEIGHT: 76 IN | WEIGHT: 236 LBS

## 2020-01-10 DIAGNOSIS — E78.5 DYSLIPIDEMIA: ICD-10-CM

## 2020-01-10 DIAGNOSIS — N40.1 BENIGN PROSTATIC HYPERPLASIA WITH INCOMPLETE BLADDER EMPTYING: ICD-10-CM

## 2020-01-10 DIAGNOSIS — K21.9 GASTROESOPHAGEAL REFLUX DISEASE WITHOUT ESOPHAGITIS: ICD-10-CM

## 2020-01-10 DIAGNOSIS — R39.14 BENIGN PROSTATIC HYPERPLASIA WITH INCOMPLETE BLADDER EMPTYING: ICD-10-CM

## 2020-01-10 PROCEDURE — 99214 OFFICE O/P EST MOD 30 MIN: CPT | Performed by: NURSE PRACTITIONER

## 2020-01-10 RX ORDER — TAMSULOSIN HYDROCHLORIDE 0.4 MG/1
0.4 CAPSULE ORAL
Qty: 30 CAP | Refills: 5 | Status: SHIPPED | OUTPATIENT
Start: 2020-01-10 | End: 2020-06-05

## 2020-01-10 RX ORDER — OMEPRAZOLE 40 MG/1
40 CAPSULE, DELAYED RELEASE ORAL DAILY
Qty: 30 CAP | Refills: 5 | Status: SHIPPED | OUTPATIENT
Start: 2020-01-10 | End: 2020-06-05

## 2020-01-10 RX ORDER — ATORVASTATIN CALCIUM 20 MG/1
20 TABLET, FILM COATED ORAL EVERY EVENING
Qty: 90 TAB | Refills: 1 | Status: SHIPPED | OUTPATIENT
Start: 2020-01-10 | End: 2020-02-12 | Stop reason: SDUPTHER

## 2020-01-10 ASSESSMENT — ENCOUNTER SYMPTOMS
VOMITING: 0
NAUSEA: 0
HEARTBURN: 1

## 2020-01-10 ASSESSMENT — PATIENT HEALTH QUESTIONNAIRE - PHQ9: CLINICAL INTERPRETATION OF PHQ2 SCORE: 0

## 2020-01-10 NOTE — PROGRESS NOTES
Subjective:      Tyler Live is a 64 y.o. male who presents with Results (labvs ) and Heartburn        CC: Patient is here today for 3 new issues including problems with urination, acid reflux, and elevated cholesterol.    HPI         1. Dyslipidemia  Patient's ASCVD score is coming back elevated at 10.7.  He is not diabetic and has no history of diabetes but does have history of ascending aorta dilatation.  He has never been on a statin    2. Gastroesophageal reflux disease without esophagitis  Patient reports he started having some symptoms of acid reflux about 2 weeks ago and his worst episode was last night.  He states his symptoms only happen at night when he lays down.  He describes it as a discomfort in the midsternal area as well as an acid taste in his mouth.  Symptoms improve when he sits up.  He admits that he does often eat or drink late at night and last night had some orange juice before bed.  He tried some Tums with minimal results.  The pain is not associated with activity and he has no shortness of breath or dizziness.  He does not take NSAIDs and states he does not drink alcohol regularly.  He denies black stools    3. Benign prostatic hyperplasia with incomplete bladder emptying  Patient states he has noticed over the past few months a urgency with urination.  He states sometimes it feels like he will not make it to the bathroom.  He also states his stream has been weaker but there has been no hematuria, flank pain or abdominal pain.  His PSA from August was 2.4 and his TSH, chemistry panel and hepatitis screening were negative.  History reviewed. No pertinent past medical history.  Social History     Socioeconomic History   • Marital status:      Spouse name: Not on file   • Number of children: Not on file   • Years of education: Not on file   • Highest education level: Not on file   Occupational History   • Not on file   Social Needs   • Financial resource strain: Not on file   •  Food insecurity:     Worry: Not on file     Inability: Not on file   • Transportation needs:     Medical: Not on file     Non-medical: Not on file   Tobacco Use   • Smoking status: Never Smoker   • Smokeless tobacco: Never Used   Substance and Sexual Activity   • Alcohol use: Not Currently     Frequency: Monthly or less     Comment: rare   • Drug use: No   • Sexual activity: Yes     Partners: Female     Comment: ,  [employer]   Lifestyle   • Physical activity:     Days per week: Not on file     Minutes per session: Not on file   • Stress: Not on file   Relationships   • Social connections:     Talks on phone: Not on file     Gets together: Not on file     Attends Jew service: Not on file     Active member of club or organization: Not on file     Attends meetings of clubs or organizations: Not on file     Relationship status: Not on file   • Intimate partner violence:     Fear of current or ex partner: Not on file     Emotionally abused: Not on file     Physically abused: Not on file     Forced sexual activity: Not on file   Other Topics Concern   •  Service No   • Blood Transfusions No   • Caffeine Concern No   • Occupational Exposure No   • Hobby Hazards No   • Sleep Concern Yes   • Stress Concern No   • Weight Concern No   • Special Diet No   • Back Care No   • Exercise Yes   • Bike Helmet No   • Seat Belt Yes   • Self-Exams Yes   Social History Narrative   • Not on file     Current Outpatient Medications   Medication Sig Dispense Refill   • tamsulosin (FLOMAX) 0.4 MG capsule Take 1 Cap by mouth ONE-HALF HOUR AFTER DINNER. 30 Cap 5   • omeprazole (PRILOSEC) 40 MG delayed-release capsule Take 1 Cap by mouth every day. 30 Cap 5   • atorvastatin (LIPITOR) 20 MG Tab Take 1 Tab by mouth every evening. 90 Tab 1     No current facility-administered medications for this visit.      Family History   Problem Relation Age of Onset   • Cancer Father         melanoma   • GI Disease Mother    •  "Cancer Sister         breast   • Cancer Maternal Grandmother         kidney   • Cancer Maternal Grandfather         prostate   • Cancer Paternal Grandmother         liver   • Cancer Paternal Grandfather         throat   • Diabetes Neg Hx    • Heart Disease Neg Hx    • Stroke Neg Hx          Review of Systems   Gastrointestinal: Positive for heartburn. Negative for nausea and vomiting.   Genitourinary: Positive for frequency and urgency. Negative for dysuria and hematuria.   All other systems reviewed and are negative.         Objective:     /70 (BP Location: Left arm, Patient Position: Sitting, BP Cuff Size: Adult)   Pulse 66   Resp 16   Ht 1.93 m (6' 4\")   Wt 107 kg (236 lb)   SpO2 96%   BMI 28.73 kg/m²      Physical Exam  Vitals signs and nursing note reviewed.   Constitutional:       General: He is not in acute distress.     Appearance: He is well-developed. He is not diaphoretic.   HENT:      Head: Normocephalic and atraumatic.      Right Ear: External ear normal.      Left Ear: External ear normal.      Nose: Nose normal.   Eyes:      General:         Right eye: No discharge.         Left eye: No discharge.      Conjunctiva/sclera: Conjunctivae normal.   Neck:      Musculoskeletal: Normal range of motion and neck supple.      Thyroid: No thyromegaly.      Trachea: No tracheal deviation.   Cardiovascular:      Rate and Rhythm: Normal rate and regular rhythm.      Heart sounds: Normal heart sounds. No murmur.   Pulmonary:      Effort: Pulmonary effort is normal. No respiratory distress.      Breath sounds: Normal breath sounds. No wheezing or rales.   Abdominal:      General: Bowel sounds are normal. There is no distension.      Palpations: Abdomen is soft. There is no mass.      Tenderness: There is no tenderness. There is no guarding or rebound.      Hernia: No hernia is present.   Musculoskeletal:      Comments: Patient points to his epigastric area as to where the pain occurs although there is no " tenderness or pain currently.   Lymphadenopathy:      Cervical: No cervical adenopathy.   Skin:     General: Skin is warm and dry.      Findings: No erythema or rash.   Neurological:      Mental Status: He is alert and oriented to person, place, and time.      Coordination: Coordination normal.   Psychiatric:         Behavior: Behavior normal.         Thought Content: Thought content normal.         Judgment: Judgment normal.                 Assessment/Plan:       1. Dyslipidemia  I reviewed with patient his ASCVD score 10.7% and what that entails.  I reviewed with him the side effects to be aware of with a statin and to stop them should he develop myalgias or should he develop elevating blood sugars.  He will wait to start on the medicine for about 2 weeks until he is already taking the omeprazole and Flomax to be sure which medicine is causing problems should they occur.  - atorvastatin (LIPITOR) 20 MG Tab; Take 1 Tab by mouth every evening.  Dispense: 90 Tab; Refill: 1    2. Gastroesophageal reflux disease without esophagitis  Patient will start on omeprazole for 2 to 4 weeks and then wean off it.  I told him if symptoms keep recurring or if they do not improve, then he may need an EGD and a referral to gastroenterology    The patient was instructed in the following lifestyle and dietary changes:    A. Avoid fried, fatty, and spicy foods until better.  B. Avoid alcohol and excessive caffeine intake.  C. Avoid citrus juices, tomato products, and pepper.  D.Eat smaller portions at mealtimes. Consider more frequent smaller meals.  E. Do not eat meals before bedtime.  F. Lose weight.  G. Quit smoking.  H. Avoid medicines like Advil, Aleve, or asperin. Chose Tylenol if possible.  I. Avoid tight clothing or belts.  J.Consider raising the head of the bed with 6 inch blocks.  - omeprazole (PRILOSEC) 40 MG delayed-release capsule; Take 1 Cap by mouth every day.  Dispense: 30 Cap; Refill: 5    3. Benign prostatic  hyperplasia with incomplete bladder emptying  Patient's PSA was normal and I will have him try Flomax and get a urine test done.  I will have him follow-up with urology as well to see if a bladder scan is needed.  I explained that the medicine can cause dizziness so to take it in the evening.  He will try not to drink late at night.  - URINALYSIS,CULTURE IF INDICATED; Future  - tamsulosin (FLOMAX) 0.4 MG capsule; Take 1 Cap by mouth ONE-HALF HOUR AFTER DINNER.  Dispense: 30 Cap; Refill: 5  - REFERRAL TO UROLOGY

## 2020-05-29 ENCOUNTER — APPOINTMENT (RX ONLY)
Dept: URBAN - METROPOLITAN AREA CLINIC 20 | Facility: CLINIC | Age: 65
Setting detail: DERMATOLOGY
End: 2020-05-29

## 2020-05-29 DIAGNOSIS — D22 MELANOCYTIC NEVI: ICD-10-CM

## 2020-05-29 DIAGNOSIS — Z85.828 PERSONAL HISTORY OF OTHER MALIGNANT NEOPLASM OF SKIN: ICD-10-CM

## 2020-05-29 DIAGNOSIS — D18.0 HEMANGIOMA: ICD-10-CM

## 2020-05-29 DIAGNOSIS — Z71.89 OTHER SPECIFIED COUNSELING: ICD-10-CM

## 2020-05-29 DIAGNOSIS — L57.0 ACTINIC KERATOSIS: ICD-10-CM

## 2020-05-29 DIAGNOSIS — L21.8 OTHER SEBORRHEIC DERMATITIS: ICD-10-CM

## 2020-05-29 DIAGNOSIS — L20.89 OTHER ATOPIC DERMATITIS: ICD-10-CM

## 2020-05-29 DIAGNOSIS — L82.0 INFLAMED SEBORRHEIC KERATOSIS: ICD-10-CM

## 2020-05-29 DIAGNOSIS — L82.1 OTHER SEBORRHEIC KERATOSIS: ICD-10-CM

## 2020-05-29 DIAGNOSIS — L81.4 OTHER MELANIN HYPERPIGMENTATION: ICD-10-CM

## 2020-05-29 PROBLEM — D22.62 MELANOCYTIC NEVI OF LEFT UPPER LIMB, INCLUDING SHOULDER: Status: ACTIVE | Noted: 2020-05-29

## 2020-05-29 PROBLEM — D22.61 MELANOCYTIC NEVI OF RIGHT UPPER LIMB, INCLUDING SHOULDER: Status: ACTIVE | Noted: 2020-05-29

## 2020-05-29 PROBLEM — D22.72 MELANOCYTIC NEVI OF LEFT LOWER LIMB, INCLUDING HIP: Status: ACTIVE | Noted: 2020-05-29

## 2020-05-29 PROBLEM — D22.5 MELANOCYTIC NEVI OF TRUNK: Status: ACTIVE | Noted: 2020-05-29

## 2020-05-29 PROBLEM — D18.01 HEMANGIOMA OF SKIN AND SUBCUTANEOUS TISSUE: Status: ACTIVE | Noted: 2020-05-29

## 2020-05-29 PROBLEM — D22.39 MELANOCYTIC NEVI OF OTHER PARTS OF FACE: Status: ACTIVE | Noted: 2020-05-29

## 2020-05-29 PROBLEM — D22.71 MELANOCYTIC NEVI OF RIGHT LOWER LIMB, INCLUDING HIP: Status: ACTIVE | Noted: 2020-05-29

## 2020-05-29 PROBLEM — L20.84 INTRINSIC (ALLERGIC) ECZEMA: Status: ACTIVE | Noted: 2020-05-29

## 2020-05-29 PROCEDURE — ? COUNSELING

## 2020-05-29 PROCEDURE — ? ADDITIONAL NOTES

## 2020-05-29 PROCEDURE — ? LIQUID NITROGEN

## 2020-05-29 PROCEDURE — ? OBSERVATION

## 2020-05-29 PROCEDURE — 17000 DESTRUCT PREMALG LESION: CPT

## 2020-05-29 PROCEDURE — 99214 OFFICE O/P EST MOD 30 MIN: CPT | Mod: 25

## 2020-05-29 PROCEDURE — ? SUNSCREEN RECOMMENDATIONS

## 2020-05-29 ASSESSMENT — LOCATION DETAILED DESCRIPTION DERM
LOCATION DETAILED: RIGHT LATERAL TEMPLE
LOCATION DETAILED: RIGHT INFERIOR MEDIAL UPPER BACK
LOCATION DETAILED: INFERIOR THORACIC SPINE
LOCATION DETAILED: LEFT LATERAL PROXIMAL PRETIBIAL REGION
LOCATION DETAILED: RIGHT PROXIMAL PRETIBIAL REGION
LOCATION DETAILED: RIGHT DISTAL PRETIBIAL REGION
LOCATION DETAILED: LEFT MEDIAL FRONTAL SCALP
LOCATION DETAILED: SUPERIOR THORACIC SPINE
LOCATION DETAILED: RIGHT DISTAL POSTERIOR THIGH
LOCATION DETAILED: RIGHT MEDIAL FRONTAL SCALP
LOCATION DETAILED: LEFT DISTAL POSTERIOR THIGH
LOCATION DETAILED: RIGHT SUPERIOR UPPER BACK
LOCATION DETAILED: RIGHT INFERIOR CENTRAL MALAR CHEEK
LOCATION DETAILED: RIGHT INFERIOR FOREHEAD
LOCATION DETAILED: LEFT PROXIMAL RADIAL DORSAL FOREARM
LOCATION DETAILED: RIGHT VENTRAL PROXIMAL FOREARM
LOCATION DETAILED: RIGHT INFERIOR MEDIAL MIDBACK
LOCATION DETAILED: LEFT VENTRAL PROXIMAL FOREARM
LOCATION DETAILED: LEFT PROXIMAL POSTERIOR UPPER ARM
LOCATION DETAILED: RIGHT DISTAL POSTERIOR UPPER ARM

## 2020-05-29 ASSESSMENT — LOCATION ZONE DERM
LOCATION ZONE: TRUNK
LOCATION ZONE: SCALP
LOCATION ZONE: LEG
LOCATION ZONE: ARM
LOCATION ZONE: FACE

## 2020-05-29 ASSESSMENT — LOCATION SIMPLE DESCRIPTION DERM
LOCATION SIMPLE: RIGHT PRETIBIAL REGION
LOCATION SIMPLE: LEFT SCALP
LOCATION SIMPLE: RIGHT FOREHEAD
LOCATION SIMPLE: RIGHT TEMPLE
LOCATION SIMPLE: UPPER BACK
LOCATION SIMPLE: RIGHT FOREARM
LOCATION SIMPLE: RIGHT POSTERIOR THIGH
LOCATION SIMPLE: LEFT POSTERIOR UPPER ARM
LOCATION SIMPLE: RIGHT LOWER BACK
LOCATION SIMPLE: LEFT PRETIBIAL REGION
LOCATION SIMPLE: RIGHT CHEEK
LOCATION SIMPLE: RIGHT UPPER BACK
LOCATION SIMPLE: RIGHT POSTERIOR UPPER ARM
LOCATION SIMPLE: LEFT POSTERIOR THIGH
LOCATION SIMPLE: LEFT FOREARM
LOCATION SIMPLE: RIGHT SCALP

## 2020-05-29 NOTE — PROCEDURE: LIQUID NITROGEN
Consent: The patient's consent was obtained including but not limited to risks of crusting, scabbing, blistering, scarring, darker or lighter pigmentary change, recurrence, incomplete removal and infection. RTC in 2 months if lesion(s) persistent.
Post-Care Instructions: I reviewed with the patient in detail post-care instructions. Patient is to wear sunprotection, and avoid picking at any of the treated lesions. Pt may apply Vaseline to crusted or scabbing areas.
Render Note In Bullet Format When Appropriate: No
Number Of Freeze-Thaw Cycles: 2 freeze-thaw cycles
Duration Of Freeze Thaw-Cycle (Seconds): 10
Render Post-Care Instructions In Note?: yes
Detail Level: Detailed

## 2020-05-29 NOTE — PROCEDURE: ADDITIONAL NOTES
Detail Level: Detailed
Additional Notes: Includes area of concern noted on intake.
Additional Notes: Pt has Clobetasol at home.

## 2020-06-05 ENCOUNTER — OFFICE VISIT (OUTPATIENT)
Dept: MEDICAL GROUP | Facility: MEDICAL CENTER | Age: 65
End: 2020-06-05
Payer: COMMERCIAL

## 2020-06-05 VITALS
RESPIRATION RATE: 16 BRPM | SYSTOLIC BLOOD PRESSURE: 124 MMHG | DIASTOLIC BLOOD PRESSURE: 68 MMHG | OXYGEN SATURATION: 95 % | WEIGHT: 231 LBS | HEART RATE: 78 BPM | TEMPERATURE: 98.4 F | HEIGHT: 76 IN | BODY MASS INDEX: 28.13 KG/M2

## 2020-06-05 DIAGNOSIS — Z00.00 ROUTINE GENERAL MEDICAL EXAMINATION AT A HEALTH CARE FACILITY: ICD-10-CM

## 2020-06-05 DIAGNOSIS — K21.9 GASTROESOPHAGEAL REFLUX DISEASE WITHOUT ESOPHAGITIS: ICD-10-CM

## 2020-06-05 DIAGNOSIS — D12.5 ADENOMATOUS POLYP OF SIGMOID COLON: ICD-10-CM

## 2020-06-05 DIAGNOSIS — I77.810 ASCENDING AORTA DILATATION (HCC): ICD-10-CM

## 2020-06-05 DIAGNOSIS — M54.12 CERVICAL RADICULOPATHY: ICD-10-CM

## 2020-06-05 PROCEDURE — 99213 OFFICE O/P EST LOW 20 MIN: CPT | Performed by: NURSE PRACTITIONER

## 2020-06-05 RX ORDER — OMEPRAZOLE 20 MG/1
20 CAPSULE, DELAYED RELEASE ORAL DAILY
Qty: 90 CAP | Refills: 3 | Status: SHIPPED | OUTPATIENT
Start: 2020-06-05 | End: 2021-07-10

## 2020-06-05 ASSESSMENT — ENCOUNTER SYMPTOMS
HEARTBURN: 1
NECK PAIN: 1

## 2020-06-05 NOTE — PROGRESS NOTES
Subjective:      Tyler Live is a 64 y.o. male who presents with Orders Needed        CC: Patient here today for follow-up on ascending aortic dilatation, cervical radiculopathy, colon polyps and acid reflux.    HPI       1. Ascending aorta dilatation (HCC)  Patient has shown a sending aorta dilatation at least since 2012 when it showed up on a echocardiogram at 4.1 cm.  His last echocardiogram was done in August 2019 and was mildly increased to 4.4 cm.  He has no hypertension and is on a statin for mild elevation in ASCVD score.  He states he is active jogging and has had no problems with breathing or chest pain.  He had a stress test done within the year which came back showing no evidence of significant jeopardized viable myocardium or prior MI with normal ejection fraction and wall motion.    2. Cervical radiculopathy  Patient had been to physiatry in the past for his back and was also following with physical therapy.  He would like a referral back to physiatry for his neck pain which continues    3. Gastroesophageal reflux disease without esophagitis  Patient currently on omeprazole 40 mg daily and thinks he can now decrease down to 20 mg.  He has been taking the medicine for a while now and feels it is necessary.    4. Adenomatous polyp of sigmoid colon  Patient was wondering about his next colonoscopy and review of his GI note shows he is due for a 5-year follow-up next year.  He denies blood in his stools or black stools    5. Routine general medical examination at a health care facility  Patient will be due for his yearly blood work in August.  History reviewed. No pertinent past medical history.  Social History     Socioeconomic History   • Marital status:      Spouse name: Not on file   • Number of children: Not on file   • Years of education: Not on file   • Highest education level: Not on file   Occupational History   • Not on file   Social Needs   • Financial resource strain: Not on  file   • Food insecurity     Worry: Not on file     Inability: Not on file   • Transportation needs     Medical: Not on file     Non-medical: Not on file   Tobacco Use   • Smoking status: Never Smoker   • Smokeless tobacco: Never Used   Substance and Sexual Activity   • Alcohol use: Not Currently     Frequency: Monthly or less     Comment: rare   • Drug use: No   • Sexual activity: Yes     Partners: Female     Comment: ,  [employer]   Lifestyle   • Physical activity     Days per week: Not on file     Minutes per session: Not on file   • Stress: Not on file   Relationships   • Social connections     Talks on phone: Not on file     Gets together: Not on file     Attends Zoroastrian service: Not on file     Active member of club or organization: Not on file     Attends meetings of clubs or organizations: Not on file     Relationship status: Not on file   • Intimate partner violence     Fear of current or ex partner: Not on file     Emotionally abused: Not on file     Physically abused: Not on file     Forced sexual activity: Not on file   Other Topics Concern   •  Service No   • Blood Transfusions No   • Caffeine Concern No   • Occupational Exposure No   • Hobby Hazards No   • Sleep Concern Yes   • Stress Concern No   • Weight Concern No   • Special Diet No   • Back Care No   • Exercise Yes   • Bike Helmet No   • Seat Belt Yes   • Self-Exams Yes   Social History Narrative   • Not on file     Current Outpatient Medications   Medication Sig Dispense Refill   • omeprazole (PRILOSEC) 20 MG delayed-release capsule Take 1 Cap by mouth every day. 90 Cap 3   • atorvastatin (LIPITOR) 20 MG Tab Take 1 Tab by mouth every evening. 90 Tab 3     No current facility-administered medications for this visit.      Family History   Problem Relation Age of Onset   • Cancer Father         melanoma   • GI Disease Mother    • Cancer Sister         breast   • Cancer Maternal Grandmother         kidney   • Cancer Maternal  "Grandfather         prostate   • Cancer Paternal Grandmother         liver   • Cancer Paternal Grandfather         throat   • Diabetes Neg Hx    • Heart Disease Neg Hx    • Stroke Neg Hx          Review of Systems   Gastrointestinal: Positive for heartburn.   Musculoskeletal: Positive for neck pain.   All other systems reviewed and are negative.         Objective:     /68 (BP Location: Left arm, Patient Position: Sitting, BP Cuff Size: Adult)   Pulse 78   Temp 36.9 °C (98.4 °F) (Temporal)   Resp 16   Ht 1.93 m (6' 4\")   Wt 104.8 kg (231 lb)   SpO2 95%   BMI 28.12 kg/m²      Physical Exam  Vitals signs and nursing note reviewed.   Constitutional:       General: He is not in acute distress.     Appearance: He is well-developed. He is not diaphoretic.   HENT:      Head: Normocephalic and atraumatic.      Right Ear: External ear normal.      Left Ear: External ear normal.      Nose: Nose normal.   Eyes:      General:         Right eye: No discharge.         Left eye: No discharge.      Conjunctiva/sclera: Conjunctivae normal.   Neck:      Musculoskeletal: Normal range of motion and neck supple.      Thyroid: No thyromegaly.      Trachea: No tracheal deviation.   Cardiovascular:      Rate and Rhythm: Normal rate and regular rhythm.      Heart sounds: Normal heart sounds. No murmur.   Pulmonary:      Effort: Pulmonary effort is normal. No respiratory distress.      Breath sounds: Normal breath sounds. No wheezing or rales.   Musculoskeletal:      Cervical back: He exhibits decreased range of motion and pain.   Lymphadenopathy:      Cervical: No cervical adenopathy.   Skin:     General: Skin is warm and dry.      Findings: No erythema or rash.   Neurological:      Mental Status: He is alert and oriented to person, place, and time.      Coordination: Coordination normal.   Psychiatric:         Behavior: Behavior normal.         Thought Content: Thought content normal.         Judgment: Judgment normal.          "        Assessment/Plan:       1. Ascending aorta dilatation (HCC)  Patient's last imaging study showed a slight increase since 2012 to his current 4.4 cm.  I have ordered a repeat ultrasound to check on his size and explained to him the usual process depending on the size of the dilatation.  He is active and running and has had no problems doing so and has no high blood pressure and is on a statin.  - US-AORTA/ILIACS DUPLEX COMPLETE; Future    2. Cervical radiculopathy  Patient would like to go back to his physiatrist for further treatments  - REFERRAL TO PHYSIATRY (PMR)    3. Gastroesophageal reflux disease without esophagitis  I agree the patient should take the lowest dose necessary and I advised him that when he sees his GI group next year, he should mention this to them to see if he needs an EGD.  - omeprazole (PRILOSEC) 20 MG delayed-release capsule; Take 1 Cap by mouth every day.  Dispense: 90 Cap; Refill: 3    4. Adenomatous polyp of sigmoid colon  I provided patient a copy of the letter regarding his need for colonoscopy next year    5. Routine general medical examination at a health care facility  Patient to do lab work in August.  - Comp Metabolic Panel; Future  - Lipid Profile; Future  - CBC WITHOUT DIFFERENTIAL; Future  - PROSTATE SPECIFIC AG SCREENING; Future

## 2020-06-11 ENCOUNTER — OFFICE VISIT (OUTPATIENT)
Dept: PHYSICAL MEDICINE AND REHAB | Facility: MEDICAL CENTER | Age: 65
End: 2020-06-11
Payer: COMMERCIAL

## 2020-06-11 VITALS
HEIGHT: 75 IN | WEIGHT: 233.25 LBS | DIASTOLIC BLOOD PRESSURE: 80 MMHG | BODY MASS INDEX: 29 KG/M2 | TEMPERATURE: 97.7 F | SYSTOLIC BLOOD PRESSURE: 120 MMHG | OXYGEN SATURATION: 96 % | HEART RATE: 63 BPM

## 2020-06-11 DIAGNOSIS — M16.0 BILATERAL HIP JOINT ARTHRITIS: ICD-10-CM

## 2020-06-11 DIAGNOSIS — M48.02 CERVICAL SPINAL STENOSIS: ICD-10-CM

## 2020-06-11 DIAGNOSIS — M70.61 GREATER TROCHANTERIC BURSITIS OF RIGHT HIP: ICD-10-CM

## 2020-06-11 DIAGNOSIS — M47.812 OSTEOARTHRITIS OF CERVICAL SPINE, UNSPECIFIED SPINAL OSTEOARTHRITIS COMPLICATION STATUS: ICD-10-CM

## 2020-06-11 DIAGNOSIS — M50.20 CERVICAL DISC HERNIATION: ICD-10-CM

## 2020-06-11 DIAGNOSIS — M54.12 CERVICAL RADICULOPATHY: ICD-10-CM

## 2020-06-11 PROCEDURE — 99214 OFFICE O/P EST MOD 30 MIN: CPT | Performed by: PHYSICAL MEDICINE & REHABILITATION

## 2020-06-11 RX ORDER — MELOXICAM 15 MG/1
15 TABLET ORAL DAILY
Qty: 30 TAB | Refills: 0 | Status: SHIPPED | OUTPATIENT
Start: 2020-06-11 | End: 2020-07-11

## 2020-06-11 ASSESSMENT — PATIENT HEALTH QUESTIONNAIRE - PHQ9: CLINICAL INTERPRETATION OF PHQ2 SCORE: 0

## 2020-06-11 ASSESSMENT — PAIN SCALES - GENERAL: PAINLEVEL: 4=SLIGHT-MODERATE PAIN

## 2020-06-11 NOTE — PROGRESS NOTES
Follow up patient note  Interventional spine and sports physiatry, Physical medicine rehabilitation      Chief complaint:   Chief Complaint   Patient presents with   • Follow-Up     Hip Pain          HISTORY    Please see new patient note dated  by Dr Onofre,  for more details.     HPI  Patient identification: Tyler Live 64 y.o. male  With Diagnoses of Cervical radiculopathy, Cervical disc herniation, Cervical spinal stenosis, Osteoarthritis of cervical spine, unspecified spinal osteoarthritis complication status, Greater trochanteric bursitis of right hip, and Bilateral hip joint arthritis were pertinent to this visit.   Procedures:  1/24/2019 right greater trochanteric bursa injection with ultrasound guidance with 80% improvement follow-up visit  6/ 17/2019 C7-T1 interlaminar epidural steroid injection with 90% improvement at the follow-up visit    The radicular component of his neck pain continues to be well controlled after the cervical epidural.  He is having bilateral axial neck pain which is 4-5/10 in intensity aching quality nonradiating.  This is worse with neck movements with decreased range of motion of the neck.    His right lateral hip pain is stable after the above injection.  He is now having pain mostly in the hip joint which is worse with hip movements worse with walking.  This pain is 4-5/10 in intensity and nonradiating.         ROS Red Flags :   Fever, Chills, Sweats: Denies  Involuntary Weight Loss: Denies  Bowel/Bladder Incontinence: Denies  Saddle Anesthesia: Denies        PMHx:   History reviewed. No pertinent past medical history.    PSHx:   Past Surgical History:   Procedure Laterality Date   • CATARACT EXTRACTION WITH IOL Bilateral    • COLONOSCOPY  age 50    neg   • EYE SURGERY  age 4    R eye,trauma       Family history   Family History   Problem Relation Age of Onset   • Cancer Father         melanoma   • GI Disease Mother    • Cancer Sister         breast   • Cancer Maternal  Grandmother         kidney   • Cancer Maternal Grandfather         prostate   • Cancer Paternal Grandmother         liver   • Cancer Paternal Grandfather         throat   • Diabetes Neg Hx    • Heart Disease Neg Hx    • Stroke Neg Hx          Medications:   Outpatient Medications Marked as Taking for the 6/11/20 encounter (Office Visit) with Mert Onofre M.D.   Medication Sig Dispense Refill   • meloxicam (MOBIC) 15 MG tablet Take 1 Tab by mouth every day for 30 days. For 30 days then stop. Do not take other NSAIDs. No refills. 30 Tab 0   • omeprazole (PRILOSEC) 20 MG delayed-release capsule Take 1 Cap by mouth every day. 90 Cap 3   • atorvastatin (LIPITOR) 20 MG Tab Take 1 Tab by mouth every evening. 90 Tab 3        Current Outpatient Medications on File Prior to Visit   Medication Sig Dispense Refill   • omeprazole (PRILOSEC) 20 MG delayed-release capsule Take 1 Cap by mouth every day. 90 Cap 3   • atorvastatin (LIPITOR) 20 MG Tab Take 1 Tab by mouth every evening. 90 Tab 3     No current facility-administered medications on file prior to visit.          Allergies:   No Known Allergies    Social Hx:   Social History     Socioeconomic History   • Marital status:      Spouse name: Not on file   • Number of children: Not on file   • Years of education: Not on file   • Highest education level: Not on file   Occupational History   • Not on file   Social Needs   • Financial resource strain: Not on file   • Food insecurity     Worry: Not on file     Inability: Not on file   • Transportation needs     Medical: Not on file     Non-medical: Not on file   Tobacco Use   • Smoking status: Never Smoker   • Smokeless tobacco: Never Used   Substance and Sexual Activity   • Alcohol use: Not Currently     Frequency: Monthly or less     Comment: rare   • Drug use: No   • Sexual activity: Yes     Partners: Female     Comment: ,  [employer]   Lifestyle   • Physical activity     Days per week: Not on file      "Minutes per session: Not on file   • Stress: Not on file   Relationships   • Social connections     Talks on phone: Not on file     Gets together: Not on file     Attends Confucianism service: Not on file     Active member of club or organization: Not on file     Attends meetings of clubs or organizations: Not on file     Relationship status: Not on file   • Intimate partner violence     Fear of current or ex partner: Not on file     Emotionally abused: Not on file     Physically abused: Not on file     Forced sexual activity: Not on file   Other Topics Concern   •  Service No   • Blood Transfusions No   • Caffeine Concern No   • Occupational Exposure No   • Hobby Hazards No   • Sleep Concern Yes   • Stress Concern No   • Weight Concern No   • Special Diet No   • Back Care No   • Exercise Yes   • Bike Helmet No   • Seat Belt Yes   • Self-Exams Yes   Social History Narrative   • Not on file         EXAMINATION     Physical Exam:   Vitals: /80 (BP Location: Left arm, Patient Position: Sitting, BP Cuff Size: Adult)   Pulse 63   Temp 36.5 °C (97.7 °F) (Temporal)   Ht 1.905 m (6' 3\")   Wt 105.8 kg (233 lb 4 oz)   SpO2 96%     Constitutional:   Body Habitus: Body mass index is 29.15 kg/m².  Cooperation: Fully cooperates with exam  Appearance: Well-groomed no disheveled    Respiratory-  breathing comfortable on room air, no audible wheezing  Cardiovascular- capillary refills less than 2 seconds. No lower extremity edema is noted.   Psychiatric- alert and oriented ×3. Normal affect.    MSK/NEURO: -    There is decreased range of motion of the right hip with varus maneuver positive for reproducing the patient's pain.    For his neck there is decreased range of motion, Spurling's maneuver is negative bilaterally.  Pain with tilting the head to the right and the left reproduces axial neck pain.  Strength is 5/5 in the bilateral upper and lower extremities.    MEDICAL DECISION MAKING    DATA    Labs:   Lab " Results   Component Value Date/Time    SODIUM 142 08/02/2019 06:06 AM    SODIUM 142 09/03/2008 03:38 PM    POTASSIUM 4.7 08/02/2019 06:06 AM    POTASSIUM 3.9 09/03/2008 03:38 PM    CHLORIDE 102 08/02/2019 06:06 AM    CHLORIDE 110 09/03/2008 03:38 PM    CO2 25 08/02/2019 06:06 AM    CO2 28 09/03/2008 03:38 PM    GLUCOSE 97 08/02/2019 06:06 AM    GLUCOSE 90 09/03/2008 03:38 PM    BUN 11 08/02/2019 06:06 AM    BUN 15 09/03/2008 03:38 PM    CREATININE 1.09 08/02/2019 06:06 AM    CREATININE 1.2 09/03/2008 03:38 PM    BUNCREATRAT 10 08/02/2019 06:06 AM        Lab Results   Component Value Date/Time    PROTHROMBTM 12.0 09/03/2008 03:38 PM    INR 1.03 09/03/2008 03:38 PM        Lab Results   Component Value Date/Time    WBC 7.2 09/03/2008 03:38 PM    RBC 4.39 (L) 09/03/2008 03:38 PM    HEMOGLOBIN 14.5 09/03/2008 03:38 PM    HEMATOCRIT 41.3 (L) 09/03/2008 03:38 PM    MCV 94.1 09/03/2008 03:38 PM    MCH 33.0 09/03/2008 03:38 PM    MCHC 35.1 (H) 09/03/2008 03:38 PM    MPV 8.1 09/03/2008 03:38 PM        No results found for: HBA1C     IMAGING  I reviewed the following studies and these are my reads.  MRI cervical spine 5/14/2019  Moderate central canal stenosis  C5-6, mild central canal stenosis C4-5  And C4-5 and this is worse at C5-6.  Facet arthropathy worse right worse than left C3-4, C4-5, C5-6.  C5-6 moderate to severe neuroforaminal stenosis.  Severe neuroforaminal stenosis C5-6 bilaterally..    I reviewed the following studies and these are the radiologist read  X-ray cervical spine 5/1/2019  Impression       1.  No compression deformity or acute fracture identified.    2.  There is degenerative disc disease and facet arthropathy.           MRI cervical spine 5/14/2019  FINDINGS:  The cervical spine maintains normal height and alignment. There is no fracture or dislocation. There is no pathologic marrow infiltration.    At the level of C2-3, there is no spinal or neural foraminal stenosis.    At the level of C3-4, there  is disc degeneration. There is broad-based central disc protrusion. There is effacement of the ventral subarachnoid space. There is mild central canal stenosis.    At the level of C4-5, there is disc degeneration. There are combinations of diffuse disc bulge and ligamentum flavum hypertrophy causing moderate central canal stenosis. Bilateral uncinate joint arthropathy is seen. There is mild bilateral neural   foraminal stenosis.    At the level of C5-6, there is disc degeneration. Posterior lateral osteophytes and disc bulge are seen. There is moderate to severe central canal and moderate bilateral neural foraminal stenosis.    At the level of C6-7, there is disc degeneration. Mild diffuse disc bulge is seen. There is mild effacement of the ventral subarachnoid space.    At the level of C7-T1, there is mild diffuse disc bulge without significant spinal or neural foraminal stenosis.    At the level of T3-4, there is minimal disc bulge without significant spinal or neural foraminal stenosis.    The visualized posterior fossa structures appear normal within limits.  The cervical spinal cord does not demonstrate any mass or abnormal T2 signal intensity.    Megacisterna magna is seen.    The visualized pre-and paraspinal soft tissues appear normal within limits.   Impression       Multifocal degenerative disease in the cervical spine as described above.               DIAGNOSIS   Visit Diagnoses     ICD-10-CM   1. Cervical radiculopathy  M54.12   2. Cervical disc herniation  M50.20   3. Cervical spinal stenosis  M48.02   4. Osteoarthritis of cervical spine, unspecified spinal osteoarthritis complication status  M47.812   5. Greater trochanteric bursitis of right hip  M70.61   6. Bilateral hip joint arthritis  M16.0         ASSESSMENT and PLAN:     Tyler Live 64 y.o. male      Tyler was seen today for follow-up.    Diagnoses and all orders for this visit:    Cervical radiculopathy  -     meloxicam (MOBIC) 15 MG  tablet; Take 1 Tab by mouth every day for 30 days. For 30 days then stop. Do not take other NSAIDs. No refills.  -     REFERRAL TO PHYSICAL THERAPY Reason for Therapy: Eval/Treat/Report    Cervical disc herniation  -     meloxicam (MOBIC) 15 MG tablet; Take 1 Tab by mouth every day for 30 days. For 30 days then stop. Do not take other NSAIDs. No refills.  -     REFERRAL TO PHYSICAL THERAPY Reason for Therapy: Eval/Treat/Report    Cervical spinal stenosis  -     meloxicam (MOBIC) 15 MG tablet; Take 1 Tab by mouth every day for 30 days. For 30 days then stop. Do not take other NSAIDs. No refills.  -     REFERRAL TO PHYSICAL THERAPY Reason for Therapy: Eval/Treat/Report    Osteoarthritis of cervical spine, unspecified spinal osteoarthritis complication status  -     meloxicam (MOBIC) 15 MG tablet; Take 1 Tab by mouth every day for 30 days. For 30 days then stop. Do not take other NSAIDs. No refills.  -     REFERRAL TO PHYSICAL THERAPY Reason for Therapy: Eval/Treat/Report    Greater trochanteric bursitis of right hip  -     meloxicam (MOBIC) 15 MG tablet; Take 1 Tab by mouth every day for 30 days. For 30 days then stop. Do not take other NSAIDs. No refills.  -     REFERRAL TO PHYSICAL THERAPY Reason for Therapy: Eval/Treat/Report    Bilateral hip joint arthritis  -     meloxicam (MOBIC) 15 MG tablet; Take 1 Tab by mouth every day for 30 days. For 30 days then stop. Do not take other NSAIDs. No refills.  -     REFERRAL TO PHYSICAL THERAPY Reason for Therapy: Eval/Treat/Report  -     REFERRAL TO PHYSIATRY (PMR)         I believe the patient cervical radiculopathy is stable and he has no radicular component to his pain.  I believe the majority of his pain now is coming from osteoarthritis of the cervical spine with cervical spondylosis.    His greater trochanter syndrome is stable.  I believe the majority of his hip pain is coming from his intra-articular hip arthritis on the right.    I would like the patient to work  with physical therapy and provided with a home exercise program.  We have also initiated meloxicam as above.  If the patient fails conservative treatments with the above treatment I would consider him for a right intra-articular hip injection for diagnostic and therapeutic purposes.    For his neck pain I would likely offer diagnostic medial branch blocks of most affected level that has not responded to conservative treatments.    Follow up: after PT    Thank you for allowing me to participate in the care of this patient. If you have any questions please not hesitate to contact me.          Please note that this dictation was created using voice recognition software. I have made every reasonable attempt to correct obvious errors but there may be errors of grammar and content that I may have overlooked prior to finalization of this note.      Mert Onofre MD  Interventional Spine and Sports Physiatry  Physical Medicine and Rehabilitation  [facility]

## 2020-06-19 DIAGNOSIS — I77.810 ASCENDING AORTA DILATATION (HCC): ICD-10-CM

## 2020-06-29 ENCOUNTER — HOSPITAL ENCOUNTER (OUTPATIENT)
Dept: CARDIOLOGY | Facility: MEDICAL CENTER | Age: 65
End: 2020-06-29
Attending: NURSE PRACTITIONER
Payer: COMMERCIAL

## 2020-06-29 DIAGNOSIS — I77.810 ASCENDING AORTA DILATATION (HCC): ICD-10-CM

## 2020-06-29 LAB
LV EJECT FRACT  99904: 50
LV EJECT FRACT MOD 2C 99903: 59.42
LV EJECT FRACT MOD 4C 99902: 65.42
LV EJECT FRACT MOD BP 99901: 61.55

## 2020-06-29 PROCEDURE — 93306 TTE W/DOPPLER COMPLETE: CPT | Mod: 26 | Performed by: INTERNAL MEDICINE

## 2020-06-29 PROCEDURE — 93306 TTE W/DOPPLER COMPLETE: CPT

## 2020-06-30 DIAGNOSIS — R93.1 DECREASED CARDIAC EJECTION FRACTION: ICD-10-CM

## 2020-07-20 ENCOUNTER — OFFICE VISIT (OUTPATIENT)
Dept: CARDIOLOGY | Facility: MEDICAL CENTER | Age: 65
End: 2020-07-20
Payer: COMMERCIAL

## 2020-07-20 VITALS
BODY MASS INDEX: 28.85 KG/M2 | WEIGHT: 232 LBS | DIASTOLIC BLOOD PRESSURE: 76 MMHG | HEART RATE: 68 BPM | SYSTOLIC BLOOD PRESSURE: 118 MMHG | HEIGHT: 75 IN | OXYGEN SATURATION: 94 %

## 2020-07-20 DIAGNOSIS — R93.1 DECREASED CARDIAC EJECTION FRACTION: ICD-10-CM

## 2020-07-20 LAB — EKG IMPRESSION: NORMAL

## 2020-07-20 PROCEDURE — 99204 OFFICE O/P NEW MOD 45 MIN: CPT | Performed by: INTERNAL MEDICINE

## 2020-07-20 PROCEDURE — 93000 ELECTROCARDIOGRAM COMPLETE: CPT | Performed by: INTERNAL MEDICINE

## 2020-07-20 RX ORDER — MELOXICAM 7.5 MG/1
7.5 TABLET ORAL PRN
COMMUNITY
End: 2021-07-10

## 2020-07-20 NOTE — PROGRESS NOTES
Cardiology/Arrhythmia Clinic Note (New patient)     DOS: 7/20/2020    Referring physician: Dr Farias    Chief complaint/Reason for consult: Abnormal echo    HPI: 64 y/o M with borderline aortic root dilation who receives serial echocardiograms referred due to recent echocardiogram with ejection fraction 50%.  His echocardiogram in the past have been 65%.  His aortic root is stable.  Patient denies any new symptoms.  He did say that in January he was on the treadmill and he got lightheaded and short of breath after about 10 minutes.  He has not been on the treadmill signs but is taken up running.  He was having no symptoms with running, no shortness of breath on exertion, no palpitations or syncope, no chest pain.  He does have some heartburn.  Recently after his echocardiogram he stopped running or being active at all for fear that it might not be safe for him anymore.  He reports that he had an angiogram over 10 years ago which showed coronary disease but no obstructive lesions.  He has been on atorvastatin for management.    ROS (+ highlighted in bold):  Constitutional: Fevers/chills/fatigue/weightloss  HEENT: Blurry vision/eye pain/sore throat/hearing loss  Respiratory: Shortness of breath/cough  Cardiovascular: Chest pain/palpitations/edema/orthopnea/syncope  GI: Nausea/vomitting/diarrhea  MSK: Arthralgias/myagias/muscle weakness  Skin: Rash/sores  Neurological: Numbness/tremors/vertigo  Endocrine: Excessive thirst/polyuria/cold intolerance/heat intolerance  Psych: Depression/anxiety    No past medical history on file.   Nonobstructive coronary disease by angiogram  Hyperlipidemia    Past Surgical History:   Procedure Laterality Date   • CATARACT EXTRACTION WITH IOL Bilateral    • COLONOSCOPY  age 50    neg   • EYE SURGERY  age 4    R eye,trauma       Social History     Socioeconomic History   • Marital status:      Spouse name: Not on file   • Number of children: Not on file   • Years of education: Not  on file   • Highest education level: Not on file   Occupational History   • Not on file   Social Needs   • Financial resource strain: Not on file   • Food insecurity     Worry: Not on file     Inability: Not on file   • Transportation needs     Medical: Not on file     Non-medical: Not on file   Tobacco Use   • Smoking status: Never Smoker   • Smokeless tobacco: Never Used   Substance and Sexual Activity   • Alcohol use: Not Currently     Frequency: Monthly or less     Comment: rare   • Drug use: No   • Sexual activity: Yes     Partners: Female     Comment: ,  [employer]   Lifestyle   • Physical activity     Days per week: Not on file     Minutes per session: Not on file   • Stress: Not on file   Relationships   • Social connections     Talks on phone: Not on file     Gets together: Not on file     Attends Jew service: Not on file     Active member of club or organization: Not on file     Attends meetings of clubs or organizations: Not on file     Relationship status: Not on file   • Intimate partner violence     Fear of current or ex partner: Not on file     Emotionally abused: Not on file     Physically abused: Not on file     Forced sexual activity: Not on file   Other Topics Concern   •  Service No   • Blood Transfusions No   • Caffeine Concern No   • Occupational Exposure No   • Hobby Hazards No   • Sleep Concern Yes   • Stress Concern No   • Weight Concern No   • Special Diet No   • Back Care No   • Exercise Yes   • Bike Helmet No   • Seat Belt Yes   • Self-Exams Yes   Social History Narrative   • Not on file       Family History   Problem Relation Age of Onset   • Cancer Father         melanoma   • GI Disease Mother    • Cancer Sister         breast   • Cancer Maternal Grandmother         kidney   • Cancer Maternal Grandfather         prostate   • Cancer Paternal Grandmother         liver   • Cancer Paternal Grandfather         throat   • Diabetes Neg Hx    • Heart Disease Neg Hx  "   • Stroke Neg Hx        No Known Allergies    Current Outpatient Medications   Medication Sig Dispense Refill   • meloxicam (MOBIC) 7.5 MG Tab Take 7.5 mg by mouth as needed.     • atorvastatin (LIPITOR) 20 MG Tab Take 1 Tab by mouth every evening. 90 Tab 3   • omeprazole (PRILOSEC) 20 MG delayed-release capsule Take 1 Cap by mouth every day. (Patient not taking: Reported on 7/20/2020) 90 Cap 3     No current facility-administered medications for this visit.        Physical Exam:  Vitals:    07/20/20 0751   BP: 118/76   BP Location: Left arm   Patient Position: Sitting   BP Cuff Size: Large adult   Pulse: 68   SpO2: 94%   Weight: 105.2 kg (232 lb)   Height: 1.905 m (6' 3\")     General appearance: NAD, conversant   Eyes: anicteric sclerae, moist conjunctivae; no lid-lag; PERRLA  HENT: Atraumatic; oropharynx clear with moist mucous membranes and no mucosal ulcerations; normal hard and soft palate  Neck: Trachea midline; FROM, supple, no thyromegaly or lymphadenopathy  Lungs: CTA, with normal respiratory effort and no intercostal retractions  CV: RRR, no MRGs, no JVD   Abdomen: Soft, non-tender; no masses or HSM  Extremities: No peripheral edema or extremity lymphadenopathy  Skin: Normal temperature, turgor and texture; no rash, ulcers or subcutaneous nodules  Psych: Appropriate affect, alert and oriented to person, place and time    Data:  Lipids:   Lab Results   Component Value Date/Time    CHOLSTRLTOT 157 08/02/2019 06:06 AM    TRIGLYCERIDE 147 08/02/2019 06:06 AM    HDL 32 (L) 08/02/2019 06:06 AM    LDL 96 08/02/2019 06:06 AM        BMP:  Lab Results   Component Value Date/Time    SODIUM 142 08/02/2019 0606    POTASSIUM 4.7 08/02/2019 0606    CHLORIDE 102 08/02/2019 0606    CO2 25 08/02/2019 0606    GLUCOSE 97 08/02/2019 0606    BUN 11 08/02/2019 0606    CREATININE 1.09 08/02/2019 0606    CALCIUM 9.8 08/02/2019 0606        TSH:   No results found for: TSHULTRASEN     THYROXINE (T4):   No results found for: " CHARMAINE     CBC:   Lab Results   Component Value Date/Time    WBC 7.2 09/03/2008 03:38 PM    RBC 4.39 (L) 09/03/2008 03:38 PM    HEMOGLOBIN 14.5 09/03/2008 03:38 PM    HEMATOCRIT 41.3 (L) 09/03/2008 03:38 PM    MCV 94.1 09/03/2008 03:38 PM    MCH 33.0 09/03/2008 03:38 PM    MCHC 35.1 (H) 09/03/2008 03:38 PM    RDW 13.2 09/03/2008 03:38 PM    PLATELETCT 212 09/03/2008 03:38 PM    MPV 8.1 09/03/2008 03:38 PM        CBC w/o DIFF  Lab Results   Component Value Date/Time    WBC 7.2 09/03/2008 03:38 PM    RBC 4.39 (L) 09/03/2008 03:38 PM    HEMOGLOBIN 14.5 09/03/2008 03:38 PM    MCV 94.1 09/03/2008 03:38 PM    MCH 33.0 09/03/2008 03:38 PM    MCHC 35.1 (H) 09/03/2008 03:38 PM    RDW 13.2 09/03/2008 03:38 PM    MPV 8.1 09/03/2008 03:38 PM       Prior echo/stress results reviewed: Ejection fraction 50%, aortic root 4.4 cm    Stress test last year, no ischemia    EKG interpreted by me: Sinus rhythm    Impression/Plan:  1. Decreased cardiac ejection fraction  EKG    REFERRAL TO CARDIOLOGY General Cardiology MD     1.  Borderline low ejection fraction, ejection fraction 50%  2.  Nonobstructive coronary disease by history  3.  Hyperlipidemia    -No symptoms of heart failure.  Continue to monitor annual echoes for progression or worsening of systolic dysfunction  -Continue statin management for nonobstructive coronary disease, consider aspirin 81 mg monotherapy as well  -LDL last checked was less than 100    Refer to general cardiology for continued management of nonobstructive atherosclerotic coronary disease and borderline ejection fraction      Aki Church MD  Cardiac Electrophysiology

## 2020-07-28 ENCOUNTER — OFFICE VISIT (OUTPATIENT)
Dept: CARDIOLOGY | Facility: MEDICAL CENTER | Age: 65
End: 2020-07-28
Payer: COMMERCIAL

## 2020-07-28 VITALS
HEIGHT: 76 IN | OXYGEN SATURATION: 97 % | HEART RATE: 63 BPM | BODY MASS INDEX: 28.37 KG/M2 | SYSTOLIC BLOOD PRESSURE: 122 MMHG | WEIGHT: 233 LBS | DIASTOLIC BLOOD PRESSURE: 80 MMHG | RESPIRATION RATE: 15 BRPM

## 2020-07-28 DIAGNOSIS — E78.5 DYSLIPIDEMIA: ICD-10-CM

## 2020-07-28 DIAGNOSIS — I77.810 ASCENDING AORTA DILATATION (HCC): ICD-10-CM

## 2020-07-28 PROCEDURE — 99244 OFF/OP CNSLTJ NEW/EST MOD 40: CPT | Performed by: INTERNAL MEDICINE

## 2020-07-28 ASSESSMENT — ENCOUNTER SYMPTOMS
SPEECH CHANGE: 0
DOUBLE VISION: 0
CHILLS: 0
PND: 0
COUGH: 0
MYALGIAS: 0
PALPITATIONS: 0
BRUISES/BLEEDS EASILY: 0
VOMITING: 0
SHORTNESS OF BREATH: 0
HALLUCINATIONS: 0
EYE PAIN: 0
SENSORY CHANGE: 0
BLURRED VISION: 0
EYE DISCHARGE: 0
ABDOMINAL PAIN: 0
DEPRESSION: 0
CLAUDICATION: 0
FEVER: 0
NAUSEA: 0
WEIGHT LOSS: 0
FALLS: 0
ORTHOPNEA: 0
BLOOD IN STOOL: 0
DIZZINESS: 0
HEADACHES: 0
LOSS OF CONSCIOUSNESS: 0

## 2020-07-28 NOTE — PROGRESS NOTES
Chief Complaint   Patient presents with   • Dyslipidemia       Subjective:   Tyler Live is a 65 y.o. male who presents today for evaluation of abnormal echocardiogram showing LVEF of 50% (which might be underestimated due to image quality). Patient has been doing well without having any symptoms. Patient denies having chest pain, dyspnea, palpitation, presyncope, syncope episodes. Able to climb up at least 2 flights of stairs.    I have personally interpreted her EKG today with patient, there is no evidence of acute coronary syndrome, no evidence of prior infarct, normal MS and QT interval, no significant conduction disease.    No family history of sudden cardiac death.    Does have dilated ascending aorta of 4.4 cm.    History reviewed. No pertinent past medical history.  Past Surgical History:   Procedure Laterality Date   • CATARACT EXTRACTION WITH IOL Bilateral    • COLONOSCOPY  age 50    neg   • EYE SURGERY  age 4    R eye,trauma     Family History   Problem Relation Age of Onset   • Cancer Father         melanoma   • GI Disease Mother    • Cancer Sister         breast   • Cancer Maternal Grandmother         kidney   • Cancer Maternal Grandfather         prostate   • Cancer Paternal Grandmother         liver   • Cancer Paternal Grandfather         throat   • Diabetes Neg Hx    • Heart Disease Neg Hx    • Stroke Neg Hx      Social History     Socioeconomic History   • Marital status:      Spouse name: Not on file   • Number of children: Not on file   • Years of education: Not on file   • Highest education level: Not on file   Occupational History   • Not on file   Social Needs   • Financial resource strain: Not on file   • Food insecurity     Worry: Not on file     Inability: Not on file   • Transportation needs     Medical: Not on file     Non-medical: Not on file   Tobacco Use   • Smoking status: Never Smoker   • Smokeless tobacco: Never Used   Substance and Sexual Activity   • Alcohol use:  Not Currently     Frequency: Monthly or less     Comment: rare   • Drug use: No   • Sexual activity: Yes     Partners: Female     Comment: ,  [employer]   Lifestyle   • Physical activity     Days per week: Not on file     Minutes per session: Not on file   • Stress: Not on file   Relationships   • Social connections     Talks on phone: Not on file     Gets together: Not on file     Attends Cheondoism service: Not on file     Active member of club or organization: Not on file     Attends meetings of clubs or organizations: Not on file     Relationship status: Not on file   • Intimate partner violence     Fear of current or ex partner: Not on file     Emotionally abused: Not on file     Physically abused: Not on file     Forced sexual activity: Not on file   Other Topics Concern   •  Service No   • Blood Transfusions No   • Caffeine Concern No   • Occupational Exposure No   • Hobby Hazards No   • Sleep Concern Yes   • Stress Concern No   • Weight Concern No   • Special Diet No   • Back Care No   • Exercise Yes   • Bike Helmet No   • Seat Belt Yes   • Self-Exams Yes   Social History Narrative   • Not on file     No Known Allergies  Outpatient Encounter Medications as of 7/28/2020   Medication Sig Dispense Refill   • atorvastatin (LIPITOR) 20 MG Tab Take 1 Tab by mouth every evening. 90 Tab 3   • meloxicam (MOBIC) 7.5 MG Tab Take 7.5 mg by mouth as needed.     • omeprazole (PRILOSEC) 20 MG delayed-release capsule Take 1 Cap by mouth every day. (Patient not taking: Reported on 7/20/2020) 90 Cap 3     No facility-administered encounter medications on file as of 7/28/2020.      Review of Systems   Constitutional: Negative for chills, fever, malaise/fatigue and weight loss.   HENT: Negative for ear discharge, ear pain, hearing loss and nosebleeds.    Eyes: Negative for blurred vision, double vision, pain and discharge.   Respiratory: Negative for cough and shortness of breath.    Cardiovascular:  "Negative for chest pain, palpitations, orthopnea, claudication, leg swelling and PND.   Gastrointestinal: Negative for abdominal pain, blood in stool, melena, nausea and vomiting.   Genitourinary: Negative for dysuria and hematuria.   Musculoskeletal: Negative for falls, joint pain and myalgias.   Skin: Negative for itching and rash.   Neurological: Negative for dizziness, sensory change, speech change, loss of consciousness and headaches.   Endo/Heme/Allergies: Negative for environmental allergies. Does not bruise/bleed easily.   Psychiatric/Behavioral: Negative for depression, hallucinations and suicidal ideas.        Objective:   /80 (BP Location: Left arm, Patient Position: Sitting, BP Cuff Size: Adult)   Pulse 63   Resp 15   Ht 1.93 m (6' 4\")   Wt 105.7 kg (233 lb)   SpO2 97%   BMI 28.36 kg/m²     Physical Exam   Constitutional: He is oriented to person, place, and time. No distress.   HENT:   Head: Normocephalic and atraumatic.   Right Ear: External ear normal.   Left Ear: External ear normal.   Eyes: Right eye exhibits no discharge. Left eye exhibits no discharge.   Neck: No JVD present. No thyromegaly present.   Cardiovascular: Normal rate, regular rhythm, normal heart sounds and intact distal pulses. Exam reveals no gallop and no friction rub.   No murmur heard.  Pulmonary/Chest: Breath sounds normal. No respiratory distress.   Abdominal: Bowel sounds are normal. He exhibits no distension. There is no abdominal tenderness.   Musculoskeletal:         General: No tenderness or edema.   Neurological: He is alert and oriented to person, place, and time. No cranial nerve deficit.   Skin: Skin is warm and dry. He is not diaphoretic.   Psychiatric: He has a normal mood and affect. His behavior is normal.   Nursing note and vitals reviewed.      Assessment:     1. Ascending aorta dilatation (HCC)     2. Dyslipidemia         Medical Decision Making:  Today's Assessment / Status / Plan:   At this time " patient is clinically stable in terms of his cardiac standpoint.  Blood pressure is well controlled.  No further cardiac work up.  Doing well.  Clinical monitor for ascending aorta dilation.

## 2020-09-08 DIAGNOSIS — D17.1 LIPOMA OF TORSO: ICD-10-CM

## 2020-09-10 ENCOUNTER — HOSPITAL ENCOUNTER (OUTPATIENT)
Dept: LAB | Facility: MEDICAL CENTER | Age: 65
End: 2020-09-10
Attending: NURSE PRACTITIONER
Payer: COMMERCIAL

## 2020-09-10 DIAGNOSIS — Z00.00 ROUTINE GENERAL MEDICAL EXAMINATION AT A HEALTH CARE FACILITY: ICD-10-CM

## 2020-09-10 LAB
ALBUMIN SERPL BCP-MCNC: 4.6 G/DL (ref 3.2–4.9)
ALBUMIN/GLOB SERPL: 1.8 G/DL
ALP SERPL-CCNC: 69 U/L (ref 30–99)
ALT SERPL-CCNC: 40 U/L (ref 2–50)
ANION GAP SERPL CALC-SCNC: 8 MMOL/L (ref 7–16)
AST SERPL-CCNC: 31 U/L (ref 12–45)
BILIRUB SERPL-MCNC: 1.2 MG/DL (ref 0.1–1.5)
BUN SERPL-MCNC: 14 MG/DL (ref 8–22)
CALCIUM SERPL-MCNC: 9.5 MG/DL (ref 8.5–10.5)
CHLORIDE SERPL-SCNC: 105 MMOL/L (ref 96–112)
CHOLEST SERPL-MCNC: 108 MG/DL (ref 100–199)
CO2 SERPL-SCNC: 29 MMOL/L (ref 20–33)
CREAT SERPL-MCNC: 0.97 MG/DL (ref 0.5–1.4)
ERYTHROCYTE [DISTWIDTH] IN BLOOD BY AUTOMATED COUNT: 44.4 FL (ref 35.9–50)
FASTING STATUS PATIENT QL REPORTED: NORMAL
GLOBULIN SER CALC-MCNC: 2.5 G/DL (ref 1.9–3.5)
GLUCOSE SERPL-MCNC: 89 MG/DL (ref 65–99)
HCT VFR BLD AUTO: 47.8 % (ref 42–52)
HDLC SERPL-MCNC: 34 MG/DL
HGB BLD-MCNC: 16.1 G/DL (ref 14–18)
LDLC SERPL CALC-MCNC: 53 MG/DL
MCH RBC QN AUTO: 31.9 PG (ref 27–33)
MCHC RBC AUTO-ENTMCNC: 33.7 G/DL (ref 33.7–35.3)
MCV RBC AUTO: 94.8 FL (ref 81.4–97.8)
PLATELET # BLD AUTO: 212 K/UL (ref 164–446)
PMV BLD AUTO: 10.4 FL (ref 9–12.9)
POTASSIUM SERPL-SCNC: 4.4 MMOL/L (ref 3.6–5.5)
PROT SERPL-MCNC: 7.1 G/DL (ref 6–8.2)
PSA SERPL-MCNC: 1.7 NG/ML (ref 0–4)
RBC # BLD AUTO: 5.04 M/UL (ref 4.7–6.1)
SODIUM SERPL-SCNC: 142 MMOL/L (ref 135–145)
TRIGL SERPL-MCNC: 103 MG/DL (ref 0–149)
WBC # BLD AUTO: 6.4 K/UL (ref 4.8–10.8)

## 2020-09-10 PROCEDURE — 80053 COMPREHEN METABOLIC PANEL: CPT

## 2020-09-10 PROCEDURE — 36415 COLL VENOUS BLD VENIPUNCTURE: CPT

## 2020-09-10 PROCEDURE — 80061 LIPID PANEL: CPT

## 2020-09-10 PROCEDURE — 84153 ASSAY OF PSA TOTAL: CPT

## 2020-09-10 PROCEDURE — 85027 COMPLETE CBC AUTOMATED: CPT

## 2020-10-15 DIAGNOSIS — L98.9 SKIN LESION: ICD-10-CM

## 2020-11-03 ENCOUNTER — APPOINTMENT (RX ONLY)
Dept: URBAN - METROPOLITAN AREA CLINIC 20 | Facility: CLINIC | Age: 65
Setting detail: DERMATOLOGY
End: 2020-11-03

## 2020-11-03 DIAGNOSIS — L81.4 OTHER MELANIN HYPERPIGMENTATION: ICD-10-CM

## 2020-11-03 DIAGNOSIS — Z71.89 OTHER SPECIFIED COUNSELING: ICD-10-CM

## 2020-11-03 DIAGNOSIS — L57.0 ACTINIC KERATOSIS: ICD-10-CM

## 2020-11-03 DIAGNOSIS — D18.0 HEMANGIOMA: ICD-10-CM

## 2020-11-03 DIAGNOSIS — L82.0 INFLAMED SEBORRHEIC KERATOSIS: ICD-10-CM

## 2020-11-03 DIAGNOSIS — D22 MELANOCYTIC NEVI: ICD-10-CM

## 2020-11-03 DIAGNOSIS — L20.89 OTHER ATOPIC DERMATITIS: ICD-10-CM

## 2020-11-03 DIAGNOSIS — L11.1 TRANSIENT ACANTHOLYTIC DERMATOSIS [GROVER]: ICD-10-CM

## 2020-11-03 DIAGNOSIS — Z85.828 PERSONAL HISTORY OF OTHER MALIGNANT NEOPLASM OF SKIN: ICD-10-CM

## 2020-11-03 DIAGNOSIS — L82.1 OTHER SEBORRHEIC KERATOSIS: ICD-10-CM

## 2020-11-03 PROBLEM — D18.01 HEMANGIOMA OF SKIN AND SUBCUTANEOUS TISSUE: Status: ACTIVE | Noted: 2020-11-03

## 2020-11-03 PROBLEM — D22.72 MELANOCYTIC NEVI OF LEFT LOWER LIMB, INCLUDING HIP: Status: ACTIVE | Noted: 2020-11-03

## 2020-11-03 PROBLEM — D22.62 MELANOCYTIC NEVI OF LEFT UPPER LIMB, INCLUDING SHOULDER: Status: ACTIVE | Noted: 2020-11-03

## 2020-11-03 PROBLEM — D22.71 MELANOCYTIC NEVI OF RIGHT LOWER LIMB, INCLUDING HIP: Status: ACTIVE | Noted: 2020-11-03

## 2020-11-03 PROBLEM — D48.5 NEOPLASM OF UNCERTAIN BEHAVIOR OF SKIN: Status: ACTIVE | Noted: 2020-11-03

## 2020-11-03 PROBLEM — L20.84 INTRINSIC (ALLERGIC) ECZEMA: Status: ACTIVE | Noted: 2020-11-03

## 2020-11-03 PROBLEM — D22.5 MELANOCYTIC NEVI OF TRUNK: Status: ACTIVE | Noted: 2020-11-03

## 2020-11-03 PROBLEM — D22.39 MELANOCYTIC NEVI OF OTHER PARTS OF FACE: Status: ACTIVE | Noted: 2020-11-03

## 2020-11-03 PROBLEM — D22.61 MELANOCYTIC NEVI OF RIGHT UPPER LIMB, INCLUDING SHOULDER: Status: ACTIVE | Noted: 2020-11-03

## 2020-11-03 PROCEDURE — 11102 TANGNTL BX SKIN SINGLE LES: CPT

## 2020-11-03 PROCEDURE — 17000 DESTRUCT PREMALG LESION: CPT | Mod: 59

## 2020-11-03 PROCEDURE — ? OBSERVATION

## 2020-11-03 PROCEDURE — ? COUNSELING

## 2020-11-03 PROCEDURE — ? SUNSCREEN RECOMMENDATIONS

## 2020-11-03 PROCEDURE — 99214 OFFICE O/P EST MOD 30 MIN: CPT | Mod: 25

## 2020-11-03 PROCEDURE — ? BIOPSY BY SHAVE METHOD

## 2020-11-03 PROCEDURE — ? LIQUID NITROGEN

## 2020-11-03 PROCEDURE — ? PRESCRIPTION

## 2020-11-03 PROCEDURE — ? COUNSELING: TOPICAL STEROIDS

## 2020-11-03 RX ORDER — CLOBETASOL PROPIONATE 0.5 MG/G
CREAM TOPICAL
Qty: 1 | Refills: 0 | Status: ERX

## 2020-11-03 ASSESSMENT — LOCATION DETAILED DESCRIPTION DERM
LOCATION DETAILED: LEFT ANTERIOR PROXIMAL THIGH
LOCATION DETAILED: INFERIOR THORACIC SPINE
LOCATION DETAILED: LEFT VENTRAL PROXIMAL FOREARM
LOCATION DETAILED: RIGHT CENTRAL MALAR CHEEK
LOCATION DETAILED: RIGHT INFERIOR MEDIAL MIDBACK
LOCATION DETAILED: RIGHT DISTAL POSTERIOR UPPER ARM
LOCATION DETAILED: RIGHT INFERIOR FOREHEAD
LOCATION DETAILED: LEFT DISTAL POSTERIOR THIGH
LOCATION DETAILED: RIGHT SUPERIOR UPPER BACK
LOCATION DETAILED: LEFT LATERAL PROXIMAL PRETIBIAL REGION
LOCATION DETAILED: RIGHT DISTAL POSTERIOR THIGH
LOCATION DETAILED: LEFT PROXIMAL POSTERIOR UPPER ARM
LOCATION DETAILED: RIGHT VENTRAL PROXIMAL FOREARM
LOCATION DETAILED: SUPERIOR THORACIC SPINE
LOCATION DETAILED: RIGHT SUPERIOR LATERAL UPPER BACK
LOCATION DETAILED: EPIGASTRIC SKIN
LOCATION DETAILED: RIGHT PROXIMAL PRETIBIAL REGION
LOCATION DETAILED: RIGHT ELBOW
LOCATION DETAILED: RIGHT INFERIOR MEDIAL UPPER BACK
LOCATION DETAILED: RIGHT INFERIOR CENTRAL MALAR CHEEK
LOCATION DETAILED: LEFT ELBOW

## 2020-11-03 ASSESSMENT — LOCATION ZONE DERM
LOCATION ZONE: ARM
LOCATION ZONE: TRUNK
LOCATION ZONE: FACE
LOCATION ZONE: LEG

## 2020-11-03 ASSESSMENT — LOCATION SIMPLE DESCRIPTION DERM
LOCATION SIMPLE: RIGHT CHEEK
LOCATION SIMPLE: RIGHT POSTERIOR UPPER ARM
LOCATION SIMPLE: LEFT POSTERIOR UPPER ARM
LOCATION SIMPLE: LEFT POSTERIOR THIGH
LOCATION SIMPLE: RIGHT LOWER BACK
LOCATION SIMPLE: RIGHT PRETIBIAL REGION
LOCATION SIMPLE: RIGHT POSTERIOR THIGH
LOCATION SIMPLE: RIGHT FOREARM
LOCATION SIMPLE: RIGHT ELBOW
LOCATION SIMPLE: LEFT FOREARM
LOCATION SIMPLE: LEFT THIGH
LOCATION SIMPLE: RIGHT FOREHEAD
LOCATION SIMPLE: LEFT ELBOW
LOCATION SIMPLE: ABDOMEN
LOCATION SIMPLE: RIGHT UPPER BACK
LOCATION SIMPLE: UPPER BACK
LOCATION SIMPLE: LEFT PRETIBIAL REGION

## 2020-11-03 NOTE — PROCEDURE: LIQUID NITROGEN
Render Note In Bullet Format When Appropriate: No
Consent: The patient's consent was obtained including but not limited to risks of crusting, scabbing, blistering, scarring, darker or lighter pigmentary change, recurrence, incomplete removal and infection. RTC in 2 months if lesion(s) persistent.
Detail Level: Detailed
Render Post-Care Instructions In Note?: yes
Duration Of Freeze Thaw-Cycle (Seconds): 10
Post-Care Instructions: I reviewed with the patient in detail post-care instructions. Patient is to wear sunprotection, and avoid picking at any of the treated lesions. Pt may apply Vaseline to crusted or scabbing areas.
Number Of Freeze-Thaw Cycles: 2 freeze-thaw cycles

## 2021-02-16 DIAGNOSIS — E78.5 DYSLIPIDEMIA: ICD-10-CM

## 2021-02-16 RX ORDER — ATORVASTATIN CALCIUM 20 MG/1
TABLET, FILM COATED ORAL
Qty: 30 TABLET | Refills: 5 | Status: SHIPPED | OUTPATIENT
Start: 2021-02-16 | End: 2021-04-16 | Stop reason: SDUPTHER

## 2021-03-03 DIAGNOSIS — Z23 NEED FOR VACCINATION: ICD-10-CM

## 2021-04-16 DIAGNOSIS — E78.5 DYSLIPIDEMIA: ICD-10-CM

## 2021-04-16 RX ORDER — ATORVASTATIN CALCIUM 20 MG/1
20 TABLET, FILM COATED ORAL
Qty: 90 TABLET | Refills: 2 | Status: SHIPPED | OUTPATIENT
Start: 2021-04-16 | End: 2022-10-27 | Stop reason: SDUPTHER

## 2021-06-04 ENCOUNTER — APPOINTMENT (RX ONLY)
Dept: URBAN - METROPOLITAN AREA CLINIC 20 | Facility: CLINIC | Age: 66
Setting detail: DERMATOLOGY
End: 2021-06-04

## 2021-06-04 DIAGNOSIS — L82.1 OTHER SEBORRHEIC KERATOSIS: ICD-10-CM

## 2021-06-04 DIAGNOSIS — Z85.828 PERSONAL HISTORY OF OTHER MALIGNANT NEOPLASM OF SKIN: ICD-10-CM

## 2021-06-04 DIAGNOSIS — L20.89 OTHER ATOPIC DERMATITIS: ICD-10-CM

## 2021-06-04 DIAGNOSIS — D18.0 HEMANGIOMA: ICD-10-CM

## 2021-06-04 DIAGNOSIS — D22 MELANOCYTIC NEVI: ICD-10-CM

## 2021-06-04 DIAGNOSIS — L57.0 ACTINIC KERATOSIS: ICD-10-CM

## 2021-06-04 DIAGNOSIS — Z71.89 OTHER SPECIFIED COUNSELING: ICD-10-CM

## 2021-06-04 DIAGNOSIS — L81.4 OTHER MELANIN HYPERPIGMENTATION: ICD-10-CM

## 2021-06-04 PROBLEM — D22.61 MELANOCYTIC NEVI OF RIGHT UPPER LIMB, INCLUDING SHOULDER: Status: ACTIVE | Noted: 2021-06-04

## 2021-06-04 PROBLEM — D22.5 MELANOCYTIC NEVI OF TRUNK: Status: ACTIVE | Noted: 2021-06-04

## 2021-06-04 PROBLEM — D22.71 MELANOCYTIC NEVI OF RIGHT LOWER LIMB, INCLUDING HIP: Status: ACTIVE | Noted: 2021-06-04

## 2021-06-04 PROBLEM — D22.72 MELANOCYTIC NEVI OF LEFT LOWER LIMB, INCLUDING HIP: Status: ACTIVE | Noted: 2021-06-04

## 2021-06-04 PROBLEM — D22.62 MELANOCYTIC NEVI OF LEFT UPPER LIMB, INCLUDING SHOULDER: Status: ACTIVE | Noted: 2021-06-04

## 2021-06-04 PROBLEM — L20.84 INTRINSIC (ALLERGIC) ECZEMA: Status: ACTIVE | Noted: 2021-06-04

## 2021-06-04 PROBLEM — D22.39 MELANOCYTIC NEVI OF OTHER PARTS OF FACE: Status: ACTIVE | Noted: 2021-06-04

## 2021-06-04 PROBLEM — D48.5 NEOPLASM OF UNCERTAIN BEHAVIOR OF SKIN: Status: ACTIVE | Noted: 2021-06-04

## 2021-06-04 PROBLEM — D18.01 HEMANGIOMA OF SKIN AND SUBCUTANEOUS TISSUE: Status: ACTIVE | Noted: 2021-06-04

## 2021-06-04 PROCEDURE — ? BIOPSY BY SHAVE METHOD

## 2021-06-04 PROCEDURE — ? LIQUID NITROGEN

## 2021-06-04 PROCEDURE — 17003 DESTRUCT PREMALG LES 2-14: CPT

## 2021-06-04 PROCEDURE — 99213 OFFICE O/P EST LOW 20 MIN: CPT | Mod: 25

## 2021-06-04 PROCEDURE — ? COUNSELING

## 2021-06-04 PROCEDURE — ? SUNSCREEN RECOMMENDATIONS

## 2021-06-04 PROCEDURE — 17000 DESTRUCT PREMALG LESION: CPT | Mod: 59

## 2021-06-04 PROCEDURE — 11102 TANGNTL BX SKIN SINGLE LES: CPT

## 2021-06-04 PROCEDURE — ? OBSERVATION

## 2021-06-04 ASSESSMENT — LOCATION DETAILED DESCRIPTION DERM
LOCATION DETAILED: RIGHT DISTAL POSTERIOR UPPER ARM
LOCATION DETAILED: RIGHT PROXIMAL PRETIBIAL REGION
LOCATION DETAILED: LEFT PROXIMAL POSTERIOR UPPER ARM
LOCATION DETAILED: LEFT LATERAL PROXIMAL PRETIBIAL REGION
LOCATION DETAILED: SUPERIOR THORACIC SPINE
LOCATION DETAILED: RIGHT INFERIOR MEDIAL UPPER BACK
LOCATION DETAILED: SUPERIOR LUMBAR SPINE
LOCATION DETAILED: RIGHT INFERIOR CENTRAL MALAR CHEEK
LOCATION DETAILED: RIGHT SUPERIOR UPPER BACK
LOCATION DETAILED: POSTERIOR MID-PARIETAL SCALP
LOCATION DETAILED: RIGHT DISTAL PRETIBIAL REGION
LOCATION DETAILED: RIGHT VENTRAL PROXIMAL FOREARM
LOCATION DETAILED: RIGHT INFERIOR FOREHEAD
LOCATION DETAILED: RIGHT DISTAL POSTERIOR THIGH
LOCATION DETAILED: RIGHT INFERIOR MEDIAL MIDBACK
LOCATION DETAILED: LEFT SUPERIOR PARIETAL SCALP
LOCATION DETAILED: LEFT DISTAL POSTERIOR THIGH
LOCATION DETAILED: INFERIOR THORACIC SPINE
LOCATION DETAILED: LEFT VENTRAL PROXIMAL FOREARM

## 2021-06-04 ASSESSMENT — LOCATION ZONE DERM
LOCATION ZONE: LEG
LOCATION ZONE: ARM
LOCATION ZONE: FACE
LOCATION ZONE: TRUNK
LOCATION ZONE: SCALP

## 2021-06-04 ASSESSMENT — LOCATION SIMPLE DESCRIPTION DERM
LOCATION SIMPLE: RIGHT FOREHEAD
LOCATION SIMPLE: LOWER BACK
LOCATION SIMPLE: LEFT PRETIBIAL REGION
LOCATION SIMPLE: RIGHT LOWER BACK
LOCATION SIMPLE: RIGHT POSTERIOR THIGH
LOCATION SIMPLE: UPPER BACK
LOCATION SIMPLE: LEFT POSTERIOR UPPER ARM
LOCATION SIMPLE: RIGHT POSTERIOR UPPER ARM
LOCATION SIMPLE: SCALP
LOCATION SIMPLE: RIGHT FOREARM
LOCATION SIMPLE: RIGHT CHEEK
LOCATION SIMPLE: RIGHT UPPER BACK
LOCATION SIMPLE: RIGHT PRETIBIAL REGION
LOCATION SIMPLE: LEFT FOREARM
LOCATION SIMPLE: POSTERIOR SCALP
LOCATION SIMPLE: LEFT POSTERIOR THIGH

## 2021-06-04 NOTE — PROCEDURE: LIQUID NITROGEN
Number Of Freeze-Thaw Cycles: 2 freeze-thaw cycles
Duration Of Freeze Thaw-Cycle (Seconds): 10
Post-Care Instructions: I reviewed with the patient in detail post-care instructions. Patient is to wear sunprotection, and avoid picking at any of the treated lesions. Pt may apply Vaseline to crusted or scabbing areas.
Consent: The patient's consent was obtained including but not limited to risks of crusting, scabbing, blistering, scarring, darker or lighter pigmentary change, recurrence, incomplete removal and infection. RTC in 2 months if lesion(s) persistent.
Render Note In Bullet Format When Appropriate: No
Render Post-Care Instructions In Note?: yes
Detail Level: Detailed

## 2021-07-10 DIAGNOSIS — I71.40 ABDOMINAL AORTIC ANEURYSM (AAA) 3.0 CM TO 5.5 CM IN DIAMETER IN MALE (HCC): ICD-10-CM

## 2021-07-10 DIAGNOSIS — Z12.11 COLON CANCER SCREENING: ICD-10-CM

## 2021-07-10 DIAGNOSIS — I77.810 ASCENDING AORTA DILATATION (HCC): ICD-10-CM

## 2021-08-11 ENCOUNTER — OFFICE VISIT (OUTPATIENT)
Dept: CARDIOLOGY | Facility: MEDICAL CENTER | Age: 66
End: 2021-08-11
Payer: MEDICARE

## 2021-08-11 VITALS
DIASTOLIC BLOOD PRESSURE: 78 MMHG | HEIGHT: 76 IN | SYSTOLIC BLOOD PRESSURE: 122 MMHG | WEIGHT: 239 LBS | BODY MASS INDEX: 29.1 KG/M2 | HEART RATE: 77 BPM | OXYGEN SATURATION: 95 % | RESPIRATION RATE: 14 BRPM

## 2021-08-11 DIAGNOSIS — E78.5 DYSLIPIDEMIA: ICD-10-CM

## 2021-08-11 DIAGNOSIS — I77.810 ASCENDING AORTA DILATATION (HCC): ICD-10-CM

## 2021-08-11 PROCEDURE — 99214 OFFICE O/P EST MOD 30 MIN: CPT | Performed by: INTERNAL MEDICINE

## 2021-08-11 RX ORDER — AMOXICILLIN 500 MG/1
CAPSULE ORAL
COMMUNITY
Start: 2021-05-27 | End: 2021-08-11

## 2021-08-11 ASSESSMENT — ENCOUNTER SYMPTOMS
FEVER: 0
COUGH: 0
EYE PAIN: 0
DIZZINESS: 0
DEPRESSION: 0
MYALGIAS: 0
CLAUDICATION: 0
PND: 0
SENSORY CHANGE: 0
FALLS: 0
HALLUCINATIONS: 0
VOMITING: 0
NAUSEA: 0
LOSS OF CONSCIOUSNESS: 0
ORTHOPNEA: 0
SHORTNESS OF BREATH: 0
DOUBLE VISION: 0
HEADACHES: 0
CHILLS: 0
BLURRED VISION: 0
BRUISES/BLEEDS EASILY: 0
PALPITATIONS: 0
SPEECH CHANGE: 0
WEIGHT LOSS: 0
ABDOMINAL PAIN: 0
EYE DISCHARGE: 0
BLOOD IN STOOL: 0

## 2021-08-11 ASSESSMENT — FIBROSIS 4 INDEX: FIB4 SCORE: 1.53

## 2021-08-11 NOTE — PROGRESS NOTES
Chief Complaint   Patient presents with   • Dyslipidemia       Subjective:   Tyler Live is a 66 y.o. male who presents today for evaluation of abnormal echocardiogram showing LVEF of 50% (which might be underestimated due to image quality). Patient has been doing well without having any symptoms. Patient denies having chest pain, dyspnea, palpitation, presyncope, syncope episodes. Able to climb up at least 2 flights of stairs.    No family history of sudden cardiac death.    Does have dilated ascending aorta of 4.4 cm.    01/2021 Had right hip surgery and BP dropped 1 day post op.    History reviewed. No pertinent past medical history.  Past Surgical History:   Procedure Laterality Date   • CATARACT EXTRACTION WITH IOL Bilateral    • COLONOSCOPY  age 50    neg   • EYE SURGERY  age 4    R eye,trauma     Family History   Problem Relation Age of Onset   • Cancer Father         melanoma   • GI Disease Mother    • Cancer Sister         breast   • Cancer Maternal Grandmother         kidney   • Cancer Maternal Grandfather         prostate   • Cancer Paternal Grandmother         liver   • Cancer Paternal Grandfather         throat   • Diabetes Neg Hx    • Heart Disease Neg Hx    • Stroke Neg Hx      Social History     Socioeconomic History   • Marital status:      Spouse name: Not on file   • Number of children: Not on file   • Years of education: Not on file   • Highest education level: Not on file   Occupational History   • Not on file   Tobacco Use   • Smoking status: Never Smoker   • Smokeless tobacco: Never Used   Vaping Use   • Vaping Use: Never used   Substance and Sexual Activity   • Alcohol use: Not Currently     Comment: rare   • Drug use: No   • Sexual activity: Yes     Partners: Female     Comment: ,  [employer]   Other Topics Concern   •  Service No   • Blood Transfusions No   • Caffeine Concern No   • Occupational Exposure No   • Hobby Hazards No   • Sleep Concern Yes    • Stress Concern No   • Weight Concern No   • Special Diet No   • Back Care No   • Exercise Yes   • Bike Helmet No   • Seat Belt Yes   • Self-Exams Yes   Social History Narrative   • Not on file     Social Determinants of Health     Financial Resource Strain:    • Difficulty of Paying Living Expenses:    Food Insecurity:    • Worried About Running Out of Food in the Last Year:    • Ran Out of Food in the Last Year:    Transportation Needs:    • Lack of Transportation (Medical):    • Lack of Transportation (Non-Medical):    Physical Activity:    • Days of Exercise per Week:    • Minutes of Exercise per Session:    Stress:    • Feeling of Stress :    Social Connections:    • Frequency of Communication with Friends and Family:    • Frequency of Social Gatherings with Friends and Family:    • Attends Jewish Services:    • Active Member of Clubs or Organizations:    • Attends Club or Organization Meetings:    • Marital Status:    Intimate Partner Violence:    • Fear of Current or Ex-Partner:    • Emotionally Abused:    • Physically Abused:    • Sexually Abused:      No Known Allergies  Outpatient Encounter Medications as of 8/11/2021   Medication Sig Dispense Refill   • atorvastatin (LIPITOR) 20 MG Tab Take 1 tablet by mouth every day. N THE EVENING 90 tablet 2   • amoxicillin (AMOXIL) 500 MG Cap  (Patient not taking: Reported on 8/11/2021)       No facility-administered encounter medications on file as of 8/11/2021.     Review of Systems   Constitutional: Negative for chills, fever, malaise/fatigue and weight loss.   HENT: Negative for ear discharge, ear pain, hearing loss and nosebleeds.    Eyes: Negative for blurred vision, double vision, pain and discharge.   Respiratory: Negative for cough and shortness of breath.    Cardiovascular: Negative for chest pain, palpitations, orthopnea, claudication, leg swelling and PND.   Gastrointestinal: Negative for abdominal pain, blood in stool, melena, nausea and vomiting.  "  Genitourinary: Negative for dysuria and hematuria.   Musculoskeletal: Negative for falls, joint pain and myalgias.   Skin: Negative for itching and rash.   Neurological: Negative for dizziness, sensory change, speech change, loss of consciousness and headaches.   Endo/Heme/Allergies: Negative for environmental allergies. Does not bruise/bleed easily.   Psychiatric/Behavioral: Negative for depression, hallucinations and suicidal ideas.        Objective:   /78 (BP Location: Left arm, Patient Position: Sitting, BP Cuff Size: Adult)   Pulse 77   Resp 14   Ht 1.93 m (6' 4\")   Wt 108 kg (239 lb)   SpO2 95%   BMI 29.09 kg/m²     Physical Exam   Constitutional: He is oriented to person, place, and time. No distress.   HENT:   Head: Normocephalic and atraumatic.   Right Ear: External ear normal.   Left Ear: External ear normal.   Eyes: Right eye exhibits no discharge. Left eye exhibits no discharge.   Neck: No JVD present. No thyromegaly present.   Cardiovascular: Normal rate, regular rhythm and normal heart sounds. Exam reveals no gallop and no friction rub.   No murmur heard.  Pulmonary/Chest: Breath sounds normal. No respiratory distress.   Abdominal: Bowel sounds are normal. He exhibits no distension. There is no abdominal tenderness.   Musculoskeletal:         General: No tenderness.   Neurological: He is alert and oriented to person, place, and time. No cranial nerve deficit.   Skin: Skin is warm and dry. He is not diaphoretic.   Psychiatric: His behavior is normal.   Nursing note and vitals reviewed.      Assessment:     1. Ascending aorta dilatation (HCC)     2. Dyslipidemia         Medical Decision Making:  Today's Assessment / Status / Plan:   At this time patient is clinically stable in terms of his cardiac standpoint.  Blood pressure is well controlled.  Doing well.  Clinical monitor for ascending aorta dilation. Await echocardiogram scheduled for 10/2021.      "

## 2021-10-01 ENCOUNTER — HOSPITAL ENCOUNTER (OUTPATIENT)
Dept: CARDIOLOGY | Facility: MEDICAL CENTER | Age: 66
End: 2021-10-01
Attending: FAMILY MEDICINE
Payer: MEDICARE

## 2021-10-01 ENCOUNTER — HOSPITAL ENCOUNTER (OUTPATIENT)
Dept: RADIOLOGY | Facility: MEDICAL CENTER | Age: 66
End: 2021-10-01
Attending: FAMILY MEDICINE
Payer: MEDICARE

## 2021-10-01 DIAGNOSIS — I77.810 ASCENDING AORTA DILATATION (HCC): ICD-10-CM

## 2021-10-01 DIAGNOSIS — I71.40 ABDOMINAL AORTIC ANEURYSM (AAA) 3.0 CM TO 5.5 CM IN DIAMETER IN MALE (HCC): ICD-10-CM

## 2021-10-01 LAB
LV EJECT FRACT  99904: 55
LV EJECT FRACT MOD 2C 99903: 60.08
LV EJECT FRACT MOD 4C 99902: 52.51
LV EJECT FRACT MOD BP 99901: 56.61

## 2021-10-01 PROCEDURE — 93978 VASCULAR STUDY: CPT

## 2021-10-01 PROCEDURE — 93978 VASCULAR STUDY: CPT | Mod: 26 | Performed by: INTERNAL MEDICINE

## 2021-10-01 PROCEDURE — 93306 TTE W/DOPPLER COMPLETE: CPT

## 2021-10-01 PROCEDURE — 93306 TTE W/DOPPLER COMPLETE: CPT | Mod: 26 | Performed by: INTERNAL MEDICINE

## 2021-10-20 PROBLEM — M16.12 PRIMARY OSTEOARTHRITIS OF LEFT HIP: Status: ACTIVE | Noted: 2021-10-20

## 2021-10-27 ENCOUNTER — HOSPITAL ENCOUNTER (OUTPATIENT)
Dept: LAB | Facility: MEDICAL CENTER | Age: 66
End: 2021-10-27
Attending: ORTHOPAEDIC SURGERY
Payer: MEDICARE

## 2021-10-27 ENCOUNTER — HOSPITAL ENCOUNTER (OUTPATIENT)
Dept: LAB | Facility: MEDICAL CENTER | Age: 66
End: 2021-10-27
Attending: INTERNAL MEDICINE
Payer: MEDICARE

## 2021-10-27 DIAGNOSIS — M16.12 PRIMARY OSTEOARTHRITIS OF LEFT HIP: ICD-10-CM

## 2021-10-27 LAB
ALBUMIN SERPL BCP-MCNC: 4.5 G/DL (ref 3.2–4.9)
ALBUMIN/GLOB SERPL: 1.7 G/DL
ALP SERPL-CCNC: 57 U/L (ref 30–99)
ALT SERPL-CCNC: 27 U/L (ref 2–50)
ANION GAP SERPL CALC-SCNC: 11 MMOL/L (ref 7–16)
ANION GAP SERPL CALC-SCNC: 12 MMOL/L (ref 7–16)
APPEARANCE UR: CLEAR
APTT PPP: 31.3 SEC (ref 24.7–36)
AST SERPL-CCNC: 25 U/L (ref 12–45)
BASOPHILS # BLD AUTO: 0.3 % (ref 0–1.8)
BASOPHILS # BLD: 0.02 K/UL (ref 0–0.12)
BILIRUB SERPL-MCNC: 1 MG/DL (ref 0.1–1.5)
BILIRUB UR QL STRIP.AUTO: NEGATIVE
BUN SERPL-MCNC: 16 MG/DL (ref 8–22)
BUN SERPL-MCNC: 16 MG/DL (ref 8–22)
CALCIUM SERPL-MCNC: 9.4 MG/DL (ref 8.5–10.5)
CALCIUM SERPL-MCNC: 9.4 MG/DL (ref 8.5–10.5)
CHLORIDE SERPL-SCNC: 106 MMOL/L (ref 96–112)
CHLORIDE SERPL-SCNC: 107 MMOL/L (ref 96–112)
CHOLEST SERPL-MCNC: 115 MG/DL (ref 100–199)
CO2 SERPL-SCNC: 25 MMOL/L (ref 20–33)
CO2 SERPL-SCNC: 25 MMOL/L (ref 20–33)
COLOR UR: YELLOW
CREAT SERPL-MCNC: 0.91 MG/DL (ref 0.5–1.4)
CREAT SERPL-MCNC: 0.93 MG/DL (ref 0.5–1.4)
EOSINOPHIL # BLD AUTO: 0.08 K/UL (ref 0–0.51)
EOSINOPHIL NFR BLD: 1.3 % (ref 0–6.9)
ERYTHROCYTE [DISTWIDTH] IN BLOOD BY AUTOMATED COUNT: 45 FL (ref 35.9–50)
FASTING STATUS PATIENT QL REPORTED: NORMAL
GLOBULIN SER CALC-MCNC: 2.6 G/DL (ref 1.9–3.5)
GLUCOSE SERPL-MCNC: 98 MG/DL (ref 65–99)
GLUCOSE SERPL-MCNC: 98 MG/DL (ref 65–99)
GLUCOSE UR STRIP.AUTO-MCNC: NEGATIVE MG/DL
HCT VFR BLD AUTO: 43.9 % (ref 42–52)
HDLC SERPL-MCNC: 35 MG/DL
HGB BLD-MCNC: 14.7 G/DL (ref 14–18)
HIV 1+2 AB+HIV1 P24 AG SERPL QL IA: NORMAL
IMM GRANULOCYTES # BLD AUTO: 0.02 K/UL (ref 0–0.11)
IMM GRANULOCYTES NFR BLD AUTO: 0.3 % (ref 0–0.9)
INR PPP: 1.02 (ref 0.87–1.13)
KETONES UR STRIP.AUTO-MCNC: NEGATIVE MG/DL
LDLC SERPL CALC-MCNC: 63 MG/DL
LEUKOCYTE ESTERASE UR QL STRIP.AUTO: NEGATIVE
LYMPHOCYTES # BLD AUTO: 1.48 K/UL (ref 1–4.8)
LYMPHOCYTES NFR BLD: 24.3 % (ref 22–41)
MCH RBC QN AUTO: 31.7 PG (ref 27–33)
MCHC RBC AUTO-ENTMCNC: 33.5 G/DL (ref 33.7–35.3)
MCV RBC AUTO: 94.8 FL (ref 81.4–97.8)
MICRO URNS: NORMAL
MONOCYTES # BLD AUTO: 0.67 K/UL (ref 0–0.85)
MONOCYTES NFR BLD AUTO: 11 % (ref 0–13.4)
NEUTROPHILS # BLD AUTO: 3.82 K/UL (ref 1.82–7.42)
NEUTROPHILS NFR BLD: 62.8 % (ref 44–72)
NITRITE UR QL STRIP.AUTO: NEGATIVE
NRBC # BLD AUTO: 0 K/UL
NRBC BLD-RTO: 0 /100 WBC
PH UR STRIP.AUTO: 6 [PH] (ref 5–8)
PLATELET # BLD AUTO: 215 K/UL (ref 164–446)
PMV BLD AUTO: 10.2 FL (ref 9–12.9)
POTASSIUM SERPL-SCNC: 4.1 MMOL/L (ref 3.6–5.5)
POTASSIUM SERPL-SCNC: 4.2 MMOL/L (ref 3.6–5.5)
PROT SERPL-MCNC: 7.1 G/DL (ref 6–8.2)
PROT UR QL STRIP: NEGATIVE MG/DL
PROTHROMBIN TIME: 13.1 SEC (ref 12–14.6)
RBC # BLD AUTO: 4.63 M/UL (ref 4.7–6.1)
RBC UR QL AUTO: NEGATIVE
SODIUM SERPL-SCNC: 142 MMOL/L (ref 135–145)
SODIUM SERPL-SCNC: 144 MMOL/L (ref 135–145)
SP GR UR STRIP.AUTO: 1.02
TRIGL SERPL-MCNC: 85 MG/DL (ref 0–149)
UROBILINOGEN UR STRIP.AUTO-MCNC: 0.2 MG/DL
WBC # BLD AUTO: 6.1 K/UL (ref 4.8–10.8)

## 2021-10-27 PROCEDURE — 85730 THROMBOPLASTIN TIME PARTIAL: CPT | Mod: GA

## 2021-10-27 PROCEDURE — 80061 LIPID PANEL: CPT

## 2021-10-27 PROCEDURE — G0475 HIV COMBINATION ASSAY: HCPCS | Mod: GA

## 2021-10-27 PROCEDURE — 80048 BASIC METABOLIC PNL TOTAL CA: CPT

## 2021-10-27 PROCEDURE — 85025 COMPLETE CBC W/AUTO DIFF WBC: CPT | Mod: GA

## 2021-10-27 PROCEDURE — 85610 PROTHROMBIN TIME: CPT | Mod: GA

## 2021-10-27 PROCEDURE — 81003 URINALYSIS AUTO W/O SCOPE: CPT

## 2021-10-27 PROCEDURE — 80053 COMPREHEN METABOLIC PANEL: CPT

## 2021-10-27 PROCEDURE — 36415 COLL VENOUS BLD VENIPUNCTURE: CPT | Mod: GA

## 2021-11-30 ENCOUNTER — APPOINTMENT (RX ONLY)
Dept: URBAN - METROPOLITAN AREA CLINIC 20 | Facility: CLINIC | Age: 66
Setting detail: DERMATOLOGY
End: 2021-11-30

## 2021-11-30 DIAGNOSIS — L81.4 OTHER MELANIN HYPERPIGMENTATION: ICD-10-CM

## 2021-11-30 DIAGNOSIS — Z71.89 OTHER SPECIFIED COUNSELING: ICD-10-CM

## 2021-11-30 DIAGNOSIS — L82.1 OTHER SEBORRHEIC KERATOSIS: ICD-10-CM

## 2021-11-30 DIAGNOSIS — Z85.828 PERSONAL HISTORY OF OTHER MALIGNANT NEOPLASM OF SKIN: ICD-10-CM

## 2021-11-30 DIAGNOSIS — D18.0 HEMANGIOMA: ICD-10-CM

## 2021-11-30 DIAGNOSIS — D22 MELANOCYTIC NEVI: ICD-10-CM

## 2021-11-30 PROBLEM — D22.39 MELANOCYTIC NEVI OF OTHER PARTS OF FACE: Status: ACTIVE | Noted: 2021-11-30

## 2021-11-30 PROBLEM — D18.01 HEMANGIOMA OF SKIN AND SUBCUTANEOUS TISSUE: Status: ACTIVE | Noted: 2021-11-30

## 2021-11-30 PROBLEM — D22.62 MELANOCYTIC NEVI OF LEFT UPPER LIMB, INCLUDING SHOULDER: Status: ACTIVE | Noted: 2021-11-30

## 2021-11-30 PROBLEM — D22.5 MELANOCYTIC NEVI OF TRUNK: Status: ACTIVE | Noted: 2021-11-30

## 2021-11-30 PROBLEM — D48.5 NEOPLASM OF UNCERTAIN BEHAVIOR OF SKIN: Status: ACTIVE | Noted: 2021-11-30

## 2021-11-30 PROBLEM — D22.61 MELANOCYTIC NEVI OF RIGHT UPPER LIMB, INCLUDING SHOULDER: Status: ACTIVE | Noted: 2021-11-30

## 2021-11-30 PROBLEM — D22.71 MELANOCYTIC NEVI OF RIGHT LOWER LIMB, INCLUDING HIP: Status: ACTIVE | Noted: 2021-11-30

## 2021-11-30 PROBLEM — D22.72 MELANOCYTIC NEVI OF LEFT LOWER LIMB, INCLUDING HIP: Status: ACTIVE | Noted: 2021-11-30

## 2021-11-30 PROCEDURE — ? COUNSELING

## 2021-11-30 PROCEDURE — ? OBSERVATION

## 2021-11-30 PROCEDURE — 11102 TANGNTL BX SKIN SINGLE LES: CPT

## 2021-11-30 PROCEDURE — ? SUNSCREEN RECOMMENDATIONS

## 2021-11-30 PROCEDURE — ? BIOPSY BY SHAVE METHOD

## 2021-11-30 PROCEDURE — 99213 OFFICE O/P EST LOW 20 MIN: CPT | Mod: 25

## 2021-11-30 PROCEDURE — ? ADDITIONAL NOTES

## 2021-11-30 ASSESSMENT — LOCATION ZONE DERM
LOCATION ZONE: TRUNK
LOCATION ZONE: FACE
LOCATION ZONE: LEG
LOCATION ZONE: ARM

## 2021-11-30 ASSESSMENT — LOCATION DETAILED DESCRIPTION DERM
LOCATION DETAILED: RIGHT INFERIOR MEDIAL UPPER BACK
LOCATION DETAILED: INFERIOR THORACIC SPINE
LOCATION DETAILED: LEFT DISTAL POSTERIOR THIGH
LOCATION DETAILED: RIGHT SUPERIOR UPPER BACK
LOCATION DETAILED: RIGHT DISTAL POSTERIOR THIGH
LOCATION DETAILED: RIGHT INFERIOR CENTRAL MALAR CHEEK
LOCATION DETAILED: LEFT PROXIMAL POSTERIOR UPPER ARM
LOCATION DETAILED: LEFT LATERAL PROXIMAL PRETIBIAL REGION
LOCATION DETAILED: RIGHT PROXIMAL PRETIBIAL REGION
LOCATION DETAILED: SUPERIOR THORACIC SPINE
LOCATION DETAILED: RIGHT INFERIOR FOREHEAD
LOCATION DETAILED: RIGHT DISTAL POSTERIOR UPPER ARM
LOCATION DETAILED: RIGHT VENTRAL PROXIMAL FOREARM
LOCATION DETAILED: RIGHT INFERIOR MEDIAL MIDBACK
LOCATION DETAILED: LEFT VENTRAL PROXIMAL FOREARM

## 2021-11-30 ASSESSMENT — LOCATION SIMPLE DESCRIPTION DERM
LOCATION SIMPLE: RIGHT POSTERIOR THIGH
LOCATION SIMPLE: LEFT POSTERIOR THIGH
LOCATION SIMPLE: RIGHT UPPER BACK
LOCATION SIMPLE: LEFT PRETIBIAL REGION
LOCATION SIMPLE: LEFT FOREARM
LOCATION SIMPLE: RIGHT POSTERIOR UPPER ARM
LOCATION SIMPLE: RIGHT PRETIBIAL REGION
LOCATION SIMPLE: RIGHT LOWER BACK
LOCATION SIMPLE: RIGHT FOREARM
LOCATION SIMPLE: UPPER BACK
LOCATION SIMPLE: LEFT POSTERIOR UPPER ARM
LOCATION SIMPLE: RIGHT FOREHEAD
LOCATION SIMPLE: RIGHT CHEEK

## 2021-11-30 NOTE — PROCEDURE: ADDITIONAL NOTES
Render Risk Assessment In Note?: no
Additional Notes: pt unsure if lesion is new
Detail Level: Detailed

## 2021-12-01 ENCOUNTER — HOSPITAL ENCOUNTER (OUTPATIENT)
Dept: RADIOLOGY | Facility: MEDICAL CENTER | Age: 66
End: 2021-12-01
Attending: INTERNAL MEDICINE
Payer: MEDICARE

## 2021-12-01 DIAGNOSIS — I72.9 ANEURYSMAL DILATATION (HCC): ICD-10-CM

## 2021-12-01 PROCEDURE — 700117 HCHG RX CONTRAST REV CODE 255: Performed by: INTERNAL MEDICINE

## 2021-12-01 PROCEDURE — 71275 CT ANGIOGRAPHY CHEST: CPT | Mod: MH

## 2021-12-01 RX ADMIN — IOHEXOL 100 ML: 350 INJECTION, SOLUTION INTRAVENOUS at 11:05

## 2021-12-13 ENCOUNTER — RX ONLY (OUTPATIENT)
Age: 66
Setting detail: RX ONLY
End: 2021-12-13

## 2021-12-13 RX ORDER — AMOXICILLIN 500 MG/1
TABLET, FILM COATED ORAL
Qty: 4 | Refills: 0 | Status: ERX | COMMUNITY
Start: 2021-12-13

## 2021-12-17 ENCOUNTER — APPOINTMENT (RX ONLY)
Dept: URBAN - METROPOLITAN AREA CLINIC 36 | Facility: CLINIC | Age: 66
Setting detail: DERMATOLOGY
End: 2021-12-17

## 2021-12-17 PROBLEM — C44.311 BASAL CELL CARCINOMA OF SKIN OF NOSE: Status: ACTIVE | Noted: 2021-12-17

## 2021-12-17 PROCEDURE — 14060 TIS TRNFR E/N/E/L 10 SQ CM/<: CPT

## 2021-12-17 PROCEDURE — 17311 MOHS 1 STAGE H/N/HF/G: CPT

## 2021-12-17 PROCEDURE — 17312 MOHS ADDL STAGE: CPT

## 2021-12-17 PROCEDURE — ? PRESCRIPTION

## 2021-12-17 PROCEDURE — ? MOHS SURGERY

## 2021-12-17 RX ORDER — CEPHALEXIN 500 MG/1
ONE TABLET ORAL TID
Qty: 30 | Refills: 0 | Status: ERX | COMMUNITY
Start: 2021-12-17

## 2021-12-17 RX ADMIN — CEPHALEXIN ONE: 500 TABLET ORAL at 00:00

## 2021-12-17 NOTE — PROCEDURE: MOHS SURGERY
Pain Refusal Text: I offered to prescribe pain medication but the patient refused to take this medication.
Donor Site Anesthesia Type: same as repair anesthesia
Undermining Location (Optional): in the superficial subcutaneous fat
Special Stains Stage 8 - Results: Base On Clearance Noted Above
M-Plasty Complex Repair Preamble Text (Leave Blank If You Do Not Want): Extensive wide undermining was performed.
Include Hemigard In Note?: No
Simple / Intermediate / Complex Repair - Final Wound Length In Cm: 0
Partial Purse String (Intermediate) Text: Given the location of the defect and the characteristics of the surrounding skin an intermediate purse string closure was deemed most appropriate.  Undermining was performed circumfirentially around the surgical defect.  A purse string suture was then placed and tightened. Wound tension only allowed a partial closure of the circular defect.
Display The Frozen Section And Histology As A Separate Paragraph: Yes
Body Location Override (Optional - Billing Will Still Be Based On Selected Body Map Location If Applicable): left nasal dorsum
Mid-Level Procedure Text (F): After obtaining clear surgical margins the patient was sent to a mid-level provider for surgical repair.  The patient understands they will receive post-surgical care and follow-up from the mid-level provider.
Cartilage Graft Text: The defect edges were debeveled with a #15 scalpel blade.  Given the location of the defect, shape of the defect, the fact the defect involved a full thickness cartilage defect a cartilage graft was deemed most appropriate.  An appropriate donor site was identified, cleansed, and anesthetized. The cartilage graft was then harvested and transferred to the recipient site, oriented appropriately and then sutured into place.  The secondary defect was then repaired using a primary closure.
Oculoplastic Surgeon Procedure Text (B): After obtaining clear surgical margins the patient was sent to oculoplastics for surgical repair.  The patient understands they will receive post-surgical care and follow-up from the referring physician's office.
Helical Rim Text: The closure involved the helical rim.
Home Suture Removal Text: Patient was provided instructions on removing sutures and will remove their sutures at home.  If they have any questions or difficulties they will call the office.
Nasalis-Muscle-Based Myocutaneous Island Pedicle Flap Text: Using a #15 blade, an incision was made around the donor flap to the level of the nasalis muscle. Wide lateral undermining was then performed in both the subcutaneous plane above the nasalis muscle, and in a submuscular plane just above periosteum. This allowed the formation of a free nasalis muscle axial pedicle (based on the angular artery) which was still attached to the actual cutaneous flap, increasing its mobility and vascular viability. Hemostasis was obtained with pinpoint electrocoagulation. The flap was mobilized into position and the pivotal anchor points positioned and stabilized with buried interrupted sutures. Subcutaneous and dermal tissues were closed in a multilayered fashion with sutures. Tissue redundancies were excised, and the epidermal edges were apposed without significant tension and sutured with sutures.
Bilobed Transposition Flap Text: The defect edges were debeveled with a #15 scalpel blade.  Given the location of the defect and the proximity to free margins a bilobed transposition flap was deemed most appropriate.  Using a sterile surgical marker, an appropriate bilobe flap drawn around the defect.    The area thus outlined was incised deep to adipose tissue with a #15 scalpel blade.  The skin margins were undermined to an appropriate distance in all directions utilizing iris scissors.
Double Island Pedicle Flap Text: The defect edges were debeveled with a #15 scalpel blade.  Given the location of the defect, shape of the defect and the proximity to free margins a double island pedicle advancement flap was deemed most appropriate.  Using a sterile surgical marker, an appropriate advancement flap was drawn incorporating the defect, outlining the appropriate donor tissue and placing the expected incisions within the relaxed skin tension lines where possible.    The area thus outlined was incised deep to adipose tissue with a #15 scalpel blade.  The skin margins were undermined to an appropriate distance in all directions around the primary defect and laterally outward around the island pedicle utilizing iris scissors.  There was minimal undermining beneath the pedicle flap.
Dressing (No Sutures): pressure dressing with telfa
Modified Advancement Flap Text: The defect edges were debeveled with a #15 scalpel blade.  Given the location of the defect, shape of the defect and the proximity to free margins a modified advancement flap was deemed most appropriate.  Using a sterile surgical marker, an appropriate advancement flap was drawn incorporating the defect and placing the expected incisions within the relaxed skin tension lines where possible.    The area thus outlined was incised deep to adipose tissue with a #15 scalpel blade.  The skin margins were undermined to an appropriate distance in all directions utilizing iris scissors.
Consent (Temporal Branch)/Introductory Paragraph: The rationale for Mohs was explained to the patient and consent was obtained. The risks, benefits and alternatives to therapy were discussed in detail. Specifically, the risks of damage to the temporal branch of the facial nerve, infection, scarring, bleeding, prolonged wound healing, incomplete removal, allergy to anesthesia, and recurrence were addressed. Prior to the procedure, the treatment site was clearly identified and confirmed by the patient. All components of Universal Protocol/PAUSE Rule completed.
Hemigard Retention Suture: 0-0 Nylon
Nostril Rim Text: The closure involved the nostril rim.
Suture Removal: 17 days
Closure 2 Information: This tab is for additional flaps and grafts, including complex repair and grafts and complex repair and flaps. You can also specify a different location for the additional defect, if the location is the same you do not need to select a new one. We will insert the automated text for the repair you select below just as we do for solitary flaps and grafts. Please note that at this time if you select a location with a different insurance zone you will need to override the ICD10 and CPT if appropriate.
Consent (Nose)/Introductory Paragraph: The rationale for Mohs was explained to the patient and consent was obtained. The risks, benefits and alternatives to therapy were discussed in detail. Specifically, the risks of nasal deformity, changes in the flow of air through the nose, infection, scarring, bleeding, prolonged wound healing, incomplete removal, allergy to anesthesia, nerve injury and recurrence were addressed. Prior to the procedure, the treatment site was clearly identified and confirmed by the patient. All components of Universal Protocol/PAUSE Rule completed.
Consent (Spinal Accessory)/Introductory Paragraph: The rationale for Mohs was explained to the patient and consent was obtained. The risks, benefits and alternatives to therapy were discussed in detail. Specifically, the risks of damage to the spinal accessory nerve, infection, scarring, bleeding, prolonged wound healing, incomplete removal, allergy to anesthesia, and recurrence were addressed. Prior to the procedure, the treatment site was clearly identified and confirmed by the patient. All components of Universal Protocol/PAUSE Rule completed.
Stage 3: Number Of Blocks?: 1
Asc Procedure Text (A): After obtaining clear surgical margins the patient was sent to an ASC for surgical repair.  The patient understands they will receive post-surgical care and follow-up from the ASC physician.
Double O-Z Flap Text: The defect edges were debeveled with a #15 scalpel blade.  Given the location of the defect, shape of the defect and the proximity to free margins a Double O-Z flap was deemed most appropriate.  Using a sterile surgical marker, an appropriate transposition flap was drawn incorporating the defect and placing the expected incisions within the relaxed skin tension lines where possible. The area thus outlined was incised deep to adipose tissue with a #15 scalpel blade.  The skin margins were undermined to an appropriate distance in all directions utilizing iris scissors.
Consent 2/Introductory Paragraph: Mohs surgery was explained to the patient and consent was obtained. The risks, benefits and alternatives to therapy were discussed in detail. Specifically, the risks of infection, scarring, bleeding, prolonged wound healing, incomplete removal, allergy to anesthesia, nerve injury and recurrence were addressed. Prior to the procedure, the treatment site was clearly identified and confirmed by the patient. All components of Universal Protocol/PAUSE Rule completed.
Graft Donor Site Bandage (Optional-Leave Blank If You Don't Want In Note): Aquaplast was fitted to the graft site and sewn into place. A pressure bandage were applied to the donor site and over the aquaplast bolster.
Location Indication Override (Is Already Calculated Based On Selected Body Location): Area H
Plastic Surgeon Procedure Text (A): After obtaining clear surgical margins the patient was sent to plastics for surgical repair.  The patient understands they will receive post-surgical care and follow-up from the referring physician's office.
Bcc Histology Text: There were numerous aggregates of basaloid cells.
O-T Plasty Text: The defect edges were debeveled with a #15 scalpel blade.  Given the location of the defect, shape of the defect and the proximity to free margins an O-T plasty was deemed most appropriate.  Using a sterile surgical marker, an appropriate O-T plasty was drawn incorporating the defect and placing the expected incisions within the relaxed skin tension lines where possible.    The area thus outlined was incised deep to adipose tissue with a #15 scalpel blade.  The skin margins were undermined to an appropriate distance in all directions utilizing iris scissors.
Peng Advancement Flap Text: The defect edges were debeveled with a #15 scalpel blade.  Given the location of the defect, shape of the defect and the proximity to free margins a Peng advancement flap was deemed most appropriate.  Using a sterile surgical marker, an appropriate advancement flap was drawn incorporating the defect and placing the expected incisions within the relaxed skin tension lines where possible. The area thus outlined was incised deep to adipose tissue with a #15 scalpel blade.  The skin margins were undermined to an appropriate distance in all directions utilizing iris scissors.
Referring Physician (Optional): JOCELYN Noonan
Deep Sutures: 5-0 Polysorb
Split-Thickness Skin Graft Text: The defect edges were debeveled with a #15 scalpel blade.  Given the location of the defect, shape of the defect and the proximity to free margins a split thickness skin graft was deemed most appropriate.  Using a sterile surgical marker, the primary defect shape was transferred to the donor site. The split thickness graft was then harvested.  The skin graft was then placed in the primary defect and oriented appropriately.
Consent 3/Introductory Paragraph: I gave the patient a chance to ask questions they had about the procedure.  Following this I explained the Mohs procedure and consent was obtained. The risks, benefits and alternatives to therapy were discussed in detail. Specifically, the risks of infection, scarring, bleeding, prolonged wound healing, incomplete removal, allergy to anesthesia, nerve injury and recurrence were addressed. Prior to the procedure, the treatment site was clearly identified and confirmed by the patient. All components of Universal Protocol/PAUSE Rule completed.
Was The Patient On Physician Recommended Anticoagulation Therapy?: Please Select the Appropriate Response
Mercedes Flap Text: The defect edges were debeveled with a #15 scalpel blade.  Given the location of the defect, shape of the defect and the proximity to free margins a Mercedes flap was deemed most appropriate.  Using a sterile surgical marker, an appropriate advancement flap was drawn incorporating the defect and placing the expected incisions within the relaxed skin tension lines where possible. The area thus outlined was incised deep to adipose tissue with a #15 scalpel blade.  The skin margins were undermined to an appropriate distance in all directions utilizing iris scissors.
Depth Of Tumor Invasion (For Histology): dermis
Crescentic Intermediate Repair Preamble Text (Leave Blank If You Do Not Want): Undermining was performed with blunt dissection.
Banner Transposition Flap Text: The defect edges were debeveled with a #15 scalpel blade.  Given the location of the defect and the proximity to free margins a Banner transposition flap was deemed most appropriate.  Using a sterile surgical marker, an appropriate flap drawn around the defect. The area thus outlined was incised deep to adipose tissue with a #15 scalpel blade.  The skin margins were undermined to an appropriate distance in all directions utilizing iris scissors.
Island Pedicle Flap-Requiring Vessel Identification Text: The defect edges were debeveled with a #15 scalpel blade.  Given the location of the defect, shape of the defect and the proximity to free margins an island pedicle advancement flap was deemed most appropriate.  Using a sterile surgical marker, an appropriate advancement flap was drawn, based on the axial vessel mentioned above, incorporating the defect, outlining the appropriate donor tissue and placing the expected incisions within the relaxed skin tension lines where possible.    The area thus outlined was incised deep to adipose tissue with a #15 scalpel blade.  The skin margins were undermined to an appropriate distance in all directions around the primary defect and laterally outward around the island pedicle utilizing iris scissors.  There was minimal undermining beneath the pedicle flap.
Advancement-Rotation Flap Text: The defect edges were debeveled with a #15 scalpel blade.  Given the location of the defect, shape of the defect and the proximity to free margins an advancement-rotation flap was deemed most appropriate.  Using a sterile surgical marker, an appropriate flap was drawn incorporating the defect and placing the expected incisions within the relaxed skin tension lines where possible. The area thus outlined was incised deep to adipose tissue with a #15 scalpel blade.  The skin margins were undermined to an appropriate distance in all directions utilizing iris scissors.
Stage 11: Additional Anesthesia Type: 1% lidocaine with epinephrine
Initial Size Of Lesion: 1.4
Advancement Flap (Double) Text: The defect edges were debeveled with a #15 scalpel blade.  Given the location of the defect and the proximity to free margins a double advancement flap was deemed most appropriate.  Using a sterile surgical marker, the appropriate advancement flaps were drawn incorporating the defect and placing the expected incisions within the relaxed skin tension lines where possible.    The area thus outlined was incised deep to adipose tissue with a #15 scalpel blade.  The skin margins were undermined to an appropriate distance in all directions utilizing iris scissors.
Bilateral Helical Rim Advancement Flap Text: The defect edges were debeveled with a #15 blade scalpel.  Given the location of the defect and the proximity to free margins (helical rim) a bilateral helical rim advancement flap was deemed most appropriate.  Using a sterile surgical marker, the appropriate advancement flaps were drawn incorporating the defect and placing the expected incisions between the helical rim and antihelix where possible.  The area thus outlined was incised through and through with a #15 scalpel blade.  With a skin hook and iris scissors, the flaps were gently and sharply undermined and freed up.
Undermining Type: Entire Wound
Information: Selecting Yes will display possible errors in your note based on the variables you have selected. This validation is only offered as a suggestion for you. PLEASE NOTE THAT THE VALIDATION TEXT WILL BE REMOVED WHEN YOU FINALIZE YOUR NOTE. IF YOU WANT TO FAX A PRELIMINARY NOTE YOU WILL NEED TO TOGGLE THIS TO 'NO' IF YOU DO NOT WANT IT IN YOUR FAXED NOTE.
Nasal Turnover Hinge Flap Text: The defect edges were debeveled with a #15 scalpel blade.  Given the size, depth, location of the defect and the defect being full thickness a nasal turnover hinge flap was deemed most appropriate.  Using a sterile surgical marker, an appropriate hinge flap was drawn incorporating the defect. The area thus outlined was incised with a #15 scalpel blade. The flap was designed to recreate the nasal mucosal lining and the alar rim. The skin margins were undermined to an appropriate distance in all directions utilizing iris scissors.
Mohs Histo Method Verbiage: Each section was then chromacoded and processed in the Mohs lab using the Mohs protocol and submitted for frozen section.
Area L Indication Text: Tumors in this location are included in Area L (trunk and extremities).  Mohs surgery is indicated for larger tumors, or tumors with aggressive histologic features, in these anatomic locations.
Mart-1 - Positive Histology Text: MART-1 staining demonstrates areas of higher density and clustering of melanocytes with Pagetoid spread upwards within the epidermis. The surgical margins are positive for tumor cells.
Brow Lift Text: A midfrontal incision was made medially to the defect to allow access to the tissues just superior to the left eyebrow. Following careful dissection inferiorly in a supraperiosteal plane to the level of the left eyebrow, several 3-0 monocryl sutures were used to resuspend the eyebrow orbicularis oculi muscular unit to the superior frontal bone periosteum. This resulted in an appropriate reapproximation of static eyebrow symmetry and correction of the left brow ptosis.
O-L Flap Text: The defect edges were debeveled with a #15 scalpel blade.  Given the location of the defect, shape of the defect and the proximity to free margins an O-L flap was deemed most appropriate.  Using a sterile surgical marker, an appropriate advancement flap was drawn incorporating the defect and placing the expected incisions within the relaxed skin tension lines where possible.    The area thus outlined was incised deep to adipose tissue with a #15 scalpel blade.  The skin margins were undermined to an appropriate distance in all directions utilizing iris scissors.
Closure 4 Information: This tab is for additional flaps and grafts above and beyond our usual structured repairs.  Please note if you enter information here it will not currently bill and you will need to add the billing information manually.
Cheek-To-Nose Interpolation Flap Text: A decision was made to reconstruct the defect utilizing an interpolation axial flap and a staged reconstruction.  A telfa template was made of the defect.  This telfa template was then used to outline the Cheek-To-Nose Interpolation flap.  The donor area for the pedicle flap was then injected with anesthesia.  The flap was excised through the skin and subcutaneous tissue down to the layer of the underlying musculature.  The interpolation flap was carefully excised within this deep plane to maintain its blood supply.  The edges of the donor site were undermined.   The donor site was closed in a primary fashion.  The pedicle was then rotated into position and sutured.  Once the tube was sutured into place, adequate blood supply was confirmed with blanching and refill.  The pedicle was then wrapped with xeroform gauze and dressed appropriately with a telfa and gauze bandage to ensure continued blood supply and protect the attached pedicle.
Staged Advancement Flap Text: The defect edges were debeveled with a #15 scalpel blade.  Given the location of the defect, shape of the defect and the proximity to free margins a staged advancement flap was deemed most appropriate.  Using a sterile surgical marker, an appropriate advancement flap was drawn incorporating the defect and placing the expected incisions within the relaxed skin tension lines where possible. The area thus outlined was incised deep to adipose tissue with a #15 scalpel blade.  The skin margins were undermined to an appropriate distance in all directions utilizing iris scissors.
Manual Repair Warning Statement: We plan on removing the manually selected variable below in favor of our much easier automatic structured text blocks found in the previous tab. We decided to do this to help make the flow better and give you the full power of structured data. Manual selection is never going to be ideal in our platform and I would encourage you to avoid using manual selection from this point on, especially since I will be sunsetting this feature. It is important that you do one of two things with the customized text below. First, you can save all of the text in a word file so you can have it for future reference. Second, transfer the text to the appropriate area in the Library tab. Lastly, if there is a flap or graft type which we do not have you need to let us know right away so I can add it in before the variable is hidden. No need to panic, we plan to give you roughly 6 months to make the change.
Flap Type: Advancement Flap (Single)
Area M Indication Text: Tumors in this location are included in Area M (cheek, forehead, scalp, neck, jawline and pretibial skin).  Mohs surgery is indicated for tumors in these anatomic locations.
Epidermal Closure Graft Donor Site (Optional): running
Advancement Flap (Single) Text: The defect edges were debeveled with a #15 scalpel blade.  Given the location of the defect and the proximity to free margins a single advancement flap was deemed most appropriate.  Using a sterile surgical marker, an appropriate advancement flap was drawn incorporating the defect and placing the expected incisions within the relaxed skin tension lines where possible.    The area thus outlined was incised deep to adipose tissue with a #15 scalpel blade.  The skin margins were undermined to an appropriate distance in all directions utilizing iris scissors.
Ftsg Text: The defect edges were debeveled with a #15 scalpel blade.  Given the location of the defect, shape of the defect and the proximity to free margins a full thickness skin graft was deemed most appropriate.  Using a sterile surgical marker, the primary defect shape was transferred to the donor site. The area thus outlined was incised deep to adipose tissue with a #15 scalpel blade.  The harvested graft was then trimmed of adipose tissue until only dermis and epidermis was left.  The skin margins of the secondary defect were undermined to an appropriate distance in all directions utilizing iris scissors.  The secondary defect was closed with interrupted buried subcutaneous sutures.  The skin edges were then re-apposed with running  sutures.  The skin graft was then placed in the primary defect and oriented appropriately.
Rhomboid Transposition Flap Text: The defect edges were debeveled with a #15 scalpel blade.  Given the location of the defect and the proximity to free margins a rhomboid transposition flap was deemed most appropriate.  Using a sterile surgical marker, an appropriate rhomboid flap was drawn incorporating the defect.    The area thus outlined was incised deep to adipose tissue with a #15 scalpel blade.  The skin margins were undermined to an appropriate distance in all directions utilizing iris scissors.
Staging Info: By selecting yes to the question above you will include information on AJCC 8 tumor staging in your Mohs note. Information on tumor staging will be automatically added for SCCs on the head and neck. AJCC 8 includes tumor size, tumor depth, perineural involvement and bone invasion.
O-Z Plasty Text: The defect edges were debeveled with a #15 scalpel blade.  Given the location of the defect, shape of the defect and the proximity to free margins an O-Z plasty (double transposition flap) was deemed most appropriate.  Using a sterile surgical marker, the appropriate transposition flaps were drawn incorporating the defect and placing the expected incisions within the relaxed skin tension lines where possible.    The area thus outlined was incised deep to adipose tissue with a #15 scalpel blade.  The skin margins were undermined to an appropriate distance in all directions utilizing iris scissors.  Hemostasis was achieved with electrocautery.  The flaps were then transposed into place, one clockwise and the other counterclockwise, and anchored with interrupted buried subcutaneous sutures.
Melolabial Transposition Flap Text: The defect edges were debeveled with a #15 scalpel blade.  Given the location of the defect and the proximity to free margins a melolabial flap was deemed most appropriate.  Using a sterile surgical marker, an appropriate melolabial transposition flap was drawn incorporating the defect.    The area thus outlined was incised deep to adipose tissue with a #15 scalpel blade.  The skin margins were undermined to an appropriate distance in all directions utilizing iris scissors.
Transposition Flap Text: The defect edges were debeveled with a #15 scalpel blade.  Given the location of the defect and the proximity to free margins a transposition flap was deemed most appropriate.  Using a sterile surgical marker, an appropriate transposition flap was drawn incorporating the defect.    The area thus outlined was incised deep to adipose tissue with a #15 scalpel blade.  The skin margins were undermined to an appropriate distance in all directions utilizing iris scissors.
Hatchet Flap Text: The defect edges were debeveled with a #15 scalpel blade.  Given the location of the defect, shape of the defect and the proximity to free margins a hatchet flap was deemed most appropriate.  Using a sterile surgical marker, an appropriate hatchet flap was drawn incorporating the defect and placing the expected incisions within the relaxed skin tension lines where possible.    The area thus outlined was incised deep to adipose tissue with a #15 scalpel blade.  The skin margins were undermined to an appropriate distance in all directions utilizing iris scissors.
Otolaryngologist Procedure Text (C): After obtaining clear surgical margins the patient was sent to otolaryngology for surgical repair.  The patient understands they will receive post-surgical care and follow-up from the referring physician's office.
Hemigard Postcare Instructions: The HEMIGARD strips are to remain completely dry for at least 5-7 days.
X Size Of Lesion In Cm (Optional): 1.1
Epidermal Sutures: 5-0 Surgipro
Chonodrocutaneous Helical Advancement Flap Text: The defect edges were debeveled with a #15 scalpel blade.  Given the location of the defect and the proximity to free margins a chondrocutaneous helical advancement flap was deemed most appropriate.  Using a sterile surgical marker, the appropriate advancement flap was drawn incorporating the defect and placing the expected incisions within the relaxed skin tension lines where possible.    The area thus outlined was incised deep to adipose tissue with a #15 scalpel blade.  The skin margins were undermined to an appropriate distance in all directions utilizing iris scissors.
Where Do You Want The Question To Include Opioid Counseling Located?: Case Summary Tab
Tissue Cultured Epidermal Autograft Text: The defect edges were debeveled with a #15 scalpel blade.  Given the location of the defect, shape of the defect and the proximity to free margins a tissue cultured epidermal autograft was deemed most appropriate.  The graft was then trimmed to fit the size of the defect.  The graft was then placed in the primary defect and oriented appropriately.
Keystone Flap Text: The defect edges were debeveled with a #15 scalpel blade.  Given the location of the defect, shape of the defect a keystone flap was deemed most appropriate.  Using a sterile surgical marker, an appropriate keystone flap was drawn incorporating the defect, outlining the appropriate donor tissue and placing the expected incisions within the relaxed skin tension lines where possible. The area thus outlined was incised deep to adipose tissue with a #15 scalpel blade.  The skin margins were undermined to an appropriate distance in all directions around the primary defect and laterally outward around the flap utilizing iris scissors.
Retention Suture Bite Size: 1 mm
Suturegard Body: The suture ends were repeatedly re-tightened and re-clamped to achieve the desired tissue expansion.
W Plasty Text: The lesion was extirpated to the level of the fat with a #15 scalpel blade.  Given the location of the defect, shape of the defect and the proximity to free margins a W-plasty was deemed most appropriate for repair.  Using a sterile surgical marker, the appropriate transposition arms of the W-plasty were drawn incorporating the defect and placing the expected incisions within the relaxed skin tension lines where possible.    The area thus outlined was incised deep to adipose tissue with a #15 scalpel blade.  The skin margins were undermined to an appropriate distance in all directions utilizing iris scissors.  The opposing transposition arms were then transposed into place in opposite direction and anchored with interrupted buried subcutaneous sutures.
Surgeon: Griffin Ordoñez MD
Debridement Text: The wound edges were debrided prior to proceeding with the closure to facilitate wound healing.
Surgical Defect Width In Cm (Optional): 1.0
Island Pedicle Flap With Canthal Suspension Text: The defect edges were debeveled with a #15 scalpel blade.  Given the location of the defect, shape of the defect and the proximity to free margins an island pedicle advancement flap was deemed most appropriate.  Using a sterile surgical marker, an appropriate advancement flap was drawn incorporating the defect, outlining the appropriate donor tissue and placing the expected incisions within the relaxed skin tension lines where possible. The area thus outlined was incised deep to adipose tissue with a #15 scalpel blade.  The skin margins were undermined to an appropriate distance in all directions around the primary defect and laterally outward around the island pedicle utilizing iris scissors.  There was minimal undermining beneath the pedicle flap. A suspension suture was placed in the canthal tendon to prevent tension and prevent ectropion.
Unna Boot Text: An Unna boot was placed to help immobilize the limb and facilitate more rapid healing.
Secondary Intention Text (Leave Blank If You Do Not Want): The defect will heal with secondary intention.
Mohs Rapid Report Verbiage: The area of clinically evident tumor was marked with skin marking ink and appropriately hatched.  The initial incision was made following the Mohs approach through the skin.  The specimen was taken to the lab, divided into the necessary number of pieces, chromacoded and processed according to the Mohs protocol.  This was repeated in successive stages until a tumor free defect was achieved.
Cheiloplasty (Complex) Text: A decision was made to reconstruct the defect with a  cheiloplasty.  The defect was undermined extensively.  Additional obicularis oris muscle was excised with a 15 blade scalpel.  The defect was converted into a full thickness wedge to facilite a better cosmetic result.  Small vessels were then tied off with 5-0 monocyrl. The obicularis oris, superficial fascia, adipose and dermis were then reapproximated.  After the deeper layers were approximated the epidermis was reapproximated with particular care given to realign the vermilion border.
Estimated Blood Loss (Cc): minimal
Provider Procedure Text (A): After obtaining clear surgical margins the defect was repaired by another provider.
Double O-Z Plasty Text: The defect edges were debeveled with a #15 scalpel blade.  Given the location of the defect, shape of the defect and the proximity to free margins a Double O-Z plasty (double transposition flap) was deemed most appropriate.  Using a sterile surgical marker, the appropriate transposition flaps were drawn incorporating the defect and placing the expected incisions within the relaxed skin tension lines where possible. The area thus outlined was incised deep to adipose tissue with a #15 scalpel blade.  The skin margins were undermined to an appropriate distance in all directions utilizing iris scissors.  Hemostasis was achieved with electrocautery.  The flaps were then transposed into place, one clockwise and the other counterclockwise, and anchored with interrupted buried subcutaneous sutures.
No Repair - Repaired With Adjacent Surgical Defect Text (Leave Blank If You Do Not Want): After obtaining clear surgical margins the defect was repaired concurrently with another surgical defect which was in close approximation.
Hemostasis: Electrocautery
Suturegard Intro: Intraoperative tissue expansion was performed, utilizing the SUTUREGARD device, in order to reduce wound tension.
Mauc Instructions: By selecting yes to the question below the MAUC number will be added into the note.  This will be calculated automatically based on the diagnosis chosen, the size entered, the body zone selected (H,M,L) and the specific indications you chose. You will also have the option to override the Mohs AUC if you disagree with the automatically calculated number and this option is found in the Case Summary tab.
Island Pedicle Flap Text: The defect edges were debeveled with a #15 scalpel blade.  Given the location of the defect, shape of the defect and the proximity to free margins an island pedicle advancement flap was deemed most appropriate.  Using a sterile surgical marker, an appropriate advancement flap was drawn incorporating the defect, outlining the appropriate donor tissue and placing the expected incisions within the relaxed skin tension lines where possible.    The area thus outlined was incised deep to adipose tissue with a #15 scalpel blade.  The skin margins were undermined to an appropriate distance in all directions around the primary defect and laterally outward around the island pedicle utilizing iris scissors.  There was minimal undermining beneath the pedicle flap.
Post-Care Instructions: I reviewed with the patient in detail post-care instructions. Patient is not to engage in any heavy lifting, exercise, or swimming for the next 14 days. Should the patient develop any fevers, chills, bleeding, severe pain patient will contact the office immediately.
Orbicularis Oris Muscle Flap Text: The defect edges were debeveled with a #15 scalpel blade.  Given that the defect affected the competency of the oral sphincter an orbicularis oris muscle flap was deemed most appropriate to restore this competency and normal muscle function.  Using a sterile surgical marker, an appropriate flap was drawn incorporating the defect. The area thus outlined was incised with a #15 scalpel blade.
Repair Hemostasis (Optional): Pinpoint electrocautery
Repair Type: Flap
Surgeon/Pathologist Verbiage (Will Incorporate Name Of Surgeon From Intro If Not Blank): operated in two distinct and integrated capacities as the surgeon and pathologist.
V-Y Flap Text: The defect edges were debeveled with a #15 scalpel blade.  Given the location of the defect, shape of the defect and the proximity to free margins a V-Y flap was deemed most appropriate.  Using a sterile surgical marker, an appropriate advancement flap was drawn incorporating the defect and placing the expected incisions within the relaxed skin tension lines where possible.    The area thus outlined was incised deep to adipose tissue with a #15 scalpel blade.  The skin margins were undermined to an appropriate distance in all directions utilizing iris scissors.
Xenograft Text: The defect edges were debeveled with a #15 scalpel blade.  Given the location of the defect, shape of the defect and the proximity to free margins a xenograft was deemed most appropriate.  The graft was then trimmed to fit the size of the defect.  The graft was then placed in the primary defect and oriented appropriately.
V-Y Plasty Text: The defect edges were debeveled with a #15 scalpel blade.  Given the location of the defect, shape of the defect and the proximity to free margins an V-Y advancement flap was deemed most appropriate.  Using a sterile surgical marker, an appropriate advancement flap was drawn incorporating the defect and placing the expected incisions within the relaxed skin tension lines where possible.    The area thus outlined was incised deep to adipose tissue with a #15 scalpel blade.  The skin margins were undermined to an appropriate distance in all directions utilizing iris scissors.
Mustarde Flap Text: The defect edges were debeveled with a #15 scalpel blade.  Given the size, depth and location of the defect and the proximity to free margins a Mustarde flap was deemed most appropriate.  Using a sterile surgical marker, an appropriate flap was drawn incorporating the defect. The area thus outlined was incised with a #15 scalpel blade.  The skin margins were undermined to an appropriate distance in all directions utilizing iris scissors.
Anesthesia Type: 1% lidocaine with epinephrine and 0.25% bupivacaine in a 2:3 ration buffered with 8.4% sodium bicarbonate
Ear Star Wedge Flap Text: The defect edges were debeveled with a #15 blade scalpel.  Given the location of the defect and the proximity to free margins (helical rim) an ear star wedge flap was deemed most appropriate.  Using a sterile surgical marker, the appropriate flap was drawn incorporating the defect and placing the expected incisions between the helical rim and antihelix where possible.  The area thus outlined was incised through and through with a #15 scalpel blade.
Mohs Method Verbiage: An incision at a 45 degree angle following the standard Mohs approach was done and the specimen was harvested as a microscopic controlled layer.
Partial Purse String (Simple) Text: Given the location of the defect and the characteristics of the surrounding skin a simple purse string closure was deemed most appropriate.  Undermining was performed circumfirentially around the surgical defect.  A purse string suture was then placed and tightened. Wound tension only allowed a partial closure of the circular defect.
Full Thickness Lip Wedge Repair (Flap) Text: Given the location of the defect and the proximity to free margins a full thickness wedge repair was deemed most appropriate.  Using a sterile surgical marker, the appropriate repair was drawn incorporating the defect and placing the expected incisions perpendicular to the vermilion border.  The vermilion border was also meticulously outlined to ensure appropriate reapproximation during the repair.  The area thus outlined was incised through and through with a #15 scalpel blade.  The muscularis and dermis were reaproximated with deep sutures following hemostasis. Care was taken to realign the vermilion border before proceeding with the superficial closure.  Once the vermilion was realigned the superfical and mucosal closure was finished.
Non-Graft Cartilage Fenestration Text: The cartilage was fenestrated with a 2mm punch biopsy to help facilitate healing.
Burow's Advancement Flap Text: The defect edges were debeveled with a #15 scalpel blade.  Given the location of the defect and the proximity to free margins a Burow's advancement flap was deemed most appropriate.  Using a sterile surgical marker, the appropriate advancement flap was drawn incorporating the defect and placing the expected incisions within the relaxed skin tension lines where possible.    The area thus outlined was incised deep to adipose tissue with a #15 scalpel blade.  The skin margins were undermined to an appropriate distance in all directions utilizing iris scissors.
Retention Suture Text: Retention sutures were placed to support the closure and prevent dehiscence.
Consent (Marginal Mandibular)/Introductory Paragraph: The rationale for Mohs was explained to the patient and consent was obtained. The risks, benefits and alternatives to therapy were discussed in detail. Specifically, the risks of damage to the marginal mandibular branch of the facial nerve, infection, scarring, bleeding, prolonged wound healing, incomplete removal, allergy to anesthesia, and recurrence were addressed. Prior to the procedure, the treatment site was clearly identified and confirmed by the patient. All components of Universal Protocol/PAUSE Rule completed.
Dorsal Nasal Flap Text: The defect edges were debeveled with a #15 scalpel blade.  Given the location of the defect and the proximity to free margins a dorsal nasal flap was deemed most appropriate.  Using a sterile surgical marker, an appropriate dorsal nasal flap was drawn around the defect.    The area thus outlined was incised deep to adipose tissue with a #15 scalpel blade.  The skin margins were undermined to an appropriate distance in all directions utilizing iris scissors.
Consent 1/Introductory Paragraph: The rationale for Mohs was explained to the patient and consent was obtained. The risks, benefits and alternatives to therapy were discussed in detail. Specifically, the risks of infection, scarring, bleeding, prolonged wound healing, incomplete removal, allergy to anesthesia, nerve injury and recurrence were addressed. Prior to the procedure, the treatment site was clearly identified and confirmed by the patient. All components of Universal Protocol/PAUSE Rule completed.
Skin Substitute Text: The defect edges were debeveled with a #15 scalpel blade.  Given the location of the defect, shape of the defect and the proximity to free margins a skin substitute graft was deemed most appropriate.  The graft material was trimmed to fit the size of the defect. The graft was then placed in the primary defect and oriented appropriately.
Composite Graft Text: The defect edges were debeveled with a #15 scalpel blade.  Given the location of the defect, shape of the defect, the proximity to free margins and the fact the defect was full thickness a composite graft was deemed most appropriate.  The defect was outline and then transferred to the donor site.  A full thickness graft was then excised from the donor site. The graft was then placed in the primary defect, oriented appropriately and then sutured into place.  The secondary defect was then repaired using a primary closure.
Date Of Previous Biopsy (Optional): 11/30/21
Graft Cartilage Fenestration Text: The cartilage was fenestrated with a 2mm punch biopsy to help facilitate graft survival and healing.
Helical Rim Advancement Flap Text: The defect edges were debeveled with a #15 blade scalpel.  Given the location of the defect and the proximity to free margins (helical rim) a double helical rim advancement flap was deemed most appropriate.  Using a sterile surgical marker, the appropriate advancement flaps were drawn incorporating the defect and placing the expected incisions between the helical rim and antihelix where possible.  The area thus outlined was incised through and through with a #15 scalpel blade.  With a skin hook and iris scissors, the flaps were gently and sharply undermined and freed up.
Tarsorrhaphy Text: A tarsorrhaphy was performed using Frost sutures.
Area H Indication Text: Tumors in this location are included in Area H (eyelids, eyebrows, nose, lips, chin, ear, pre-auricular, post-auricular, temple, genitalia, hands, feet, ankles and areola).  Tissue conservation is critical in these anatomic locations.
Bilobed Flap Text: The defect edges were debeveled with a #15 scalpel blade.  Given the location of the defect and the proximity to free margins a bilobe flap was deemed most appropriate.  Using a sterile surgical marker, an appropriate bilobe flap drawn around the defect.    The area thus outlined was incised deep to adipose tissue with a #15 scalpel blade.  The skin margins were undermined to an appropriate distance in all directions utilizing iris scissors.
H Plasty Text: Given the location of the defect, shape of the defect and the proximity to free margins a H-plasty was deemed most appropriate for repair.  Using a sterile surgical marker, the appropriate advancement arms of the H-plasty were drawn incorporating the defect and placing the expected incisions within the relaxed skin tension lines where possible. The area thus outlined was incised deep to adipose tissue with a #15 scalpel blade. The skin margins were undermined to an appropriate distance in all directions utilizing iris scissors.  The opposing advancement arms were then advanced into place in opposite direction and anchored with interrupted buried subcutaneous sutures.
Mohs Case Number: [id number]
Localized Dermabrasion With Wire Brush Text: The patient was draped in routine manner.  Localized dermabrasion using 3 x 17 mm wire brush was performed in routine manner to papillary dermis. This spot dermabrasion is being performed to complete skin cancer reconstruction. It also will eliminate the other sun damaged precancerous cells that are known to be part of the regional effect of a lifetime's worth of sun exposure. This localized dermabrasion is therapeutic and should not be considered cosmetic in any regard.
Bi-Rhombic Flap Text: The defect edges were debeveled with a #15 scalpel blade.  Given the location of the defect and the proximity to free margins a bi-rhombic flap was deemed most appropriate.  Using a sterile surgical marker, an appropriate rhombic flap was drawn incorporating the defect. The area thus outlined was incised deep to adipose tissue with a #15 scalpel blade.  The skin margins were undermined to an appropriate distance in all directions utilizing iris scissors.
Interpolation Flap Text: A decision was made to reconstruct the defect utilizing an interpolation axial flap and a staged reconstruction.  A telfa template was made of the defect.  This telfa template was then used to outline the interpolation flap.  The donor area for the pedicle flap was then injected with anesthesia.  The flap was excised through the skin and subcutaneous tissue down to the layer of the underlying musculature.  The interpolation flap was carefully excised within this deep plane to maintain its blood supply.  The edges of the donor site were undermined.   The donor site was closed in a primary fashion.  The pedicle was then rotated into position and sutured.  Once the tube was sutured into place, adequate blood supply was confirmed with blanching and refill.  The pedicle was then wrapped with xeroform gauze and dressed appropriately with a telfa and gauze bandage to ensure continued blood supply and protect the attached pedicle.
Mastoid Interpolation Flap Text: A decision was made to reconstruct the defect utilizing an interpolation axial flap and a staged reconstruction.  A telfa template was made of the defect.  This telfa template was then used to outline the mastoid interpolation flap.  The donor area for the pedicle flap was then injected with anesthesia.  The flap was excised through the skin and subcutaneous tissue down to the layer of the underlying musculature.  The pedicle flap was carefully excised within this deep plane to maintain its blood supply.  The edges of the donor site were undermined.   The donor site was closed in a primary fashion.  The pedicle was then rotated into position and sutured.  Once the tube was sutured into place, adequate blood supply was confirmed with blanching and refill.  The pedicle was then wrapped with xeroform gauze and dressed appropriately with a telfa and gauze bandage to ensure continued blood supply and protect the attached pedicle.
Mart-1 - Negative Histology Text: MART-1 staining demonstrates a normal density and pattern of melanocytes along the dermal-epidermal junction. The surgical margins are negative for tumor cells.
Consent Type: Consent 1 (Standard)
Dermal Autograft Text: The defect edges were debeveled with a #15 scalpel blade.  Given the location of the defect, shape of the defect and the proximity to free margins a dermal autograft was deemed most appropriate.  Using a sterile surgical marker, the primary defect shape was transferred to the donor site. The area thus outlined was incised deep to adipose tissue with a #15 scalpel blade.  The harvested graft was then trimmed of adipose and epidermal tissue until only dermis was left.  The skin graft was then placed in the primary defect and oriented appropriately.
Inflammation Suggestive Of Cancer Camouflage Histology Text: There was a dense lymphocytic infiltrate which prevented adequate histologic evaluation of adjacent structures.
Number Of Stages: 4
Cheiloplasty (Less Than 50%) Text: A decision was made to reconstruct the defect with a  cheiloplasty.  The defect was undermined extensively.  Additional obicularis oris muscle was excised with a 15 blade scalpel.  The defect was converted into a full thickness wedge, of less than 50% of the vertical height of the lip, to facilite a better cosmetic result.  Small vessels were then tied off with 5-0 monocyrl. The obicularis oris, superficial fascia, adipose and dermis were then reapproximated.  After the deeper layers were approximated the epidermis was reapproximated with particular care given to realign the vermilion border.
Bcc Infiltrative Histology Text: There were numerous aggregates of basaloid cells demonstrating an infiltrative pattern.
Complex Repair And Graft Additional Text (Will Appearing After The Standard Complex Repair Text): The complex repair was not sufficient to completely close the primary defect. The remaining additional defect was repaired with the graft mentioned below.
O-T Advancement Flap Text: The defect edges were debeveled with a #15 scalpel blade.  Given the location of the defect, shape of the defect and the proximity to free margins an O-T advancement flap was deemed most appropriate.  Using a sterile surgical marker, an appropriate advancement flap was drawn incorporating the defect and placing the expected incisions within the relaxed skin tension lines where possible.    The area thus outlined was incised deep to adipose tissue with a #15 scalpel blade.  The skin margins were undermined to an appropriate distance in all directions utilizing iris scissors.
Repair Anesthesia Method: local infiltration
Z Plasty Text: The lesion was extirpated to the level of the fat with a #15 scalpel blade.  Given the location of the defect, shape of the defect and the proximity to free margins a Z-plasty was deemed most appropriate for repair.  Using a sterile surgical marker, the appropriate transposition arms of the Z-plasty were drawn incorporating the defect and placing the expected incisions within the relaxed skin tension lines where possible.    The area thus outlined was incised deep to adipose tissue with a #15 scalpel blade.  The skin margins were undermined to an appropriate distance in all directions utilizing iris scissors.  The opposing transposition arms were then transposed into place in opposite direction and anchored with interrupted buried subcutaneous sutures.
Star Wedge Flap Text: The defect edges were debeveled with a #15 scalpel blade.  Given the location of the defect, shape of the defect and the proximity to free margins a star wedge flap was deemed most appropriate.  Using a sterile surgical marker, an appropriate rotation flap was drawn incorporating the defect and placing the expected incisions within the relaxed skin tension lines where possible. The area thus outlined was incised deep to adipose tissue with a #15 scalpel blade.  The skin margins were undermined to an appropriate distance in all directions utilizing iris scissors.
Posterior Auricular Interpolation Flap Text: A decision was made to reconstruct the defect utilizing an interpolation axial flap and a staged reconstruction.  A telfa template was made of the defect.  This telfa template was then used to outline the posterior auricular interpolation flap.  The donor area for the pedicle flap was then injected with anesthesia.  The flap was excised through the skin and subcutaneous tissue down to the layer of the underlying musculature.  The pedicle flap was carefully excised within this deep plane to maintain its blood supply.  The edges of the donor site were undermined.   The donor site was closed in a primary fashion.  The pedicle was then rotated into position and sutured.  Once the tube was sutured into place, adequate blood supply was confirmed with blanching and refill.  The pedicle was then wrapped with xeroform gauze and dressed appropriately with a telfa and gauze bandage to ensure continued blood supply and protect the attached pedicle.
Surgical Defect Length In Cm (Optional): 2.0
Medical Necessity Statement: Based on my medical judgement, Mohs surgery is the most appropriate treatment for this cancer compared to other treatments.
Consent (Scalp)/Introductory Paragraph: The rationale for Mohs was explained to the patient and consent was obtained. The risks, benefits and alternatives to therapy were discussed in detail. Specifically, the risks of changes in hair growth pattern secondary to repair, infection, scarring, bleeding, prolonged wound healing, incomplete removal, allergy to anesthesia, nerve injury and recurrence were addressed. Prior to the procedure, the treatment site was clearly identified and confirmed by the patient. All components of Universal Protocol/PAUSE Rule completed.
Ear Wedge Repair Text: A wedge excision was completed by carrying down an excision through the full thickness of the ear and cartilage with an inward facing Burow's triangle. The wound was then closed in a layered fashion.
Epidermal Autograft Text: The defect edges were debeveled with a #15 scalpel blade.  Given the location of the defect, shape of the defect and the proximity to free margins an epidermal autograft was deemed most appropriate.  Using a sterile surgical marker, the primary defect shape was transferred to the donor site. The epidermal graft was then harvested.  The skin graft was then placed in the primary defect and oriented appropriately.
Melolabial Interpolation Flap Text: A decision was made to reconstruct the defect utilizing an interpolation axial flap and a staged reconstruction.  A telfa template was made of the defect.  This telfa template was then used to outline the melolabial interpolation flap.  The donor area for the pedicle flap was then injected with anesthesia.  The flap was excised through the skin and subcutaneous tissue down to the layer of the underlying musculature.  The pedicle flap was carefully excised within this deep plane to maintain its blood supply.  The edges of the donor site were undermined.   The donor site was closed in a primary fashion.  The pedicle was then rotated into position and sutured.  Once the tube was sutured into place, adequate blood supply was confirmed with blanching and refill.  The pedicle was then wrapped with xeroform gauze and dressed appropriately with a telfa and gauze bandage to ensure continued blood supply and protect the attached pedicle.
Consent (Ear)/Introductory Paragraph: The rationale for Mohs was explained to the patient and consent was obtained. The risks, benefits and alternatives to therapy were discussed in detail. Specifically, the risks of ear deformity, infection, scarring, bleeding, prolonged wound healing, incomplete removal, allergy to anesthesia, nerve injury and recurrence were addressed. Prior to the procedure, the treatment site was clearly identified and confirmed by the patient. All components of Universal Protocol/PAUSE Rule completed.
Primary Defect Width In Cm (Final Defect Size - Required For Flaps/Grafts): 1.2
Secondary Defect Width In Cm (Required For Flaps): 3
Same Histology In Subsequent Stages Text: The pattern and morphology of the tumor is as described in the first stage.
Complex Repair And Flap Additional Text (Will Appearing After The Standard Complex Repair Text): The complex repair was not sufficient to completely close the primary defect. The remaining additional defect was repaired with the flap mentioned below.
No Residual Tumor Seen Histology Text: There were no malignant cells seen in the sections examined.
Trilobed Flap Text: The defect edges were debeveled with a #15 scalpel blade.  Given the location of the defect and the proximity to free margins a trilobed flap was deemed most appropriate.  Using a sterile surgical marker, an appropriate trilobed flap drawn around the defect.    The area thus outlined was incised deep to adipose tissue with a #15 scalpel blade.  The skin margins were undermined to an appropriate distance in all directions utilizing iris scissors.
Burow's Graft Text: The defect edges were debeveled with a #15 scalpel blade.  Given the location of the defect, shape of the defect, the proximity to free margins and the presence of a standing cone deformity a Burow's skin graft was deemed most appropriate. The standing cone was removed and this tissue was then trimmed to the shape of the primary defect. The adipose tissue was also removed until only dermis and epidermis were left.  The skin margins of the secondary defect were undermined to an appropriate distance in all directions utilizing iris scissors.  The secondary defect was closed with interrupted buried subcutaneous sutures.  The skin edges were then re-apposed with running  sutures.  The skin graft was then placed in the primary defect and oriented appropriately.
Consent (Near Eyelid Margin)/Introductory Paragraph: The rationale for Mohs was explained to the patient and consent was obtained. The risks, benefits and alternatives to therapy were discussed in detail. Specifically, the risks of ectropion or eyelid deformity, infection, scarring, bleeding, prolonged wound healing, incomplete removal, allergy to anesthesia, nerve injury and recurrence were addressed. Prior to the procedure, the treatment site was clearly identified and confirmed by the patient. All components of Universal Protocol/PAUSE Rule completed.
Wound Care: Vaseline
S Plasty Text: Given the location and shape of the defect, and the orientation of relaxed skin tension lines, an S-plasty was deemed most appropriate for repair.  Using a sterile surgical marker, the appropriate outline of the S-plasty was drawn, incorporating the defect and placing the expected incisions within the relaxed skin tension lines where possible.  The area thus outlined was incised deep to adipose tissue with a #15 scalpel blade.  The skin margins were undermined to an appropriate distance in all directions utilizing iris scissors. The skin flaps were advanced over the defect.  The opposing margins were then approximated with interrupted buried subcutaneous sutures.
Rhombic Flap Text: The defect edges were debeveled with a #15 scalpel blade.  Given the location of the defect and the proximity to free margins a rhombic flap was deemed most appropriate.  Using a sterile surgical marker, an appropriate rhombic flap was drawn incorporating the defect.    The area thus outlined was incised deep to adipose tissue with a #15 scalpel blade.  The skin margins were undermined to an appropriate distance in all directions utilizing iris scissors.
Primary Defect Length In Cm (Final Defect Size - Required For Flaps/Grafts): 2
O-Z Flap Text: The defect edges were debeveled with a #15 scalpel blade.  Given the location of the defect, shape of the defect and the proximity to free margins an O-Z flap was deemed most appropriate.  Using a sterile surgical marker, an appropriate transposition flap was drawn incorporating the defect and placing the expected incisions within the relaxed skin tension lines where possible. The area thus outlined was incised deep to adipose tissue with a #15 scalpel blade.  The skin margins were undermined to an appropriate distance in all directions utilizing iris scissors.
Wound Care (No Sutures): Petrolatum
Epidermal Closure: running and interrupted
Muscle Hinge Flap Text: The defect edges were debeveled with a #15 scalpel blade.  Given the size, depth and location of the defect and the proximity to free margins a muscle hinge flap was deemed most appropriate.  Using a sterile surgical marker, an appropriate hinge flap was drawn incorporating the defect. The area thus outlined was incised with a #15 scalpel blade.  The skin margins were undermined to an appropriate distance in all directions utilizing iris scissors.
Vermilion Border Text: The closure involved the vermilion border.
Tumor Depth: Less than 6mm from granular layer and no invasion beyond the subcutaneous fat
Cheek Interpolation Flap Text: A decision was made to reconstruct the defect utilizing an interpolation axial flap and a staged reconstruction.  A telfa template was made of the defect.  This telfa template was then used to outline the Cheek Interpolation flap.  The donor area for the pedicle flap was then injected with anesthesia.  The flap was excised through the skin and subcutaneous tissue down to the layer of the underlying musculature.  The interpolation flap was carefully excised within this deep plane to maintain its blood supply.  The edges of the donor site were undermined.   The donor site was closed in a primary fashion.  The pedicle was then rotated into position and sutured.  Once the tube was sutured into place, adequate blood supply was confirmed with blanching and refill.  The pedicle was then wrapped with xeroform gauze and dressed appropriately with a telfa and gauze bandage to ensure continued blood supply and protect the attached pedicle.
Mucosal Advancement Flap Text: Given the location of the defect, shape of the defect and the proximity to free margins a mucosal advancement flap was deemed most appropriate. Incisions were made with a 15 blade scalpel in the appropriate fashion along the cutaneous vermilion border and the mucosal lip. The remaining actinically damaged mucosal tissue was excised.  The mucosal advancement flap was then elevated to the gingival sulcus with care taken to preserve the neurovascular structures and advanced into the primary defect. Care was taken to ensure that precise realignment of the vermilion border was achieved.
Consent (Lip)/Introductory Paragraph: The rationale for Mohs was explained to the patient and consent was obtained. The risks, benefits and alternatives to therapy were discussed in detail. Specifically, the risks of lip deformity, changes in the oral aperture, infection, scarring, bleeding, prolonged wound healing, incomplete removal, allergy to anesthesia, nerve injury and recurrence were addressed. Prior to the procedure, the treatment site was clearly identified and confirmed by the patient. All components of Universal Protocol/PAUSE Rule completed.
Subsequent Stages Histo Method Verbiage: Using a similar technique to that described above, a thin layer of tissue was removed from all areas where tumor was visible on the previous stage.  The tissue was again oriented, mapped, dyed, and processed as above.
Zygomaticofacial Flap Text: Given the location of the defect, shape of the defect and the proximity to free margins a zygomaticofacial flap was deemed most appropriate for repair.  Using a sterile surgical marker, the appropriate flap was drawn incorporating the defect and placing the expected incisions within the relaxed skin tension lines where possible. The area thus outlined was incised deep to adipose tissue with a #15 scalpel blade with preservation of a vascular pedicle.  The skin margins were undermined to an appropriate distance in all directions utilizing iris scissors.  The flap was then placed into the defect and anchored with interrupted buried subcutaneous sutures.
Crescentic Advancement Flap Text: The defect edges were debeveled with a #15 scalpel blade.  Given the location of the defect and the proximity to free margins a crescentic advancement flap was deemed most appropriate.  Using a sterile surgical marker, the appropriate advancement flap was drawn incorporating the defect and placing the expected incisions within the relaxed skin tension lines where possible.    The area thus outlined was incised deep to adipose tissue with a #15 scalpel blade.  The skin margins were undermined to an appropriate distance in all directions utilizing iris scissors.
Detail Level: Detailed
Previous Accession (Optional): [id number]
Paramedian Forehead Flap Text: A decision was made to reconstruct the defect utilizing an interpolation axial flap and a staged reconstruction.  A telfa template was made of the defect.  This telfa template was then used to outline the paramedian forehead pedicle flap.  The donor area for the pedicle flap was then injected with anesthesia.  The flap was excised through the skin and subcutaneous tissue down to the layer of the underlying musculature.  The pedicle flap was carefully excised within this deep plane to maintain its blood supply.  The edges of the donor site were undermined.   The donor site was closed in a primary fashion.  The pedicle was then rotated into position and sutured.  Once the tube was sutured into place, adequate blood supply was confirmed with blanching and refill.  The pedicle was then wrapped with xeroform gauze and dressed appropriately with a telfa and gauze bandage to ensure continued blood supply and protect the attached pedicle.
Rotation Flap Text: The defect edges were debeveled with a #15 scalpel blade.  Given the location of the defect, shape of the defect and the proximity to free margins a rotation flap was deemed most appropriate.  Using a sterile surgical marker, an appropriate rotation flap was drawn incorporating the defect and placing the expected incisions within the relaxed skin tension lines where possible.    The area thus outlined was incised deep to adipose tissue with a #15 scalpel blade.  The skin margins were undermined to an appropriate distance in all directions utilizing iris scissors.
Postop Diagnosis: same
Purse String (Simple) Text: Given the location of the defect and the characteristics of the surrounding skin a purse string closure was deemed most appropriate.  Undermining was performed circumfirentially around the surgical defect.  A purse string suture was then placed and tightened.
Spiral Flap Text: The defect edges were debeveled with a #15 scalpel blade.  Given the location of the defect, shape of the defect and the proximity to free margins a spiral flap was deemed most appropriate.  Using a sterile surgical marker, an appropriate rotation flap was drawn incorporating the defect and placing the expected incisions within the relaxed skin tension lines where possible. The area thus outlined was incised deep to adipose tissue with a #15 scalpel blade.  The skin margins were undermined to an appropriate distance in all directions utilizing iris scissors.
A-T Advancement Flap Text: The defect edges were debeveled with a #15 scalpel blade.  Given the location of the defect, shape of the defect and the proximity to free margins an A-T advancement flap was deemed most appropriate.  Using a sterile surgical marker, an appropriate advancement flap was drawn incorporating the defect and placing the expected incisions within the relaxed skin tension lines where possible.    The area thus outlined was incised deep to adipose tissue with a #15 scalpel blade.  The skin margins were undermined to an appropriate distance in all directions utilizing iris scissors.
Alternatives Discussed Intro (Do Not Add Period): I discussed alternative treatments to Mohs surgery and specifically discussed the risks and benefits of
Purse String (Intermediate) Text: Given the location of the defect and the characteristics of the surrounding skin a purse string intermediate closure was deemed most appropriate.  Undermining was performed circumfirentially around the surgical defect.  A purse string suture was then placed and tightened.
Hemigard Intro: Due to skin fragility and wound tension, it was decided to use HEMIGARD adhesive retention suture devices to permit a linear closure. The skin was cleaned and dried for a 6cm distance away from the wound. Excessive hair, if present, was removed to allow for adhesion.
Alar Island Pedicle Flap Text: The defect edges were debeveled with a #15 scalpel blade.  Given the location of the defect, shape of the defect and the proximity to the alar rim an island pedicle advancement flap was deemed most appropriate.  Using a sterile surgical marker, an appropriate advancement flap was drawn incorporating the defect, outlining the appropriate donor tissue and placing the expected incisions within the nasal ala running parallel to the alar rim. The area thus outlined was incised with a #15 scalpel blade.  The skin margins were undermined minimally to an appropriate distance in all directions around the primary defect and laterally outward around the island pedicle utilizing iris scissors.  There was minimal undermining beneath the pedicle flap.
Eye Protection Verbiage: Before proceeding with the stage, a plastic scleral shield was inserted. The globe was anesthetized with a few drops of 1% lidocaine with 1:100,000 epinephrine. Then, an appropriate sized scleral shield was chosen and coated with lacrilube ointment. The shield was gently inserted and left in place for the duration of each stage. After the stage was completed, the shield was gently removed.
Adjacent Tissue Transfer Text: The defect edges were debeveled with a #15 scalpel blade.  Given the location of the defect and the proximity to free margins an adjacent tissue transfer was deemed most appropriate.  Using a sterile surgical marker, an appropriate flap was drawn incorporating the defect and placing the expected incisions within the relaxed skin tension lines where possible.    The area thus outlined was incised deep to adipose tissue with a #15 scalpel blade.  The skin margins were undermined to an appropriate distance in all directions utilizing iris scissors.

## 2021-12-17 NOTE — PROCEDURE: MIPS QUALITY
Quality 431: Preventive Care And Screening: Unhealthy Alcohol Use - Screening: Patient not identified as an unhealthy alcohol user when screened for unhealthy alcohol use using a systematic screening method
Detail Level: Detailed
Quality 111:Pneumonia Vaccination Status For Older Adults: Pneumococcal Vaccination Previously Received
Quality 226: Preventive Care And Screening: Tobacco Use: Screening And Cessation Intervention: Patient screened for tobacco use and is an ex/non-smoker
Quality 265: Biopsy Follow-Up: Biopsy results reviewed, communicated, tracked, and documented
Quality 130: Documentation Of Current Medications In The Medical Record: Current Medications Documented

## 2022-01-03 ENCOUNTER — APPOINTMENT (RX ONLY)
Dept: URBAN - METROPOLITAN AREA CLINIC 36 | Facility: CLINIC | Age: 67
Setting detail: DERMATOLOGY
End: 2022-01-03

## 2022-01-03 DIAGNOSIS — Z85.828 PERSONAL HISTORY OF OTHER MALIGNANT NEOPLASM OF SKIN: ICD-10-CM

## 2022-01-03 PROCEDURE — ? SUTURE REMOVAL (GLOBAL PERIOD)

## 2022-01-03 ASSESSMENT — LOCATION ZONE DERM: LOCATION ZONE: NOSE

## 2022-01-03 ASSESSMENT — LOCATION DETAILED DESCRIPTION DERM: LOCATION DETAILED: NASAL DORSUM

## 2022-01-03 ASSESSMENT — LOCATION SIMPLE DESCRIPTION DERM: LOCATION SIMPLE: NOSE

## 2022-01-03 NOTE — PROCEDURE: SUTURE REMOVAL (GLOBAL PERIOD)
Detail Level: Detailed
Add 41780 Cpt? (Important Note: In 2017 The Use Of 33199 Is Being Tracked By Cms To Determine Future Global Period Reimbursement For Global Periods): no

## 2022-05-31 ENCOUNTER — APPOINTMENT (RX ONLY)
Dept: URBAN - METROPOLITAN AREA CLINIC 20 | Facility: CLINIC | Age: 67
Setting detail: DERMATOLOGY
End: 2022-05-31

## 2022-05-31 DIAGNOSIS — Z71.89 OTHER SPECIFIED COUNSELING: ICD-10-CM

## 2022-05-31 DIAGNOSIS — Z85.828 PERSONAL HISTORY OF OTHER MALIGNANT NEOPLASM OF SKIN: ICD-10-CM

## 2022-05-31 DIAGNOSIS — D18.0 HEMANGIOMA: ICD-10-CM

## 2022-05-31 DIAGNOSIS — L71.8 OTHER ROSACEA: ICD-10-CM

## 2022-05-31 DIAGNOSIS — L82.1 OTHER SEBORRHEIC KERATOSIS: ICD-10-CM

## 2022-05-31 DIAGNOSIS — L11.1 TRANSIENT ACANTHOLYTIC DERMATOSIS [GROVER]: ICD-10-CM

## 2022-05-31 DIAGNOSIS — D22 MELANOCYTIC NEVI: ICD-10-CM

## 2022-05-31 DIAGNOSIS — L81.4 OTHER MELANIN HYPERPIGMENTATION: ICD-10-CM

## 2022-05-31 PROBLEM — D22.61 MELANOCYTIC NEVI OF RIGHT UPPER LIMB, INCLUDING SHOULDER: Status: ACTIVE | Noted: 2022-05-31

## 2022-05-31 PROBLEM — D22.5 MELANOCYTIC NEVI OF TRUNK: Status: ACTIVE | Noted: 2022-05-31

## 2022-05-31 PROBLEM — D23.71 OTHER BENIGN NEOPLASM OF SKIN OF RIGHT LOWER LIMB, INCLUDING HIP: Status: ACTIVE | Noted: 2022-05-31

## 2022-05-31 PROBLEM — D22.72 MELANOCYTIC NEVI OF LEFT LOWER LIMB, INCLUDING HIP: Status: ACTIVE | Noted: 2022-05-31

## 2022-05-31 PROBLEM — D22.39 MELANOCYTIC NEVI OF OTHER PARTS OF FACE: Status: ACTIVE | Noted: 2022-05-31

## 2022-05-31 PROBLEM — D18.01 HEMANGIOMA OF SKIN AND SUBCUTANEOUS TISSUE: Status: ACTIVE | Noted: 2022-05-31

## 2022-05-31 PROBLEM — D22.71 MELANOCYTIC NEVI OF RIGHT LOWER LIMB, INCLUDING HIP: Status: ACTIVE | Noted: 2022-05-31

## 2022-05-31 PROBLEM — D22.62 MELANOCYTIC NEVI OF LEFT UPPER LIMB, INCLUDING SHOULDER: Status: ACTIVE | Noted: 2022-05-31

## 2022-05-31 PROCEDURE — ? SUNSCREEN RECOMMENDATIONS

## 2022-05-31 PROCEDURE — 99213 OFFICE O/P EST LOW 20 MIN: CPT

## 2022-05-31 PROCEDURE — ? ADDITIONAL NOTES

## 2022-05-31 PROCEDURE — ? COUNSELING

## 2022-05-31 PROCEDURE — ? OBSERVATION

## 2022-05-31 ASSESSMENT — LOCATION DETAILED DESCRIPTION DERM
LOCATION DETAILED: RIGHT SUPERIOR UPPER BACK
LOCATION DETAILED: RIGHT INFERIOR MEDIAL UPPER BACK
LOCATION DETAILED: LEFT INFERIOR CENTRAL MALAR CHEEK
LOCATION DETAILED: RIGHT PROXIMAL PRETIBIAL REGION
LOCATION DETAILED: RIGHT DISTAL POSTERIOR UPPER ARM
LOCATION DETAILED: INFERIOR THORACIC SPINE
LOCATION DETAILED: SUPERIOR THORACIC SPINE
LOCATION DETAILED: RIGHT INFERIOR MEDIAL MIDBACK
LOCATION DETAILED: RIGHT VENTRAL PROXIMAL FOREARM
LOCATION DETAILED: RIGHT DISTAL POSTERIOR THIGH
LOCATION DETAILED: LEFT PROXIMAL POSTERIOR UPPER ARM
LOCATION DETAILED: NASAL DORSUM
LOCATION DETAILED: RIGHT INFERIOR FOREHEAD
LOCATION DETAILED: LEFT LATERAL PROXIMAL PRETIBIAL REGION
LOCATION DETAILED: RIGHT INFERIOR CENTRAL MALAR CHEEK
LOCATION DETAILED: EPIGASTRIC SKIN
LOCATION DETAILED: LEFT DISTAL POSTERIOR THIGH
LOCATION DETAILED: LEFT VENTRAL PROXIMAL FOREARM

## 2022-05-31 ASSESSMENT — LOCATION ZONE DERM
LOCATION ZONE: LEG
LOCATION ZONE: FACE
LOCATION ZONE: ARM
LOCATION ZONE: NOSE
LOCATION ZONE: TRUNK

## 2022-05-31 ASSESSMENT — LOCATION SIMPLE DESCRIPTION DERM
LOCATION SIMPLE: RIGHT CHEEK
LOCATION SIMPLE: RIGHT POSTERIOR UPPER ARM
LOCATION SIMPLE: RIGHT PRETIBIAL REGION
LOCATION SIMPLE: NOSE
LOCATION SIMPLE: RIGHT POSTERIOR THIGH
LOCATION SIMPLE: RIGHT FOREARM
LOCATION SIMPLE: LEFT POSTERIOR THIGH
LOCATION SIMPLE: LEFT POSTERIOR UPPER ARM
LOCATION SIMPLE: RIGHT FOREHEAD
LOCATION SIMPLE: RIGHT LOWER BACK
LOCATION SIMPLE: ABDOMEN
LOCATION SIMPLE: LEFT CHEEK
LOCATION SIMPLE: LEFT FOREARM
LOCATION SIMPLE: UPPER BACK
LOCATION SIMPLE: LEFT PRETIBIAL REGION
LOCATION SIMPLE: RIGHT UPPER BACK

## 2022-05-31 NOTE — PROCEDURE: ADDITIONAL NOTES
Additional Notes: Pt counseled in otc moisturizers with green bases. Consider laser cosmetic treatment in the fall.
Render Risk Assessment In Note?: no
Detail Level: Detailed

## 2022-10-27 ENCOUNTER — OFFICE VISIT (OUTPATIENT)
Dept: CARDIOLOGY | Facility: MEDICAL CENTER | Age: 67
End: 2022-10-27
Payer: MEDICARE

## 2022-10-27 VITALS
HEART RATE: 68 BPM | SYSTOLIC BLOOD PRESSURE: 124 MMHG | OXYGEN SATURATION: 98 % | HEIGHT: 76 IN | RESPIRATION RATE: 14 BRPM | DIASTOLIC BLOOD PRESSURE: 66 MMHG | WEIGHT: 242 LBS | BODY MASS INDEX: 29.47 KG/M2

## 2022-10-27 DIAGNOSIS — E78.5 DYSLIPIDEMIA: ICD-10-CM

## 2022-10-27 DIAGNOSIS — I77.810 ASCENDING AORTA DILATATION (HCC): ICD-10-CM

## 2022-10-27 DIAGNOSIS — R42 LIGHTHEADEDNESS: ICD-10-CM

## 2022-10-27 DIAGNOSIS — I10 PRIMARY HYPERTENSION: ICD-10-CM

## 2022-10-27 PROCEDURE — 99214 OFFICE O/P EST MOD 30 MIN: CPT | Performed by: INTERNAL MEDICINE

## 2022-10-27 RX ORDER — ATORVASTATIN CALCIUM 20 MG/1
20 TABLET, FILM COATED ORAL
Qty: 90 TABLET | Refills: 4 | Status: SHIPPED | OUTPATIENT
Start: 2022-10-27 | End: 2024-01-24

## 2022-10-27 RX ORDER — METOPROLOL SUCCINATE 50 MG/1
50 TABLET, EXTENDED RELEASE ORAL DAILY
Qty: 90 TABLET | Refills: 4 | Status: SHIPPED | OUTPATIENT
Start: 2022-10-27 | End: 2023-10-30

## 2022-10-27 RX ORDER — METOPROLOL SUCCINATE 25 MG/1
25 TABLET, EXTENDED RELEASE ORAL DAILY
Qty: 90 TABLET | Refills: 4 | Status: SHIPPED | OUTPATIENT
Start: 2022-10-27 | End: 2022-10-27

## 2022-10-27 ASSESSMENT — ENCOUNTER SYMPTOMS
CLAUDICATION: 0
VOMITING: 0
BRUISES/BLEEDS EASILY: 0
HEADACHES: 0
DIZZINESS: 1
NAUSEA: 0
FALLS: 0
BLOOD IN STOOL: 0
MYALGIAS: 0
DEPRESSION: 0
EYE DISCHARGE: 0
HALLUCINATIONS: 0
SPEECH CHANGE: 0
WEIGHT LOSS: 0
PND: 0
EYE PAIN: 0
COUGH: 0
ABDOMINAL PAIN: 0
LOSS OF CONSCIOUSNESS: 0
ORTHOPNEA: 0
SHORTNESS OF BREATH: 0
SENSORY CHANGE: 0
BLURRED VISION: 0
FEVER: 0
DOUBLE VISION: 0
CHILLS: 0
PALPITATIONS: 0

## 2022-10-27 ASSESSMENT — FIBROSIS 4 INDEX: FIB4 SCORE: 1.5

## 2022-10-27 NOTE — PROGRESS NOTES
Chief Complaint   Patient presents with    Dyslipidemia       Subjective:   Tyler Live is a 67 y.o. male who presents today for evaluation of abnormal echocardiogram showing LVEF of 50% (which might be underestimated due to image quality).     No family history of sudden cardiac death.    Does have dilated ascending aorta of 4.4 cm.    01/2021 Had right hip surgery and BP dropped 1 day post op.    I have independently interpreted and reviewed echocardiogram's actual images with patient which showed normal left ventricular systolic function. No wall motion abnormality. No evidence of pulmonary hypertension. No significant valvular disease.    In the interim, patient has been doing well without having any symptoms. Patient denies having chest pain, dyspnea, palpitation, presyncope, syncope episodes. Able to climb up at least 2 flights of stairs.    He did have one episode of lightheadedness while exercising recently. He is very concerned about having a heart attack.      History reviewed. No pertinent past medical history.  Past Surgical History:   Procedure Laterality Date    UT TOTAL HIP ARTHROPLASTY Left 11/2/2021    Procedure: ARTHROPLASTY, HIP, TOTAL, ANTERIOR APPROACH;  Surgeon: Costa Pemberton M.D.;  Location: [facility];  Service: Orthopedics    CATARACT EXTRACTION WITH IOL Bilateral     COLONOSCOPY  age 50    neg    EYE SURGERY  age 4    R eye,trauma     Family History   Problem Relation Age of Onset    Cancer Father         melanoma    GI Disease Mother     Cancer Sister         breast    Cancer Maternal Grandmother         kidney    Cancer Maternal Grandfather         prostate    Cancer Paternal Grandmother         liver    Cancer Paternal Grandfather         throat    Diabetes Neg Hx     Heart Disease Neg Hx     Stroke Neg Hx      Social History     Socioeconomic History    Marital status:      Spouse name: Not on file    Number of children: Not on file    Years  of education: Not on file    Highest education level: Not on file   Occupational History    Not on file   Tobacco Use    Smoking status: Never    Smokeless tobacco: Never   Vaping Use    Vaping Use: Never used   Substance and Sexual Activity    Alcohol use: Not Currently     Comment: rare    Drug use: No    Sexual activity: Yes     Partners: Female     Comment: ,  [employer]   Other Topics Concern     Service No    Blood Transfusions No    Caffeine Concern No    Occupational Exposure No    Hobby Hazards No    Sleep Concern Yes    Stress Concern No    Weight Concern No    Special Diet No    Back Care No    Exercise Yes    Bike Helmet No    Seat Belt Yes    Self-Exams Yes   Social History Narrative    Not on file     Social Determinants of Health     Financial Resource Strain: Not on file   Food Insecurity: Not on file   Transportation Needs: Not on file   Physical Activity: Not on file   Stress: Not on file   Social Connections: Not on file   Intimate Partner Violence: Not on file   Housing Stability: Not on file     No Known Allergies  Outpatient Encounter Medications as of 10/27/2022   Medication Sig Dispense Refill    atorvastatin (LIPITOR) 20 MG Tab Take 1 Tablet by mouth every day. N THE EVENING 90 Tablet 4    metoprolol SR (TOPROL XL) 25 MG TABLET SR 24 HR Take 1 Tablet by mouth every day. 90 Tablet 4    latanoprost (XALATAN) 0.005 % Solution       [DISCONTINUED] metoprolol SR (TOPROL XL) 25 MG TABLET SR 24 HR       [DISCONTINUED] aspirin 81 MG EC tablet 81 mg.      [DISCONTINUED] LUMIGAN 0.01 % Solution       [DISCONTINUED] HYDROcodone-acetaminophen (NORCO) 5-325 MG Tab per tablet       [DISCONTINUED] omeprazole (PRILOSEC) 20 MG delayed-release capsule       [DISCONTINUED] atorvastatin (LIPITOR) 20 MG Tab Take 1 tablet by mouth every day. N THE EVENING 90 tablet 2     No facility-administered encounter medications on file as of 10/27/2022.     Review of Systems   Constitutional:   "Negative for chills, fever, malaise/fatigue and weight loss.   HENT:  Negative for ear discharge, ear pain, hearing loss and nosebleeds.    Eyes:  Negative for blurred vision, double vision, pain and discharge.   Respiratory:  Negative for cough and shortness of breath.    Cardiovascular:  Negative for chest pain, palpitations, orthopnea, claudication, leg swelling and PND.   Gastrointestinal:  Negative for abdominal pain, blood in stool, melena, nausea and vomiting.   Genitourinary:  Negative for dysuria and hematuria.   Musculoskeletal:  Negative for falls, joint pain and myalgias.   Skin:  Negative for itching and rash.   Neurological:  Positive for dizziness. Negative for sensory change, speech change, loss of consciousness and headaches.   Endo/Heme/Allergies:  Negative for environmental allergies. Does not bruise/bleed easily.   Psychiatric/Behavioral:  Negative for depression, hallucinations and suicidal ideas.       Objective:   /66 (BP Location: Left arm, Patient Position: Sitting, BP Cuff Size: Adult)   Pulse 68   Resp 14   Ht 1.93 m (6' 4\")   Wt 110 kg (242 lb)   SpO2 98%   BMI 29.46 kg/m²     Physical Exam  Vitals and nursing note reviewed.   Constitutional:       General: He is not in acute distress.     Appearance: He is not diaphoretic.   HENT:      Head: Normocephalic and atraumatic.      Right Ear: External ear normal.      Left Ear: External ear normal.   Eyes:      General:         Right eye: No discharge.         Left eye: No discharge.   Neck:      Thyroid: No thyromegaly.      Vascular: No JVD.   Cardiovascular:      Rate and Rhythm: Normal rate and regular rhythm.      Heart sounds: Normal heart sounds. No murmur heard.    No friction rub. No gallop.   Pulmonary:      Effort: No respiratory distress.      Breath sounds: Normal breath sounds.   Abdominal:      General: Bowel sounds are normal. There is no distension.      Tenderness: There is no abdominal tenderness. "   Musculoskeletal:         General: No tenderness.   Skin:     General: Skin is warm and dry.   Neurological:      Mental Status: He is alert and oriented to person, place, and time.      Cranial Nerves: No cranial nerve deficit.   Psychiatric:         Behavior: Behavior normal.       Assessment:     1. Lightheadedness  EC-ECHOCARDIOGRAM REST/STRESS W/O CONT      2. Ascending aorta dilatation (HCC)  EC-ECHOCARDIOGRAM COMPLETE W/O CONT    metoprolol SR (TOPROL XL) 25 MG TABLET SR 24 HR    EC-ECHOCARDIOGRAM REST/STRESS W/O CONT      3. Dyslipidemia  atorvastatin (LIPITOR) 20 MG Tab    EC-ECHOCARDIOGRAM REST/STRESS W/O CONT      4. Primary hypertension  metoprolol SR (TOPROL XL) 25 MG TABLET SR 24 HR    EC-ECHOCARDIOGRAM REST/STRESS W/O CONT          Medical Decision Making:  Today's Assessment / Status / Plan:   At this time, to further risk stratify, I will order a transthoracic echocardiogram to assess cardiac and valvular functions. I will also order an echocardiogram stress test (to avoid radiation exposure in young patients) to assess for coronary ischemia.    Clinical monitor for ascending aorta dilation.     Blood pressure is well controlled. But will increase Toprol XL to 50 mg daily for better control of big swing of HTN.    Will refer to sleep medicine.

## 2022-11-02 ENCOUNTER — HOSPITAL ENCOUNTER (OUTPATIENT)
Dept: LAB | Facility: MEDICAL CENTER | Age: 67
End: 2022-11-02
Attending: INTERNAL MEDICINE
Payer: MEDICARE

## 2022-11-02 LAB
ALBUMIN SERPL BCP-MCNC: 4.6 G/DL (ref 3.2–4.9)
ALBUMIN/GLOB SERPL: 1.9 G/DL
ALP SERPL-CCNC: 64 U/L (ref 30–99)
ALT SERPL-CCNC: 27 U/L (ref 2–50)
ANION GAP SERPL CALC-SCNC: 9 MMOL/L (ref 7–16)
AST SERPL-CCNC: 28 U/L (ref 12–45)
BILIRUB SERPL-MCNC: 1.2 MG/DL (ref 0.1–1.5)
BUN SERPL-MCNC: 11 MG/DL (ref 8–22)
CALCIUM SERPL-MCNC: 9.4 MG/DL (ref 8.5–10.5)
CHLORIDE SERPL-SCNC: 104 MMOL/L (ref 96–112)
CHOLEST SERPL-MCNC: 110 MG/DL (ref 100–199)
CO2 SERPL-SCNC: 28 MMOL/L (ref 20–33)
CREAT SERPL-MCNC: 0.92 MG/DL (ref 0.5–1.4)
EST. AVERAGE GLUCOSE BLD GHB EST-MCNC: 103 MG/DL
FASTING STATUS PATIENT QL REPORTED: NORMAL
GFR SERPLBLD CREATININE-BSD FMLA CKD-EPI: 91 ML/MIN/1.73 M 2
GLOBULIN SER CALC-MCNC: 2.4 G/DL (ref 1.9–3.5)
GLUCOSE SERPL-MCNC: 101 MG/DL (ref 65–99)
HBA1C MFR BLD: 5.2 % (ref 4–5.6)
HDLC SERPL-MCNC: 35 MG/DL
LDLC SERPL CALC-MCNC: 54 MG/DL
POTASSIUM SERPL-SCNC: 4.2 MMOL/L (ref 3.6–5.5)
PROT SERPL-MCNC: 7 G/DL (ref 6–8.2)
SODIUM SERPL-SCNC: 141 MMOL/L (ref 135–145)
TRIGL SERPL-MCNC: 104 MG/DL (ref 0–149)

## 2022-11-02 PROCEDURE — 80061 LIPID PANEL: CPT

## 2022-11-02 PROCEDURE — 83036 HEMOGLOBIN GLYCOSYLATED A1C: CPT

## 2022-11-02 PROCEDURE — 36415 COLL VENOUS BLD VENIPUNCTURE: CPT

## 2022-11-02 PROCEDURE — 80053 COMPREHEN METABOLIC PANEL: CPT

## 2022-11-04 ENCOUNTER — PATIENT MESSAGE (OUTPATIENT)
Dept: HEALTH INFORMATION MANAGEMENT | Facility: OTHER | Age: 67
End: 2022-11-04

## 2022-11-29 ENCOUNTER — APPOINTMENT (RX ONLY)
Dept: URBAN - METROPOLITAN AREA CLINIC 20 | Facility: CLINIC | Age: 67
Setting detail: DERMATOLOGY
End: 2022-11-29

## 2022-11-29 DIAGNOSIS — D22 MELANOCYTIC NEVI: ICD-10-CM

## 2022-11-29 DIAGNOSIS — L85.3 XEROSIS CUTIS: ICD-10-CM

## 2022-11-29 DIAGNOSIS — D18.0 HEMANGIOMA: ICD-10-CM

## 2022-11-29 DIAGNOSIS — L73.8 OTHER SPECIFIED FOLLICULAR DISORDERS: ICD-10-CM

## 2022-11-29 DIAGNOSIS — L11.1 TRANSIENT ACANTHOLYTIC DERMATOSIS [GROVER]: ICD-10-CM

## 2022-11-29 DIAGNOSIS — Z85.828 PERSONAL HISTORY OF OTHER MALIGNANT NEOPLASM OF SKIN: ICD-10-CM

## 2022-11-29 DIAGNOSIS — L81.4 OTHER MELANIN HYPERPIGMENTATION: ICD-10-CM

## 2022-11-29 DIAGNOSIS — L57.0 ACTINIC KERATOSIS: ICD-10-CM

## 2022-11-29 DIAGNOSIS — Z71.89 OTHER SPECIFIED COUNSELING: ICD-10-CM

## 2022-11-29 DIAGNOSIS — L82.1 OTHER SEBORRHEIC KERATOSIS: ICD-10-CM

## 2022-11-29 PROBLEM — D22.62 MELANOCYTIC NEVI OF LEFT UPPER LIMB, INCLUDING SHOULDER: Status: ACTIVE | Noted: 2022-11-29

## 2022-11-29 PROBLEM — D18.01 HEMANGIOMA OF SKIN AND SUBCUTANEOUS TISSUE: Status: ACTIVE | Noted: 2022-11-29

## 2022-11-29 PROBLEM — D22.39 MELANOCYTIC NEVI OF OTHER PARTS OF FACE: Status: ACTIVE | Noted: 2022-11-29

## 2022-11-29 PROBLEM — D22.61 MELANOCYTIC NEVI OF RIGHT UPPER LIMB, INCLUDING SHOULDER: Status: ACTIVE | Noted: 2022-11-29

## 2022-11-29 PROBLEM — D22.71 MELANOCYTIC NEVI OF RIGHT LOWER LIMB, INCLUDING HIP: Status: ACTIVE | Noted: 2022-11-29

## 2022-11-29 PROBLEM — D22.72 MELANOCYTIC NEVI OF LEFT LOWER LIMB, INCLUDING HIP: Status: ACTIVE | Noted: 2022-11-29

## 2022-11-29 PROBLEM — D22.5 MELANOCYTIC NEVI OF TRUNK: Status: ACTIVE | Noted: 2022-11-29

## 2022-11-29 PROCEDURE — 99213 OFFICE O/P EST LOW 20 MIN: CPT | Mod: 25

## 2022-11-29 PROCEDURE — ? SUNSCREEN RECOMMENDATIONS

## 2022-11-29 PROCEDURE — 17000 DESTRUCT PREMALG LESION: CPT

## 2022-11-29 PROCEDURE — ? COUNSELING

## 2022-11-29 PROCEDURE — ? LIQUID NITROGEN

## 2022-11-29 PROCEDURE — 17003 DESTRUCT PREMALG LES 2-14: CPT

## 2022-11-29 PROCEDURE — ? OBSERVATION

## 2022-11-29 ASSESSMENT — LOCATION DETAILED DESCRIPTION DERM
LOCATION DETAILED: RIGHT INFERIOR MEDIAL MIDBACK
LOCATION DETAILED: RIGHT INFERIOR FOREHEAD
LOCATION DETAILED: LEFT SUPERIOR PARIETAL SCALP
LOCATION DETAILED: LEFT MEDIAL PLANTAR MIDFOOT
LOCATION DETAILED: INFERIOR THORACIC SPINE
LOCATION DETAILED: LEFT DISTAL POSTERIOR THIGH
LOCATION DETAILED: RIGHT DISTAL POSTERIOR THIGH
LOCATION DETAILED: EPIGASTRIC SKIN
LOCATION DETAILED: NASAL DORSUM
LOCATION DETAILED: LEFT PROXIMAL POSTERIOR UPPER ARM
LOCATION DETAILED: LEFT VENTRAL PROXIMAL FOREARM
LOCATION DETAILED: RIGHT PROXIMAL PRETIBIAL REGION
LOCATION DETAILED: RIGHT VENTRAL PROXIMAL FOREARM
LOCATION DETAILED: RIGHT INFERIOR CENTRAL MALAR CHEEK
LOCATION DETAILED: RIGHT INFERIOR MEDIAL UPPER BACK
LOCATION DETAILED: LEFT LATERAL PROXIMAL PRETIBIAL REGION
LOCATION DETAILED: LEFT NASAL SIDEWALL
LOCATION DETAILED: LEFT MEDIAL FRONTAL SCALP
LOCATION DETAILED: POSTERIOR MID-PARIETAL SCALP
LOCATION DETAILED: RIGHT SUPERIOR UPPER BACK
LOCATION DETAILED: RIGHT MEDIAL PLANTAR MIDFOOT
LOCATION DETAILED: RIGHT DISTAL POSTERIOR UPPER ARM
LOCATION DETAILED: SUPERIOR THORACIC SPINE

## 2022-11-29 ASSESSMENT — LOCATION SIMPLE DESCRIPTION DERM
LOCATION SIMPLE: RIGHT LOWER BACK
LOCATION SIMPLE: LEFT POSTERIOR UPPER ARM
LOCATION SIMPLE: ABDOMEN
LOCATION SIMPLE: POSTERIOR SCALP
LOCATION SIMPLE: RIGHT UPPER BACK
LOCATION SIMPLE: RIGHT PRETIBIAL REGION
LOCATION SIMPLE: LEFT POSTERIOR THIGH
LOCATION SIMPLE: RIGHT FOREHEAD
LOCATION SIMPLE: RIGHT POSTERIOR THIGH
LOCATION SIMPLE: RIGHT POSTERIOR UPPER ARM
LOCATION SIMPLE: LEFT PLANTAR SURFACE
LOCATION SIMPLE: RIGHT PLANTAR SURFACE
LOCATION SIMPLE: LEFT FOREARM
LOCATION SIMPLE: LEFT NOSE
LOCATION SIMPLE: SCALP
LOCATION SIMPLE: LEFT SCALP
LOCATION SIMPLE: NOSE
LOCATION SIMPLE: RIGHT CHEEK
LOCATION SIMPLE: LEFT PRETIBIAL REGION
LOCATION SIMPLE: UPPER BACK
LOCATION SIMPLE: RIGHT FOREARM

## 2022-11-29 ASSESSMENT — LOCATION ZONE DERM
LOCATION ZONE: LEG
LOCATION ZONE: ARM
LOCATION ZONE: TRUNK
LOCATION ZONE: FEET
LOCATION ZONE: NOSE
LOCATION ZONE: FACE
LOCATION ZONE: SCALP

## 2022-11-29 NOTE — PROCEDURE: LIQUID NITROGEN
Render Post-Care Instructions In Note?: yes
Consent: The patient's consent was obtained including but not limited to risks of crusting, scabbing, blistering, scarring, darker or lighter pigmentary change, recurrence, incomplete removal and infection. RTC in 2 months if lesion(s) persistent.
Detail Level: Detailed
Render Note In Bullet Format When Appropriate: No
Duration Of Freeze Thaw-Cycle (Seconds): 10
Number Of Freeze-Thaw Cycles: 2 freeze-thaw cycles
Post-Care Instructions: I reviewed with the patient in detail post-care instructions. Patient is to wear sunprotection, and avoid picking at any of the treated lesions. Pt may apply Vaseline to crusted or scabbing areas.

## 2022-11-29 NOTE — PROCEDURE: MIPS QUALITY
Quality 226: Preventive Care And Screening: Tobacco Use: Screening And Cessation Intervention: Patient screened for tobacco use and is an ex/non-smoker
Quality 111:Pneumonia Vaccination Status For Older Adults: Pneumococcal vaccine (PPSV23) administered on or after patient’s 60th birthday and before the end of the measurement period
Quality 130: Documentation Of Current Medications In The Medical Record: Current Medications Documented
Quality 431: Preventive Care And Screening: Unhealthy Alcohol Use - Screening: Patient not identified as an unhealthy alcohol user when screened for unhealthy alcohol use using a systematic screening method
Detail Level: Detailed

## 2022-12-27 ENCOUNTER — HOSPITAL ENCOUNTER (OUTPATIENT)
Dept: CARDIOLOGY | Facility: MEDICAL CENTER | Age: 67
End: 2022-12-27
Attending: INTERNAL MEDICINE
Payer: MEDICARE

## 2022-12-27 DIAGNOSIS — I77.810 ASCENDING AORTA DILATATION (HCC): ICD-10-CM

## 2022-12-27 PROCEDURE — 93306 TTE W/DOPPLER COMPLETE: CPT

## 2022-12-28 LAB
LV EJECT FRACT  99904: 55
LV EJECT FRACT MOD 2C 99903: 62.91
LV EJECT FRACT MOD 4C 99902: 52
LV EJECT FRACT MOD BP 99901: 58.99

## 2022-12-28 PROCEDURE — 93306 TTE W/DOPPLER COMPLETE: CPT | Mod: 26 | Performed by: INTERNAL MEDICINE

## 2023-02-28 ENCOUNTER — AMBULATORY SURGICAL CENTER (OUTPATIENT)
Dept: URBAN - METROPOLITAN AREA SURGERY 1 | Facility: SURGERY | Age: 68
End: 2023-02-28
Payer: COMMERCIAL

## 2023-02-28 ENCOUNTER — OFFICE (OUTPATIENT)
Dept: URBAN - METROPOLITAN AREA PATHOLOGY 21 | Facility: PATHOLOGY | Age: 68
End: 2023-02-28
Payer: COMMERCIAL

## 2023-02-28 VITALS
HEART RATE: 71 BPM | SYSTOLIC BLOOD PRESSURE: 121 MMHG | SYSTOLIC BLOOD PRESSURE: 138 MMHG | DIASTOLIC BLOOD PRESSURE: 59 MMHG | HEART RATE: 60 BPM | OXYGEN SATURATION: 97 % | SYSTOLIC BLOOD PRESSURE: 121 MMHG | WEIGHT: 214 LBS | DIASTOLIC BLOOD PRESSURE: 69 MMHG | OXYGEN SATURATION: 99 % | HEART RATE: 67 BPM | HEIGHT: 74 IN | OXYGEN SATURATION: 100 % | HEART RATE: 55 BPM | DIASTOLIC BLOOD PRESSURE: 85 MMHG | RESPIRATION RATE: 18 BRPM | TEMPERATURE: 97 F | SYSTOLIC BLOOD PRESSURE: 138 MMHG | HEART RATE: 60 BPM | TEMPERATURE: 97 F | SYSTOLIC BLOOD PRESSURE: 126 MMHG | DIASTOLIC BLOOD PRESSURE: 81 MMHG | DIASTOLIC BLOOD PRESSURE: 59 MMHG | OXYGEN SATURATION: 99 % | HEART RATE: 67 BPM | HEART RATE: 71 BPM | OXYGEN SATURATION: 100 % | SYSTOLIC BLOOD PRESSURE: 120 MMHG | DIASTOLIC BLOOD PRESSURE: 69 MMHG | DIASTOLIC BLOOD PRESSURE: 79 MMHG | TEMPERATURE: 98 F | TEMPERATURE: 98 F | RESPIRATION RATE: 18 BRPM | OXYGEN SATURATION: 97 % | SYSTOLIC BLOOD PRESSURE: 126 MMHG | DIASTOLIC BLOOD PRESSURE: 81 MMHG | HEIGHT: 74 IN | DIASTOLIC BLOOD PRESSURE: 85 MMHG | HEART RATE: 57 BPM | DIASTOLIC BLOOD PRESSURE: 79 MMHG | RESPIRATION RATE: 16 BRPM | RESPIRATION RATE: 16 BRPM | SYSTOLIC BLOOD PRESSURE: 116 MMHG | OXYGEN SATURATION: 98 % | SYSTOLIC BLOOD PRESSURE: 120 MMHG | WEIGHT: 214 LBS | HEART RATE: 57 BPM | OXYGEN SATURATION: 98 % | HEART RATE: 55 BPM | SYSTOLIC BLOOD PRESSURE: 116 MMHG

## 2023-02-28 DIAGNOSIS — K62.1 RECTAL POLYP: ICD-10-CM

## 2023-02-28 DIAGNOSIS — K57.30 DIVERTICULOSIS OF LARGE INTESTINE WITHOUT PERFORATION OR ABS: ICD-10-CM

## 2023-02-28 DIAGNOSIS — K64.1 SECOND DEGREE HEMORRHOIDS: ICD-10-CM

## 2023-02-28 DIAGNOSIS — D12.0 BENIGN NEOPLASM OF CECUM: ICD-10-CM

## 2023-02-28 DIAGNOSIS — Z12.11 ENCOUNTER FOR SCREENING FOR MALIGNANT NEOPLASM OF COLON: ICD-10-CM

## 2023-02-28 DIAGNOSIS — D12.4 BENIGN NEOPLASM OF DESCENDING COLON: ICD-10-CM

## 2023-02-28 PROBLEM — K63.5 POLYP OF COLON: Status: ACTIVE | Noted: 2023-02-28

## 2023-02-28 PROCEDURE — 45380 COLONOSCOPY AND BIOPSY: CPT | Mod: PT,59 | Performed by: INTERNAL MEDICINE

## 2023-02-28 PROCEDURE — 45380 COLONOSCOPY AND BIOPSY: CPT | Mod: 59,PT | Performed by: INTERNAL MEDICINE

## 2023-02-28 PROCEDURE — 45385 COLONOSCOPY W/LESION REMOVAL: CPT | Mod: PT | Performed by: INTERNAL MEDICINE

## 2023-02-28 PROCEDURE — 88305 TISSUE EXAM BY PATHOLOGIST: CPT | Performed by: STUDENT IN AN ORGANIZED HEALTH CARE EDUCATION/TRAINING PROGRAM

## 2023-03-22 ENCOUNTER — PATIENT MESSAGE (OUTPATIENT)
Dept: CARDIOLOGY | Facility: MEDICAL CENTER | Age: 68
End: 2023-03-22
Payer: MEDICARE

## 2023-03-31 SDOH — HEALTH STABILITY: PHYSICAL HEALTH: ON AVERAGE, HOW MANY DAYS PER WEEK DO YOU ENGAGE IN MODERATE TO STRENUOUS EXERCISE (LIKE A BRISK WALK)?: 6 DAYS

## 2023-03-31 SDOH — HEALTH STABILITY: PHYSICAL HEALTH: ON AVERAGE, HOW MANY MINUTES DO YOU ENGAGE IN EXERCISE AT THIS LEVEL?: 50 MIN

## 2023-03-31 SDOH — ECONOMIC STABILITY: HOUSING INSECURITY: IN THE LAST 12 MONTHS, HOW MANY PLACES HAVE YOU LIVED?: 1

## 2023-03-31 SDOH — ECONOMIC STABILITY: FOOD INSECURITY: WITHIN THE PAST 12 MONTHS, YOU WORRIED THAT YOUR FOOD WOULD RUN OUT BEFORE YOU GOT MONEY TO BUY MORE.: NEVER TRUE

## 2023-03-31 SDOH — ECONOMIC STABILITY: HOUSING INSECURITY
IN THE LAST 12 MONTHS, WAS THERE A TIME WHEN YOU DID NOT HAVE A STEADY PLACE TO SLEEP OR SLEPT IN A SHELTER (INCLUDING NOW)?: NO

## 2023-03-31 SDOH — ECONOMIC STABILITY: TRANSPORTATION INSECURITY
IN THE PAST 12 MONTHS, HAS LACK OF RELIABLE TRANSPORTATION KEPT YOU FROM MEDICAL APPOINTMENTS, MEETINGS, WORK OR FROM GETTING THINGS NEEDED FOR DAILY LIVING?: NO

## 2023-03-31 SDOH — ECONOMIC STABILITY: INCOME INSECURITY: IN THE LAST 12 MONTHS, WAS THERE A TIME WHEN YOU WERE NOT ABLE TO PAY THE MORTGAGE OR RENT ON TIME?: NO

## 2023-03-31 SDOH — ECONOMIC STABILITY: INCOME INSECURITY: HOW HARD IS IT FOR YOU TO PAY FOR THE VERY BASICS LIKE FOOD, HOUSING, MEDICAL CARE, AND HEATING?: NOT HARD AT ALL

## 2023-03-31 SDOH — ECONOMIC STABILITY: FOOD INSECURITY: WITHIN THE PAST 12 MONTHS, THE FOOD YOU BOUGHT JUST DIDN'T LAST AND YOU DIDN'T HAVE MONEY TO GET MORE.: NEVER TRUE

## 2023-03-31 SDOH — ECONOMIC STABILITY: TRANSPORTATION INSECURITY
IN THE PAST 12 MONTHS, HAS THE LACK OF TRANSPORTATION KEPT YOU FROM MEDICAL APPOINTMENTS OR FROM GETTING MEDICATIONS?: NO

## 2023-03-31 SDOH — ECONOMIC STABILITY: TRANSPORTATION INSECURITY
IN THE PAST 12 MONTHS, HAS LACK OF TRANSPORTATION KEPT YOU FROM MEETINGS, WORK, OR FROM GETTING THINGS NEEDED FOR DAILY LIVING?: NO

## 2023-03-31 SDOH — HEALTH STABILITY: MENTAL HEALTH
STRESS IS WHEN SOMEONE FEELS TENSE, NERVOUS, ANXIOUS, OR CAN'T SLEEP AT NIGHT BECAUSE THEIR MIND IS TROUBLED. HOW STRESSED ARE YOU?: ONLY A LITTLE

## 2023-03-31 ASSESSMENT — LIFESTYLE VARIABLES
HOW OFTEN DO YOU HAVE A DRINK CONTAINING ALCOHOL: MONTHLY OR LESS
AUDIT-C TOTAL SCORE: 1
SKIP TO QUESTIONS 9-10: 1
HOW OFTEN DO YOU HAVE SIX OR MORE DRINKS ON ONE OCCASION: NEVER
HOW MANY STANDARD DRINKS CONTAINING ALCOHOL DO YOU HAVE ON A TYPICAL DAY: 1 OR 2

## 2023-03-31 ASSESSMENT — SOCIAL DETERMINANTS OF HEALTH (SDOH)
HOW OFTEN DO YOU HAVE A DRINK CONTAINING ALCOHOL: MONTHLY OR LESS
HOW OFTEN DO YOU HAVE SIX OR MORE DRINKS ON ONE OCCASION: NEVER
HOW OFTEN DO YOU GET TOGETHER WITH FRIENDS OR RELATIVES?: ONCE A WEEK
HOW HARD IS IT FOR YOU TO PAY FOR THE VERY BASICS LIKE FOOD, HOUSING, MEDICAL CARE, AND HEATING?: NOT HARD AT ALL
DO YOU BELONG TO ANY CLUBS OR ORGANIZATIONS SUCH AS CHURCH GROUPS UNIONS, FRATERNAL OR ATHLETIC GROUPS, OR SCHOOL GROUPS?: NO
IN A TYPICAL WEEK, HOW MANY TIMES DO YOU TALK ON THE PHONE WITH FAMILY, FRIENDS, OR NEIGHBORS?: THREE TIMES A WEEK
HOW OFTEN DO YOU ATTEND CHURCH OR RELIGIOUS SERVICES?: NEVER
IN A TYPICAL WEEK, HOW MANY TIMES DO YOU TALK ON THE PHONE WITH FAMILY, FRIENDS, OR NEIGHBORS?: THREE TIMES A WEEK
DO YOU BELONG TO ANY CLUBS OR ORGANIZATIONS SUCH AS CHURCH GROUPS UNIONS, FRATERNAL OR ATHLETIC GROUPS, OR SCHOOL GROUPS?: NO
HOW OFTEN DO YOU ATTENT MEETINGS OF THE CLUB OR ORGANIZATION YOU BELONG TO?: NEVER
HOW OFTEN DO YOU ATTENT MEETINGS OF THE CLUB OR ORGANIZATION YOU BELONG TO?: NEVER
HOW OFTEN DO YOU GET TOGETHER WITH FRIENDS OR RELATIVES?: ONCE A WEEK
HOW MANY DRINKS CONTAINING ALCOHOL DO YOU HAVE ON A TYPICAL DAY WHEN YOU ARE DRINKING: 1 OR 2
HOW OFTEN DO YOU ATTEND CHURCH OR RELIGIOUS SERVICES?: NEVER
WITHIN THE PAST 12 MONTHS, YOU WORRIED THAT YOUR FOOD WOULD RUN OUT BEFORE YOU GOT THE MONEY TO BUY MORE: NEVER TRUE

## 2023-04-03 ENCOUNTER — OFFICE VISIT (OUTPATIENT)
Dept: MEDICAL GROUP | Facility: MEDICAL CENTER | Age: 68
End: 2023-04-03
Payer: MEDICARE

## 2023-04-03 VITALS
RESPIRATION RATE: 16 BRPM | SYSTOLIC BLOOD PRESSURE: 110 MMHG | WEIGHT: 236.33 LBS | HEIGHT: 76 IN | HEART RATE: 65 BPM | DIASTOLIC BLOOD PRESSURE: 60 MMHG | BODY MASS INDEX: 28.78 KG/M2 | OXYGEN SATURATION: 97 % | TEMPERATURE: 97.8 F

## 2023-04-03 DIAGNOSIS — G93.0 ARACHNOID CYST OF POSTERIOR CRANIAL FOSSA: ICD-10-CM

## 2023-04-03 DIAGNOSIS — N40.1 BENIGN PROSTATIC HYPERPLASIA WITH INCOMPLETE BLADDER EMPTYING: ICD-10-CM

## 2023-04-03 DIAGNOSIS — R39.14 BENIGN PROSTATIC HYPERPLASIA WITH INCOMPLETE BLADDER EMPTYING: ICD-10-CM

## 2023-04-03 DIAGNOSIS — I10 PRIMARY HYPERTENSION: ICD-10-CM

## 2023-04-03 DIAGNOSIS — E78.5 DYSLIPIDEMIA: ICD-10-CM

## 2023-04-03 DIAGNOSIS — H40.1130 PRIMARY OPEN ANGLE GLAUCOMA OF BOTH EYES, UNSPECIFIED GLAUCOMA STAGE: ICD-10-CM

## 2023-04-03 DIAGNOSIS — I77.810 ASCENDING AORTA DILATATION (HCC): ICD-10-CM

## 2023-04-03 PROCEDURE — 99214 OFFICE O/P EST MOD 30 MIN: CPT | Performed by: FAMILY MEDICINE

## 2023-04-03 RX ORDER — METOPROLOL SUCCINATE 25 MG/1
25 TABLET, EXTENDED RELEASE ORAL
COMMUNITY
Start: 2023-01-26 | End: 2023-04-03

## 2023-04-03 ASSESSMENT — FIBROSIS 4 INDEX: FIB4 SCORE: 1.68

## 2023-04-03 ASSESSMENT — PATIENT HEALTH QUESTIONNAIRE - PHQ9: CLINICAL INTERPRETATION OF PHQ2 SCORE: 0

## 2023-04-03 NOTE — PROGRESS NOTES
CC: New patient: Arachnoid cyst, hyperlipidemia, hypertension, ascending aorta dilatation BPH glaucoma    HPI:  Tyler presents today to establish new PCP.    Patient has been active and independent with all ADLs.  The following chronic medical issues:    Arachnoid cyst of posterior cranial fossa  Patient has been having arachnoid cyst for a while.  He has been asymptomatic.  As per patient he had an screening MRI for the whole body as showed no change in size.  Currently denies any headache, dizziness, or visual disturbance.     Dyslipidemia  He has been tolerating the statin. Denies muscle pain LFTs has been normal.  Patient has been on atorvastatin 20 mg daily    Ascending aorta dilatation (HCC)  Last echocardiogram showed ascending aorta was 4.4 cm.  Denies any chest pain or shortness of breath.  Patient has been having well-controlled blood pressure and heart rate.  Currently on metoprolol SR 50 mg daily.  Has been following up with cardiology    Benign prostatic hyperplasia with incomplete bladder emptying  Patient reported that he has been having weak stream.  However it has not been affecting his quality of life.He does not think he needs medication for now.  However he is concerned about his PSA, it has been being checked for 2 years.  Requested PSA    Primary open angle glaucoma of both eyes, unspecified glaucoma stage  Patient has been doing fine on Xalatan eyedrops.  He has been following up with ophthalmologist.    Primary hypertension  Blood pressure has been adequate controlled on metoprolol XL 50 mg daily.  Denies any headache, chest pain, shortness of breath.      Patient Active Problem List    Diagnosis Date Noted    Benign prostatic hyperplasia with incomplete bladder emptying 04/03/2023    Arachnoid cyst of posterior cranial fossa 04/03/2023    Primary open angle glaucoma (POAG) of both eyes 04/03/2023    Primary osteoarthritis of left hip 10/20/2021    Cervical radiculopathy 06/05/2020     Gastroesophageal reflux disease without esophagitis 06/05/2020    Adenomatous polyp of sigmoid colon 06/05/2020    Dyslipidemia 08/01/2019    Ascending aorta dilatation (HCC) 08/08/2017    Meniere disease 07/27/2017    Insomnia due to medical condition 02/17/2015    Eczema 02/17/2015       Current Outpatient Medications   Medication Sig Dispense Refill    atorvastatin (LIPITOR) 20 MG Tab Take 1 Tablet by mouth every day. N THE EVENING 90 Tablet 4    metoprolol SR (TOPROL XL) 50 MG TABLET SR 24 HR Take 1 Tablet by mouth every day. 90 Tablet 4    latanoprost (XALATAN) 0.005 % Solution        No current facility-administered medications for this visit.         Allergies as of 04/03/2023    (No Known Allergies)        Social History     Socioeconomic History    Marital status:      Spouse name: Not on file    Number of children: Not on file    Years of education: Not on file    Highest education level: Bachelor's degree (e.g., BA, AB, BS)   Occupational History    Not on file   Tobacco Use    Smoking status: Never    Smokeless tobacco: Never   Vaping Use    Vaping Use: Never used   Substance and Sexual Activity    Alcohol use: Not Currently     Comment: rare    Drug use: No    Sexual activity: Yes     Partners: Female     Comment: ,  [employer]   Other Topics Concern     Service No    Blood Transfusions No    Caffeine Concern No    Occupational Exposure No    Hobby Hazards No    Sleep Concern Yes    Stress Concern No    Weight Concern No    Special Diet No    Back Care No    Exercise Yes    Bike Helmet No    Seat Belt Yes    Self-Exams Yes   Social History Narrative    Not on file     Social Determinants of Health     Financial Resource Strain: Low Risk     Difficulty of Paying Living Expenses: Not hard at all   Food Insecurity: No Food Insecurity    Worried About Running Out of Food in the Last Year: Never true    Ran Out of Food in the Last Year: Never true   Transportation Needs: No  Transportation Needs    Lack of Transportation (Medical): No    Lack of Transportation (Non-Medical): No   Physical Activity: Sufficiently Active    Days of Exercise per Week: 6 days    Minutes of Exercise per Session: 50 min   Stress: No Stress Concern Present    Feeling of Stress : Only a little   Social Connections: Moderately Isolated    Frequency of Communication with Friends and Family: Three times a week    Frequency of Social Gatherings with Friends and Family: Once a week    Attends [employer]ism Services: Never    Active Member of Clubs or Organizations: No    Attends Club or Organization Meetings: Never    Marital Status:    Intimate Partner Violence: Not on file   Housing Stability: Low Risk     Unable to Pay for Housing in the Last Year: No    Number of Places Lived in the Last Year: 1    Unstable Housing in the Last Year: No       Family History   Problem Relation Age of Onset    Cancer Father         melanoma    GI Disease Mother     Cancer Sister         breast    Cancer Maternal Grandmother         kidney    Cancer Maternal Grandfather         prostate    Cancer Paternal Grandmother         liver    Cancer Paternal Grandfather         throat    Diabetes Neg Hx     Heart Disease Neg Hx     Stroke Neg Hx        Past Surgical History:   Procedure Laterality Date    ID TOTAL HIP ARTHROPLASTY Left 11/2/2021    Procedure: ARTHROPLASTY, HIP, TOTAL, ANTERIOR APPROACH;  Surgeon: Costa Pemberton M.D.;  Location: [facility];  Service: Orthopedics    CATARACT EXTRACTION WITH IOL Bilateral     COLONOSCOPY  age 50    neg    EYE SURGERY  age 4    R eye,trauma       ROS:  Denies any Headache, Blurred Vision, Confusion Chest pain,  Shortness of breath,  Abdominal pain, Changes of bowel or bladder, Lower ext edema, Fevers, Nights sweats, Weight Changes, Focal weakness or numbness.  All other systems are negative.    /60 (BP Location: Right arm, Patient Position: Sitting, BP Cuff  "Size: Adult)   Pulse 65   Temp 36.6 °C (97.8 °F) (Temporal)   Resp 16   Ht 1.93 m (6' 4\")   Wt 107 kg (236 lb 5.3 oz)   SpO2 97%   BMI 28.77 kg/m²     Physical Exam:  Gen:         Alert and oriented, No apparent distress.  HEENT:   Perrla, TM clear,  Oralpharynx no erythema or exudates.  Neck:       No Jugular venous distension, Lymphadenopathy, Thyromegaly, Bruits.  Lungs:     Clear to auscultation bilaterally  CV:          Regular rate and rhythm. No murmurs, rubs or gallops.  Abd:         Soft non tender, non distended. Normal active bowel sounds. No                                        Hepatosplenomegaly, No pulsatile masses.  Ext:          No clubbing, cyanosis, edema.      Assessment and Plan.   67 y.o. male     1. Arachnoid cyst of posterior cranial fossa  Chronic.  Asymptomatic.  Patient advised to establish with neurosurgeon for more evaluation and future follow-up    - Referral to Neurosurgery    2. Dyslipidemia  He has been tolerating the statin. Denies muscle pain LFTs has been normal  Continue atorvastatin 20 mg daily    - Lipid Profile; Future    3. Ascending aorta dilatation (HCC)  Last echocardiogram showed ascending aorta was 4.4 cm.  Asymptomatic  Continue proper control of blood pressure and heart rate, continue metoprolol  Continue follow-up with cardiology    4. Benign prostatic hyperplasia with incomplete bladder emptying  Patient has been having weak stream.  However it has not been affecting his quality of life.  He does not think he needs medication for now.  However he is concerned about his PSA, it has been being checked for 2 years    - PROSTATE SPECIFIC AG SCREENING; Future    5. Primary open angle glaucoma of both eyes, unspecified glaucoma stage  Patient has been doing fine on Xalatan eyedrops  Continue follow-up with ophthalmology    6. Primary hypertension  Controlled.  Continue on metoprolol XL 50 mg daily    - CBC WITH DIFFERENTIAL; Future  - Comp Metabolic Panel; " Future  - Lipid Profile; Future  - TSH; Future

## 2023-06-05 ENCOUNTER — APPOINTMENT (RX ONLY)
Dept: URBAN - METROPOLITAN AREA CLINIC 20 | Facility: CLINIC | Age: 68
Setting detail: DERMATOLOGY
End: 2023-06-05

## 2023-06-05 DIAGNOSIS — D18.0 HEMANGIOMA: ICD-10-CM

## 2023-06-05 DIAGNOSIS — L81.4 OTHER MELANIN HYPERPIGMENTATION: ICD-10-CM

## 2023-06-05 DIAGNOSIS — Z71.89 OTHER SPECIFIED COUNSELING: ICD-10-CM

## 2023-06-05 DIAGNOSIS — D22 MELANOCYTIC NEVI: ICD-10-CM

## 2023-06-05 DIAGNOSIS — L82.1 OTHER SEBORRHEIC KERATOSIS: ICD-10-CM

## 2023-06-05 DIAGNOSIS — Z85.828 PERSONAL HISTORY OF OTHER MALIGNANT NEOPLASM OF SKIN: ICD-10-CM

## 2023-06-05 PROBLEM — D22.39 MELANOCYTIC NEVI OF OTHER PARTS OF FACE: Status: ACTIVE | Noted: 2023-06-05

## 2023-06-05 PROBLEM — D22.72 MELANOCYTIC NEVI OF LEFT LOWER LIMB, INCLUDING HIP: Status: ACTIVE | Noted: 2023-06-05

## 2023-06-05 PROBLEM — D22.61 MELANOCYTIC NEVI OF RIGHT UPPER LIMB, INCLUDING SHOULDER: Status: ACTIVE | Noted: 2023-06-05

## 2023-06-05 PROBLEM — D23.71 OTHER BENIGN NEOPLASM OF SKIN OF RIGHT LOWER LIMB, INCLUDING HIP: Status: ACTIVE | Noted: 2023-06-05

## 2023-06-05 PROBLEM — D22.62 MELANOCYTIC NEVI OF LEFT UPPER LIMB, INCLUDING SHOULDER: Status: ACTIVE | Noted: 2023-06-05

## 2023-06-05 PROBLEM — D22.5 MELANOCYTIC NEVI OF TRUNK: Status: ACTIVE | Noted: 2023-06-05

## 2023-06-05 PROBLEM — D18.01 HEMANGIOMA OF SKIN AND SUBCUTANEOUS TISSUE: Status: ACTIVE | Noted: 2023-06-05

## 2023-06-05 PROBLEM — D22.71 MELANOCYTIC NEVI OF RIGHT LOWER LIMB, INCLUDING HIP: Status: ACTIVE | Noted: 2023-06-05

## 2023-06-05 PROCEDURE — ? COUNSELING

## 2023-06-05 PROCEDURE — ? SUNSCREEN RECOMMENDATIONS

## 2023-06-05 PROCEDURE — ? OBSERVATION

## 2023-06-05 PROCEDURE — 99213 OFFICE O/P EST LOW 20 MIN: CPT

## 2023-06-05 ASSESSMENT — LOCATION SIMPLE DESCRIPTION DERM
LOCATION SIMPLE: LEFT POSTERIOR THIGH
LOCATION SIMPLE: LEFT FOREARM
LOCATION SIMPLE: RIGHT FOREARM
LOCATION SIMPLE: RIGHT LOWER BACK
LOCATION SIMPLE: LEFT PRETIBIAL REGION
LOCATION SIMPLE: RIGHT FOREHEAD
LOCATION SIMPLE: LEFT POSTERIOR UPPER ARM
LOCATION SIMPLE: RIGHT POSTERIOR UPPER ARM
LOCATION SIMPLE: RIGHT PRETIBIAL REGION
LOCATION SIMPLE: RIGHT CHEEK
LOCATION SIMPLE: RIGHT UPPER BACK
LOCATION SIMPLE: UPPER BACK
LOCATION SIMPLE: RIGHT POSTERIOR THIGH
LOCATION SIMPLE: NOSE
LOCATION SIMPLE: ABDOMEN

## 2023-06-05 ASSESSMENT — LOCATION DETAILED DESCRIPTION DERM
LOCATION DETAILED: NASAL DORSUM
LOCATION DETAILED: LEFT PROXIMAL POSTERIOR UPPER ARM
LOCATION DETAILED: INFERIOR THORACIC SPINE
LOCATION DETAILED: RIGHT INFERIOR CENTRAL MALAR CHEEK
LOCATION DETAILED: RIGHT PROXIMAL PRETIBIAL REGION
LOCATION DETAILED: PERIUMBILICAL SKIN
LOCATION DETAILED: LEFT DISTAL POSTERIOR THIGH
LOCATION DETAILED: RIGHT INFERIOR MEDIAL UPPER BACK
LOCATION DETAILED: RIGHT SUPERIOR UPPER BACK
LOCATION DETAILED: LEFT VENTRAL PROXIMAL FOREARM
LOCATION DETAILED: LEFT LATERAL PROXIMAL PRETIBIAL REGION
LOCATION DETAILED: RIGHT VENTRAL PROXIMAL FOREARM
LOCATION DETAILED: RIGHT INFERIOR MEDIAL MIDBACK
LOCATION DETAILED: RIGHT DISTAL POSTERIOR UPPER ARM
LOCATION DETAILED: RIGHT DISTAL POSTERIOR THIGH
LOCATION DETAILED: RIGHT INFERIOR FOREHEAD
LOCATION DETAILED: SUPERIOR THORACIC SPINE

## 2023-06-05 ASSESSMENT — LOCATION ZONE DERM
LOCATION ZONE: ARM
LOCATION ZONE: LEG
LOCATION ZONE: NOSE
LOCATION ZONE: FACE
LOCATION ZONE: TRUNK

## 2023-09-05 ENCOUNTER — TELEPHONE (OUTPATIENT)
Dept: CARDIOLOGY | Facility: MEDICAL CENTER | Age: 68
End: 2023-09-05
Payer: MEDICARE

## 2023-09-05 DIAGNOSIS — I77.810 ASCENDING AORTA DILATATION (HCC): ICD-10-CM

## 2023-09-05 DIAGNOSIS — I10 PRIMARY HYPERTENSION: ICD-10-CM

## 2023-09-05 DIAGNOSIS — R42 LIGHTHEADEDNESS: ICD-10-CM

## 2023-09-05 DIAGNOSIS — H81.09 MENIERE'S DISEASE, UNSPECIFIED LATERALITY: ICD-10-CM

## 2023-09-05 DIAGNOSIS — E78.5 DYSLIPIDEMIA: Primary | ICD-10-CM

## 2023-09-05 DIAGNOSIS — R42 DIZZINESS: ICD-10-CM

## 2023-09-13 ENCOUNTER — PATIENT MESSAGE (OUTPATIENT)
Dept: CARDIOLOGY | Facility: MEDICAL CENTER | Age: 68
End: 2023-09-13
Payer: MEDICARE

## 2023-09-13 DIAGNOSIS — I10 PRIMARY HYPERTENSION: ICD-10-CM

## 2023-09-13 DIAGNOSIS — R42 LIGHTHEADEDNESS: ICD-10-CM

## 2023-09-13 DIAGNOSIS — I77.810 ASCENDING AORTA DILATATION (HCC): ICD-10-CM

## 2023-09-13 DIAGNOSIS — E78.5 DYSLIPIDEMIA: ICD-10-CM

## 2023-09-13 DIAGNOSIS — R42 DIZZINESS: ICD-10-CM

## 2023-09-14 NOTE — PATIENT COMMUNICATION
Sonia Rosa M.D.  You 13 hours ago (6:24 PM)     Treadmill stress is ok.     Sonia Rosa M.D.      Attempted to order treadmill stress test, dx codes are still not passing.    To TT, ok for pt to just complete regular echo as ordered at this time or do you have alternative recommendations for stress test?

## 2023-10-20 DIAGNOSIS — I10 PRIMARY HYPERTENSION: ICD-10-CM

## 2023-10-20 RX ORDER — METOPROLOL SUCCINATE 25 MG/1
25 TABLET, EXTENDED RELEASE ORAL
Qty: 90 TABLET | Refills: 0 | Status: SHIPPED | OUTPATIENT
Start: 2023-10-20 | End: 2023-10-30

## 2023-10-30 ENCOUNTER — OFFICE VISIT (OUTPATIENT)
Dept: CARDIOLOGY | Facility: MEDICAL CENTER | Age: 68
End: 2023-10-30
Attending: INTERNAL MEDICINE
Payer: MEDICARE

## 2023-10-30 VITALS
RESPIRATION RATE: 17 BRPM | BODY MASS INDEX: 28.74 KG/M2 | HEART RATE: 64 BPM | OXYGEN SATURATION: 98 % | WEIGHT: 236 LBS | SYSTOLIC BLOOD PRESSURE: 160 MMHG | HEIGHT: 76 IN | DIASTOLIC BLOOD PRESSURE: 80 MMHG

## 2023-10-30 DIAGNOSIS — I10 PRIMARY HYPERTENSION: ICD-10-CM

## 2023-10-30 DIAGNOSIS — I77.810 ASCENDING AORTA DILATATION (HCC): ICD-10-CM

## 2023-10-30 PROCEDURE — 99211 OFF/OP EST MAY X REQ PHY/QHP: CPT | Performed by: INTERNAL MEDICINE

## 2023-10-30 PROCEDURE — 3077F SYST BP >= 140 MM HG: CPT | Performed by: INTERNAL MEDICINE

## 2023-10-30 PROCEDURE — 3079F DIAST BP 80-89 MM HG: CPT | Performed by: INTERNAL MEDICINE

## 2023-10-30 PROCEDURE — 99214 OFFICE O/P EST MOD 30 MIN: CPT | Performed by: INTERNAL MEDICINE

## 2023-10-30 RX ORDER — BISOPROLOL FUMARATE 10 MG/1
10 TABLET, FILM COATED ORAL DAILY
Qty: 90 TABLET | Refills: 4 | Status: ON HOLD | OUTPATIENT
Start: 2023-10-30 | End: 2023-11-17

## 2023-10-30 ASSESSMENT — ENCOUNTER SYMPTOMS
PALPITATIONS: 0
ABDOMINAL PAIN: 0
LOSS OF CONSCIOUSNESS: 0
FALLS: 0
HALLUCINATIONS: 0
BRUISES/BLEEDS EASILY: 0
SENSORY CHANGE: 0
CLAUDICATION: 0
NAUSEA: 0
HEADACHES: 0
DIZZINESS: 1
COUGH: 0
MYALGIAS: 0
VOMITING: 0
BLURRED VISION: 0
PND: 0
EYE PAIN: 0
DOUBLE VISION: 0
SHORTNESS OF BREATH: 0
EYE DISCHARGE: 0
CHILLS: 0
DEPRESSION: 0
WEIGHT LOSS: 0
SPEECH CHANGE: 0
FEVER: 0
BLOOD IN STOOL: 0
ORTHOPNEA: 0

## 2023-10-30 NOTE — PROGRESS NOTES
Chief Complaint   Patient presents with    Lightheadedness     F/V DX: Lightheadedness        Subjective:   Tyler Live is a 68 y.o. male who presents today for evaluation of abnormal echocardiogram showing LVEF of 50% (which might be underestimated due to image quality).     No family history of sudden cardiac death.    Does have dilated ascending aorta of 4.4 cm.    01/2021 Had right hip surgery and BP dropped 1 day post op.    I have independently interpreted and reviewed echocardiogram's actual images with patient which showed normal left ventricular systolic function. No wall motion abnormality. No evidence of pulmonary hypertension. No significant valvular disease.    In the interim, patient has been doing well without having any symptoms. Patient denies having chest pain, dyspnea, palpitation, presyncope, syncope episodes. Able to climb up at least 2 flights of stairs.    I have independently interpreted and reviewed blood tests results with patient in clinic which shows LDL level of 54, triglycerides level of 104, GFR of 91, K of 4.2.      History reviewed. No pertinent past medical history.  Past Surgical History:   Procedure Laterality Date    [place] TOTAL HIP ARTHROPLASTY Left 11/2/2021    Procedure: ARTHROPLASTY, HIP, TOTAL, ANTERIOR APPROACH;  Surgeon: Costa Pemberton M.D.;  Location: [facility] - External Facilities;  Service: Orthopedics    CATARACT EXTRACTION WITH IOL Bilateral     COLONOSCOPY  age 50    neg    EYE SURGERY  age 4    R eye,trauma     Family History   Problem Relation Age of Onset    Cancer Father         melanoma    GI Disease Mother     Cancer Sister         breast    Cancer Maternal Grandmother         kidney    Cancer Maternal Grandfather         prostate    Cancer Paternal Grandmother         liver    Cancer Paternal Grandfather         throat    Diabetes Neg Hx     Heart Disease Neg Hx     Stroke Neg Hx      Social History     Socioeconomic History    Marital status:       Spouse name: Not on file    Number of children: Not on file    Years of education: Not on file    Highest education level: Bachelor's degree (e.g., BA, AB, BS)   Occupational History    Not on file   Tobacco Use    Smoking status: Never    Smokeless tobacco: Never   Vaping Use    Vaping Use: Never used   Substance and Sexual Activity    Alcohol use: Not Currently     Comment: rare    Drug use: No    Sexual activity: Yes     Partners: Female     Comment: ,  [employer]   Other Topics Concern     Service No    Blood Transfusions No    Caffeine Concern No    Occupational Exposure No    Hobby Hazards No    Sleep Concern Yes    Stress Concern No    Weight Concern No    Special Diet No    Back Care No    Exercise Yes    Bike Helmet No    Seat Belt Yes    Self-Exams Yes   Social History Narrative    Not on file     Social Determinants of Health     Financial Resource Strain: Low Risk  (3/31/2023)    Overall Financial Resource Strain (CARDIA)     Difficulty of Paying Living Expenses: Not hard at all   Food Insecurity: No Food Insecurity (3/31/2023)    Hunger Vital Sign     Worried About Running Out of Food in the Last Year: Never true     Ran Out of Food in the Last Year: Never true   Transportation Needs: No Transportation Needs (3/31/2023)    PRAPARE - Transportation     Lack of Transportation (Medical): No     Lack of Transportation (Non-Medical): No   Physical Activity: Sufficiently Active (3/31/2023)    Exercise Vital Sign     Days of Exercise per Week: 6 days     Minutes of Exercise per Session: 50 min   Stress: No Stress Concern Present (3/31/2023)    [place] Mode of Occupational Health - Occupational Stress Questionnaire     Feeling of Stress : Only a little   Social Connections: Moderately Isolated (3/31/2023)    Social Connection and Isolation Panel [NHANES]     Frequency of Communication with Friends and Family: Three times a week     Frequency of Social Gatherings with  Friends and Family: Once a week     Attends [employer] Services: Never     Active Member of Clubs or Organizations: No     Attends Club or Organization Meetings: Never     Marital Status:    Intimate Partner Violence: Not on file   Housing Stability: Low Risk  (3/31/2023)    Housing Stability Vital Sign     Unable to Pay for Housing in the Last Year: No     Number of Places Lived in the Last Year: 1     Unstable Housing in the Last Year: No     No Known Allergies  Outpatient Encounter Medications as of 10/30/2023   Medication Sig Dispense Refill    bisoprolol (ZEBETA) 10 MG tablet Take 1 Tablet by mouth every day. 90 Tablet 4    atorvastatin (LIPITOR) 20 MG Tab Take 1 Tablet by mouth every day. N THE EVENING 90 Tablet 4    latanoprost (XALATAN) 0.005 % Solution       [DISCONTINUED] metoprolol SR (TOPROL XL) 25 MG TABLET SR 24 HR TAKE 1 TABLET BY MOUTH EVERY DAY 90 Tablet 0    [DISCONTINUED] metoprolol SR (TOPROL XL) 50 MG TABLET SR 24 HR Take 1 Tablet by mouth every day. 90 Tablet 4     No facility-administered encounter medications on file as of 10/30/2023.     Review of Systems   Constitutional:  Negative for chills, fever, malaise/fatigue and weight loss.   HENT:  Negative for ear discharge, ear pain, hearing loss and nosebleeds.    Eyes:  Negative for blurred vision, double vision, pain and discharge.   Respiratory:  Negative for cough and shortness of breath.    Cardiovascular:  Negative for chest pain, palpitations, orthopnea, claudication, leg swelling and PND.   Gastrointestinal:  Negative for abdominal pain, blood in stool, melena, nausea and vomiting.   Genitourinary:  Negative for dysuria and hematuria.   Musculoskeletal:  Negative for falls, joint pain and myalgias.   Skin:  Negative for itching and rash.   Neurological:  Positive for dizziness. Negative for sensory change, speech change, loss of consciousness and headaches.   Endo/Heme/Allergies:  Negative for environmental allergies. Does not  "bruise/bleed easily.   Psychiatric/Behavioral:  Negative for depression, hallucinations and suicidal ideas.         Objective:   BP (!) 160/80 (BP Location: Left arm, Patient Position: Sitting, BP Cuff Size: Adult)   Pulse 64   Resp 17   Ht 1.93 m (6' 4\")   Wt 107 kg (236 lb)   SpO2 98%   BMI 28.73 kg/m²     Physical Exam  Vitals and nursing note reviewed.   Constitutional:       General: He is not in acute distress.     Appearance: He is not diaphoretic.   HENT:      Head: Normocephalic and atraumatic.      Right Ear: External ear normal.      Left Ear: External ear normal.   Eyes:      General:         Right eye: No discharge.         Left eye: No discharge.   Neck:      Thyroid: No thyromegaly.      Vascular: No JVD.   Cardiovascular:      Rate and Rhythm: Normal rate and regular rhythm.      Heart sounds: Normal heart sounds. No murmur heard.     No friction rub. No gallop.   Pulmonary:      Effort: No respiratory distress.      Breath sounds: Normal breath sounds.   Abdominal:      General: Bowel sounds are normal. There is no distension.      Tenderness: There is no abdominal tenderness.   Musculoskeletal:         General: No tenderness.   Skin:     General: Skin is warm and dry.   Neurological:      Mental Status: He is alert and oriented to person, place, and time.      Cranial Nerves: No cranial nerve deficit.   Psychiatric:         Behavior: Behavior normal.         Assessment:     1. Primary hypertension  bisoprolol (ZEBETA) 10 MG tablet      2. Ascending aorta dilatation (HCC)            Medical Decision Making:  Today's Assessment / Status / Plan:     Clinical monitor for ascending aorta dilation.     Blood pressure is NOT well controlled. Will change Toprol XL to Bisoprolol 10 mg daily.     Will refer to sleep medicine.    Await TTE.    Sonia Rosa M.D.          "

## 2023-10-31 ENCOUNTER — HOSPITAL ENCOUNTER (OUTPATIENT)
Dept: CARDIOLOGY | Facility: MEDICAL CENTER | Age: 68
End: 2023-10-31
Attending: INTERNAL MEDICINE
Payer: MEDICARE

## 2023-10-31 DIAGNOSIS — I10 PRIMARY HYPERTENSION: ICD-10-CM

## 2023-10-31 DIAGNOSIS — I77.810 ASCENDING AORTA DILATATION (HCC): ICD-10-CM

## 2023-10-31 LAB
LV EJECT FRACT  99904: 60
LV EJECT FRACT MOD 2C 99903: 65.66
LV EJECT FRACT MOD 4C 99902: 65.63
LV EJECT FRACT MOD BP 99901: 64.17

## 2023-10-31 PROCEDURE — 93306 TTE W/DOPPLER COMPLETE: CPT

## 2023-10-31 PROCEDURE — 93306 TTE W/DOPPLER COMPLETE: CPT | Mod: 26 | Performed by: INTERNAL MEDICINE

## 2023-11-14 ENCOUNTER — PATIENT MESSAGE (OUTPATIENT)
Dept: CARDIOLOGY | Facility: MEDICAL CENTER | Age: 68
End: 2023-11-14
Payer: MEDICARE

## 2023-11-14 NOTE — PATIENT COMMUNICATION
To TT, please advise. Pt was recently switched from Metoprolol to Bisoprolol which he feels is not controlling his BP, is reducing his HR to 40-50's, & causing intolerable s/s. He is requesting to switch back to Metoprolol, he was previously on 25 mg daily, he is willing to try increasing to 50 mg daily, ok to do so?

## 2023-11-16 ENCOUNTER — APPOINTMENT (OUTPATIENT)
Dept: RADIOLOGY | Facility: MEDICAL CENTER | Age: 68
End: 2023-11-16
Attending: EMERGENCY MEDICINE
Payer: MEDICARE

## 2023-11-16 ENCOUNTER — HOSPITAL ENCOUNTER (OUTPATIENT)
Facility: MEDICAL CENTER | Age: 68
End: 2023-11-17
Attending: EMERGENCY MEDICINE | Admitting: STUDENT IN AN ORGANIZED HEALTH CARE EDUCATION/TRAINING PROGRAM
Payer: MEDICARE

## 2023-11-16 DIAGNOSIS — I16.0 HYPERTENSIVE URGENCY: ICD-10-CM

## 2023-11-16 DIAGNOSIS — R07.9 CHEST PAIN, UNSPECIFIED TYPE: ICD-10-CM

## 2023-11-16 PROBLEM — R51.9 HEADACHE: Status: ACTIVE | Noted: 2023-11-16

## 2023-11-16 PROBLEM — I16.1 HYPERTENSIVE EMERGENCY: Status: ACTIVE | Noted: 2023-11-16

## 2023-11-16 LAB
ALBUMIN SERPL BCP-MCNC: 4.1 G/DL (ref 3.2–4.9)
ALBUMIN/GLOB SERPL: 1.6 G/DL
ALP SERPL-CCNC: 61 U/L (ref 30–99)
ALT SERPL-CCNC: 18 U/L (ref 2–50)
ANION GAP SERPL CALC-SCNC: 7 MMOL/L (ref 7–16)
APPEARANCE UR: CLEAR
AST SERPL-CCNC: 23 U/L (ref 12–45)
BASOPHILS # BLD AUTO: 0.4 % (ref 0–1.8)
BASOPHILS # BLD: 0.02 K/UL (ref 0–0.12)
BILIRUB SERPL-MCNC: 1.1 MG/DL (ref 0.1–1.5)
BILIRUB UR QL STRIP.AUTO: NEGATIVE
BUN SERPL-MCNC: 11 MG/DL (ref 8–22)
CALCIUM ALBUM COR SERPL-MCNC: 9.1 MG/DL (ref 8.5–10.5)
CALCIUM SERPL-MCNC: 9.2 MG/DL (ref 8.5–10.5)
CHLORIDE SERPL-SCNC: 106 MMOL/L (ref 96–112)
CO2 SERPL-SCNC: 29 MMOL/L (ref 20–33)
COLOR UR: YELLOW
CREAT SERPL-MCNC: 0.96 MG/DL (ref 0.5–1.4)
EKG IMPRESSION: NORMAL
EOSINOPHIL # BLD AUTO: 0.08 K/UL (ref 0–0.51)
EOSINOPHIL NFR BLD: 1.6 % (ref 0–6.9)
ERYTHROCYTE [DISTWIDTH] IN BLOOD BY AUTOMATED COUNT: 42.5 FL (ref 35.9–50)
GFR SERPLBLD CREATININE-BSD FMLA CKD-EPI: 86 ML/MIN/1.73 M 2
GLOBULIN SER CALC-MCNC: 2.5 G/DL (ref 1.9–3.5)
GLUCOSE SERPL-MCNC: 106 MG/DL (ref 65–99)
GLUCOSE UR STRIP.AUTO-MCNC: NEGATIVE MG/DL
HCT VFR BLD AUTO: 43.3 % (ref 42–52)
HGB BLD-MCNC: 14.6 G/DL (ref 14–18)
IMM GRANULOCYTES # BLD AUTO: 0.01 K/UL (ref 0–0.11)
IMM GRANULOCYTES NFR BLD AUTO: 0.2 % (ref 0–0.9)
KETONES UR STRIP.AUTO-MCNC: NEGATIVE MG/DL
LEUKOCYTE ESTERASE UR QL STRIP.AUTO: NEGATIVE
LYMPHOCYTES # BLD AUTO: 1.87 K/UL (ref 1–4.8)
LYMPHOCYTES NFR BLD: 36.7 % (ref 22–41)
MCH RBC QN AUTO: 30.9 PG (ref 27–33)
MCHC RBC AUTO-ENTMCNC: 33.7 G/DL (ref 32.3–36.5)
MCV RBC AUTO: 91.7 FL (ref 81.4–97.8)
MICRO URNS: NORMAL
MONOCYTES # BLD AUTO: 0.42 K/UL (ref 0–0.85)
MONOCYTES NFR BLD AUTO: 8.3 % (ref 0–13.4)
NEUTROPHILS # BLD AUTO: 2.69 K/UL (ref 1.82–7.42)
NEUTROPHILS NFR BLD: 52.8 % (ref 44–72)
NITRITE UR QL STRIP.AUTO: NEGATIVE
NRBC # BLD AUTO: 0 K/UL
NRBC BLD-RTO: 0 /100 WBC (ref 0–0.2)
PH UR STRIP.AUTO: 7.5 [PH] (ref 5–8)
PLATELET # BLD AUTO: 184 K/UL (ref 164–446)
PMV BLD AUTO: 9.8 FL (ref 9–12.9)
POTASSIUM SERPL-SCNC: 4 MMOL/L (ref 3.6–5.5)
PROT SERPL-MCNC: 6.6 G/DL (ref 6–8.2)
PROT UR QL STRIP: NEGATIVE MG/DL
RBC # BLD AUTO: 4.72 M/UL (ref 4.7–6.1)
RBC UR QL AUTO: NEGATIVE
SODIUM SERPL-SCNC: 142 MMOL/L (ref 135–145)
SP GR UR STRIP.AUTO: 1.01
TROPONIN T SERPL-MCNC: 10 NG/L (ref 6–19)
TROPONIN T SERPL-MCNC: 9 NG/L (ref 6–19)
TROPONIN T SERPL-MCNC: 9 NG/L (ref 6–19)
UROBILINOGEN UR STRIP.AUTO-MCNC: 0.2 MG/DL
WBC # BLD AUTO: 5.1 K/UL (ref 4.8–10.8)

## 2023-11-16 PROCEDURE — A9270 NON-COVERED ITEM OR SERVICE: HCPCS | Performed by: EMERGENCY MEDICINE

## 2023-11-16 PROCEDURE — 83735 ASSAY OF MAGNESIUM: CPT

## 2023-11-16 PROCEDURE — 85025 COMPLETE CBC W/AUTO DIFF WBC: CPT

## 2023-11-16 PROCEDURE — 36415 COLL VENOUS BLD VENIPUNCTURE: CPT

## 2023-11-16 PROCEDURE — 93005 ELECTROCARDIOGRAM TRACING: CPT

## 2023-11-16 PROCEDURE — 96374 THER/PROPH/DIAG INJ IV PUSH: CPT | Mod: XU

## 2023-11-16 PROCEDURE — 700111 HCHG RX REV CODE 636 W/ 250 OVERRIDE (IP): Performed by: EMERGENCY MEDICINE

## 2023-11-16 PROCEDURE — 96375 TX/PRO/DX INJ NEW DRUG ADDON: CPT | Mod: XU

## 2023-11-16 PROCEDURE — 70450 CT HEAD/BRAIN W/O DYE: CPT

## 2023-11-16 PROCEDURE — G0378 HOSPITAL OBSERVATION PER HR: HCPCS

## 2023-11-16 PROCEDURE — 96372 THER/PROPH/DIAG INJ SC/IM: CPT | Mod: XU

## 2023-11-16 PROCEDURE — A9270 NON-COVERED ITEM OR SERVICE: HCPCS

## 2023-11-16 PROCEDURE — 99285 EMERGENCY DEPT VISIT HI MDM: CPT

## 2023-11-16 PROCEDURE — 80053 COMPREHEN METABOLIC PANEL: CPT

## 2023-11-16 PROCEDURE — 71275 CT ANGIOGRAPHY CHEST: CPT

## 2023-11-16 PROCEDURE — 700102 HCHG RX REV CODE 250 W/ 637 OVERRIDE(OP)

## 2023-11-16 PROCEDURE — 700102 HCHG RX REV CODE 250 W/ 637 OVERRIDE(OP): Performed by: EMERGENCY MEDICINE

## 2023-11-16 PROCEDURE — 84484 ASSAY OF TROPONIN QUANT: CPT | Mod: 91

## 2023-11-16 PROCEDURE — 700117 HCHG RX CONTRAST REV CODE 255: Performed by: EMERGENCY MEDICINE

## 2023-11-16 PROCEDURE — 71045 X-RAY EXAM CHEST 1 VIEW: CPT

## 2023-11-16 PROCEDURE — 93005 ELECTROCARDIOGRAM TRACING: CPT | Performed by: EMERGENCY MEDICINE

## 2023-11-16 PROCEDURE — 81003 URINALYSIS AUTO W/O SCOPE: CPT

## 2023-11-16 PROCEDURE — 700111 HCHG RX REV CODE 636 W/ 250 OVERRIDE (IP): Mod: JZ

## 2023-11-16 RX ORDER — BRIMONIDINE TARTRATE 2 MG/ML
1 SOLUTION/ DROPS OPHTHALMIC 2 TIMES DAILY
COMMUNITY
Start: 2023-09-25

## 2023-11-16 RX ORDER — ASPIRIN 325 MG
325 TABLET ORAL ONCE
Status: COMPLETED | OUTPATIENT
Start: 2023-11-16 | End: 2023-11-16

## 2023-11-16 RX ORDER — ASPIRIN 325 MG
325 TABLET ORAL DAILY
Status: DISCONTINUED | OUTPATIENT
Start: 2023-11-17 | End: 2023-11-17 | Stop reason: HOSPADM

## 2023-11-16 RX ORDER — KETOROLAC TROMETHAMINE 30 MG/ML
15 INJECTION, SOLUTION INTRAMUSCULAR; INTRAVENOUS EVERY 6 HOURS PRN
Status: DISCONTINUED | OUTPATIENT
Start: 2023-11-16 | End: 2023-11-17 | Stop reason: HOSPADM

## 2023-11-16 RX ORDER — ASPIRIN 300 MG/1
300 SUPPOSITORY RECTAL DAILY
Status: DISCONTINUED | OUTPATIENT
Start: 2023-11-17 | End: 2023-11-17 | Stop reason: HOSPADM

## 2023-11-16 RX ORDER — LORAZEPAM 1 MG/1
0.5 TABLET ORAL EVERY 4 HOURS PRN
Status: DISCONTINUED | OUTPATIENT
Start: 2023-11-16 | End: 2023-11-17 | Stop reason: HOSPADM

## 2023-11-16 RX ORDER — LOSARTAN POTASSIUM 50 MG/1
50 TABLET ORAL
Status: DISCONTINUED | OUTPATIENT
Start: 2023-11-16 | End: 2023-11-17 | Stop reason: HOSPADM

## 2023-11-16 RX ORDER — ATORVASTATIN CALCIUM 20 MG/1
20 TABLET, FILM COATED ORAL DAILY
Status: DISCONTINUED | OUTPATIENT
Start: 2023-11-17 | End: 2023-11-17 | Stop reason: HOSPADM

## 2023-11-16 RX ORDER — ACETAMINOPHEN 325 MG/1
650 TABLET ORAL EVERY 6 HOURS PRN
Status: DISCONTINUED | OUTPATIENT
Start: 2023-11-16 | End: 2023-11-17 | Stop reason: HOSPADM

## 2023-11-16 RX ORDER — ASPIRIN 81 MG/1
324 TABLET, CHEWABLE ORAL DAILY
Status: DISCONTINUED | OUTPATIENT
Start: 2023-11-17 | End: 2023-11-17 | Stop reason: HOSPADM

## 2023-11-16 RX ORDER — LATANOPROST 50 UG/ML
1 SOLUTION/ DROPS OPHTHALMIC NIGHTLY
Status: DISCONTINUED | OUTPATIENT
Start: 2023-11-16 | End: 2023-11-17 | Stop reason: HOSPADM

## 2023-11-16 RX ORDER — METOPROLOL SUCCINATE 25 MG/1
25 TABLET, EXTENDED RELEASE ORAL 2 TIMES DAILY
Status: ON HOLD | COMMUNITY
End: 2023-11-17

## 2023-11-16 RX ORDER — LORAZEPAM 2 MG/ML
0.5 INJECTION INTRAMUSCULAR ONCE
Status: COMPLETED | OUTPATIENT
Start: 2023-11-16 | End: 2023-11-16

## 2023-11-16 RX ORDER — HYDRALAZINE HYDROCHLORIDE 20 MG/ML
10 INJECTION INTRAMUSCULAR; INTRAVENOUS ONCE
Status: COMPLETED | OUTPATIENT
Start: 2023-11-16 | End: 2023-11-16

## 2023-11-16 RX ORDER — M-VIT,TX,IRON,MINS/CALC/FOLIC 27MG-0.4MG
1 TABLET ORAL DAILY
COMMUNITY

## 2023-11-16 RX ORDER — HYDRALAZINE HYDROCHLORIDE 20 MG/ML
20 INJECTION INTRAMUSCULAR; INTRAVENOUS EVERY 4 HOURS PRN
Status: DISCONTINUED | OUTPATIENT
Start: 2023-11-16 | End: 2023-11-17 | Stop reason: HOSPADM

## 2023-11-16 RX ORDER — BISACODYL 10 MG
10 SUPPOSITORY, RECTAL RECTAL
Status: DISCONTINUED | OUTPATIENT
Start: 2023-11-16 | End: 2023-11-17 | Stop reason: HOSPADM

## 2023-11-16 RX ORDER — ASPIRIN 81 MG/1
81 TABLET ORAL
COMMUNITY

## 2023-11-16 RX ORDER — AMOXICILLIN 250 MG
2 CAPSULE ORAL 2 TIMES DAILY
Status: DISCONTINUED | OUTPATIENT
Start: 2023-11-16 | End: 2023-11-17 | Stop reason: HOSPADM

## 2023-11-16 RX ORDER — BRIMONIDINE TARTRATE 2 MG/ML
1 SOLUTION/ DROPS OPHTHALMIC 2 TIMES DAILY
Status: DISCONTINUED | OUTPATIENT
Start: 2023-11-16 | End: 2023-11-17 | Stop reason: HOSPADM

## 2023-11-16 RX ORDER — NITROGLYCERIN 0.4 MG/1
0.4 TABLET SUBLINGUAL
Status: DISCONTINUED | OUTPATIENT
Start: 2023-11-16 | End: 2023-11-17 | Stop reason: HOSPADM

## 2023-11-16 RX ORDER — ATORVASTATIN CALCIUM 20 MG/1
20 TABLET, FILM COATED ORAL
Status: DISCONTINUED | OUTPATIENT
Start: 2023-11-16 | End: 2023-11-16

## 2023-11-16 RX ORDER — ASPIRIN 325 MG
325 TABLET ORAL ONCE
Status: DISCONTINUED | OUTPATIENT
Start: 2023-11-16 | End: 2023-11-16

## 2023-11-16 RX ORDER — ENOXAPARIN SODIUM 100 MG/ML
40 INJECTION SUBCUTANEOUS DAILY
Status: DISCONTINUED | OUTPATIENT
Start: 2023-11-16 | End: 2023-11-17 | Stop reason: HOSPADM

## 2023-11-16 RX ORDER — POLYETHYLENE GLYCOL 3350 17 G/17G
1 POWDER, FOR SOLUTION ORAL
Status: DISCONTINUED | OUTPATIENT
Start: 2023-11-16 | End: 2023-11-17 | Stop reason: HOSPADM

## 2023-11-16 RX ADMIN — IOHEXOL 85 ML: 350 INJECTION, SOLUTION INTRAVENOUS at 17:23

## 2023-11-16 RX ADMIN — HYDRALAZINE HYDROCHLORIDE 10 MG: 20 INJECTION, SOLUTION INTRAMUSCULAR; INTRAVENOUS at 12:50

## 2023-11-16 RX ADMIN — LORAZEPAM 0.5 MG: 2 INJECTION INTRAMUSCULAR; INTRAVENOUS at 14:57

## 2023-11-16 RX ADMIN — ASPIRIN 325 MG: 325 TABLET ORAL at 20:27

## 2023-11-16 RX ADMIN — LATANOPROST 1 DROP: 50 SOLUTION OPHTHALMIC at 20:27

## 2023-11-16 RX ADMIN — BRIMONIDINE TARTRATE 1 DROP: 2 SOLUTION OPHTHALMIC at 20:28

## 2023-11-16 RX ADMIN — ENOXAPARIN SODIUM 40 MG: 100 INJECTION SUBCUTANEOUS at 19:31

## 2023-11-16 RX ADMIN — LOSARTAN POTASSIUM 50 MG: 50 TABLET, FILM COATED ORAL at 19:31

## 2023-11-16 ASSESSMENT — LIFESTYLE VARIABLES
EVER HAD A DRINK FIRST THING IN THE MORNING TO STEADY YOUR NERVES TO GET RID OF A HANGOVER: NO
ALCOHOL_USE: NO
HOW MANY TIMES IN THE PAST YEAR HAVE YOU HAD 5 OR MORE DRINKS IN A DAY: 0
DOES PATIENT WANT TO STOP DRINKING: NO
HAVE YOU EVER FELT YOU SHOULD CUT DOWN ON YOUR DRINKING: NO
AVERAGE NUMBER OF DAYS PER WEEK YOU HAVE A DRINK CONTAINING ALCOHOL: 0
HAVE PEOPLE ANNOYED YOU BY CRITICIZING YOUR DRINKING: NO
TOTAL SCORE: 0
CONSUMPTION TOTAL: NEGATIVE
ON A TYPICAL DAY WHEN YOU DRINK ALCOHOL HOW MANY DRINKS DO YOU HAVE: 0
TOTAL SCORE: 0
TOTAL SCORE: 0
EVER FELT BAD OR GUILTY ABOUT YOUR DRINKING: NO

## 2023-11-16 ASSESSMENT — ENCOUNTER SYMPTOMS
PALPITATIONS: 0
NAUSEA: 0
HEADACHES: 1
SHORTNESS OF BREATH: 0
FEVER: 0
HEARTBURN: 0
DIARRHEA: 0
VOMITING: 0
COUGH: 0
ABDOMINAL PAIN: 0
DIZZINESS: 0
CHILLS: 0

## 2023-11-16 ASSESSMENT — FIBROSIS 4 INDEX
FIB4 SCORE: 2.003469213361884652
FIB4 SCORE: 2.003469213361884652

## 2023-11-16 ASSESSMENT — PATIENT HEALTH QUESTIONNAIRE - PHQ9
1. LITTLE INTEREST OR PLEASURE IN DOING THINGS: NOT AT ALL
2. FEELING DOWN, DEPRESSED, IRRITABLE, OR HOPELESS: NOT AT ALL
SUM OF ALL RESPONSES TO PHQ9 QUESTIONS 1 AND 2: 0

## 2023-11-16 NOTE — ED PROVIDER NOTES
ER Provider Note    Scribed for Charline Montenegro M.d. by Jaime White. 11/16/2023  10:54 AM    Primary Care Provider: Kristopher Gonzalez M.D.    CHIEF COMPLAINT  Chief Complaint   Patient presents with    Hypertension     Reports he woke up with chest tightness today, and a SBP in the 190's. Endorses HA. Took baby Asprin this AM due to CP.      EXTERNAL RECORDS REVIEWED  Outpatient Notes:  The patient was seen in the Cardiology office by  on 10/30/23 for abnormal ECHO with an EF of 50% . He has a dilated ascending aorta of 4.4 cm. He has a LYNN scheduled. He was previously on Zebeta for hypertension, but contacted his Cardiology office a few days ago as he noticed his heart rate was low on Zebeta and he was concerned about fatigue and lightheadedness when standing. Cardiology instructed him to begin taking Metoprolol 50 mg BID on 11/14/23.        Outpatient labs & studies:  The patient had a negative stress test in 2019, but no other stress test since then. He last had an ECHO on 10/31/23 with a normal EF and an ascending aorta measuring  4.3 cm.       HPI/ROS  LIMITATION TO HISTORY   Select: : None    OUTSIDE HISTORIAN(S):  None    Tyler Live is a 68 y.o. male who presents to the ED for evaluation of elevated blood pressure, onset today. The patient states he has a history of hypertension and was previously taking Zebeta for the last two weeks. However, he noticed his heart rate was low while taking this medication, despite his blood pressure being relatively stable with only occasional spikes. So, he contacted his Cardiologist two days ago who instructed him to begin taking metoprolol again, 50 mg BID. The patient notes he did not have that much metoprolol at home, so he started taking it last night in 25 mg doses. However, last night he began experiencing associated symptoms of chest tightness, feeling clammy, and a slight throbbing headache. He notes these symptoms are common for him when  his blood pressure is elevated. He then took another dose of metoprolol this morning at approximately 7:00 AM with no alleviation of his symptoms. This along with a high home blood pressure reading, systolic in the 190s, prompted him to present to the ED. While in the ED he states most of his symptoms have resolved after taking a baby aspirin just prior to presenting to the ED, but he still has a mild headache. He denies any fevers, chills, cough, runny nose, sore throat, or unilateral, numbness, tingling, or weakness. He also has a history of high cholesterol and takes a daily statin. He denies any history of diabetes. He does note a family history of hypertension and his father passed from a heart attack in his early 60s, but he has no other known personal or family history of early age heart issues. He does not smoke cigarettes.       PAST MEDICAL HISTORY  Past Medical History:   Diagnosis Date    Hypertension        SURGICAL HISTORY  Past Surgical History:   Procedure Laterality Date    MT TOTAL HIP ARTHROPLASTY Left 11/2/2021    Procedure: ARTHROPLASTY, HIP, TOTAL, ANTERIOR APPROACH;  Surgeon: Costa Pemberton M.D.;  Location: [facility];  Service: Orthopedics    CATARACT EXTRACTION WITH IOL Bilateral     COLONOSCOPY  age 50    neg    EYE SURGERY  age 4    R eye,trauma       FAMILY HISTORY  Family History   Problem Relation Age of Onset    Cancer Father         melanoma    GI Disease Mother     Cancer Sister         breast    Cancer Maternal Grandmother         kidney    Cancer Maternal Grandfather         prostate    Cancer Paternal Grandmother         liver    Cancer Paternal Grandfather         throat    Diabetes Neg Hx     Heart Disease Neg Hx     Stroke Neg Hx        SOCIAL HISTORY   reports that he has never smoked. He has never used smokeless tobacco. He reports that he does not currently use alcohol. He reports that he does not use drugs.    CURRENT MEDICATIONS  Previous  "Medications    ATORVASTATIN (LIPITOR) 20 MG TAB    Take 1 Tablet by mouth every day. N THE EVENING    BISOPROLOL (ZEBETA) 10 MG TABLET    Take 1 Tablet by mouth every day.    LATANOPROST (XALATAN) 0.005 % SOLUTION           ALLERGIES  Patient has no known allergies.    PHYSICAL EXAM  BP (!) 169/88   Pulse (!) 54   Temp 36.3 °C (97.4 °F) (Temporal)   Resp 18   Ht 1.93 m (6' 4\")   Wt 107 kg (235 lb 14.3 oz)   SpO2 97%   BMI 28.71 kg/m²   Constitutional: Well developed, well nourished; No acute distress; Non-toxic appearance.   HENT: Normocephalic, atraumatic; Bilateral external ears normal; Oropharyngeal exam deferred due to COVID-19 outbreak and lack of oropharyngeal complaints.  Eyes: PERRL, EOMI, Conjunctiva normal. No discharge.   Neck:  Supple, nontender midline; No stridor; No nuchal rigidity.   Lymphatic: No cervical lymphadenopathy noted.   Cardiovascular: Regular rate and rhythm without murmurs, rubs, or gallop.   Thorax & Lungs: No respiratory distress, breath sounds clear to auscultation bilaterally without wheezing, rales or rhonchi. Nontender chest wall. No crepitus or subcutaneous air  Abdomen: Soft, nontender, bowel sounds normal. No obvious masses; No pulsatile masses; no rebound, guarding, or peritoneal signs.   Skin: Good color; warm and dry without rash or petechia.  Back: Nontender, No CVA tenderness.   Extremities: Distal radial, dorsalis pedis, posterior tibial pulses are equal bilaterally; No edema; Nontender calves or saphenous, No cyanosis, No clubbing.   Musculoskeletal: Good range of motion in all major joints. No tenderness to palpation or major deformities noted.   Neurologic: Alert & oriented x 4, clear speech.      DIAGNOSTIC STUDIES    Labs:   Results for orders placed or performed during the hospital encounter of 11/16/23   CBC with Differential   Result Value Ref Range    WBC 5.1 4.8 - 10.8 K/uL    RBC 4.72 4.70 - 6.10 M/uL    Hemoglobin 14.6 14.0 - 18.0 g/dL    Hematocrit 43.3 " 42.0 - 52.0 %    MCV 91.7 81.4 - 97.8 fL    MCH 30.9 27.0 - 33.0 pg    MCHC 33.7 32.3 - 36.5 g/dL    RDW 42.5 35.9 - 50.0 fL    Platelet Count 184 164 - 446 K/uL    MPV 9.8 9.0 - 12.9 fL    Neutrophils-Polys 52.80 44.00 - 72.00 %    Lymphocytes 36.70 22.00 - 41.00 %    Monocytes 8.30 0.00 - 13.40 %    Eosinophils 1.60 0.00 - 6.90 %    Basophils 0.40 0.00 - 1.80 %    Immature Granulocytes 0.20 0.00 - 0.90 %    Nucleated RBC 0.00 0.00 - 0.20 /100 WBC    Neutrophils (Absolute) 2.69 1.82 - 7.42 K/uL    Lymphs (Absolute) 1.87 1.00 - 4.80 K/uL    Monos (Absolute) 0.42 0.00 - 0.85 K/uL    Eos (Absolute) 0.08 0.00 - 0.51 K/uL    Baso (Absolute) 0.02 0.00 - 0.12 K/uL    Immature Granulocytes (abs) 0.01 0.00 - 0.11 K/uL    NRBC (Absolute) 0.00 K/uL   Complete Metabolic Panel (CMP)   Result Value Ref Range    Sodium 142 135 - 145 mmol/L    Potassium 4.0 3.6 - 5.5 mmol/L    Chloride 106 96 - 112 mmol/L    Co2 29 20 - 33 mmol/L    Anion Gap 7.0 7.0 - 16.0    Glucose 106 (H) 65 - 99 mg/dL    Bun 11 8 - 22 mg/dL    Creatinine 0.96 0.50 - 1.40 mg/dL    Calcium 9.2 8.5 - 10.5 mg/dL    Correct Calcium 9.1 8.5 - 10.5 mg/dL    AST(SGOT) 23 12 - 45 U/L    ALT(SGPT) 18 2 - 50 U/L    Alkaline Phosphatase 61 30 - 99 U/L    Total Bilirubin 1.1 0.1 - 1.5 mg/dL    Albumin 4.1 3.2 - 4.9 g/dL    Total Protein 6.6 6.0 - 8.2 g/dL    Globulin 2.5 1.9 - 3.5 g/dL    A-G Ratio 1.6 g/dL   Troponins NOW   Result Value Ref Range    Troponin T 9 6 - 19 ng/L   Troponins in two (2) hours   Result Value Ref Range    Troponin T 9 6 - 19 ng/L   ESTIMATED GFR   Result Value Ref Range    GFR (CKD-EPI) 86 >60 mL/min/1.73 m 2   URINALYSIS (UA)    Specimen: Urine   Result Value Ref Range    Color Yellow     Character Clear     Specific Gravity 1.010 <1.035    Ph 7.5 5.0 - 8.0    Glucose Negative Negative mg/dL    Ketones Negative Negative mg/dL    Protein Negative Negative mg/dL    Bilirubin Negative Negative    Urobilinogen, Urine 0.2 Negative    Nitrite Negative  Negative    Leukocyte Esterase Negative Negative    Occult Blood Negative Negative    Micro Urine Req see below    EKG (NOW)   Result Value Ref Range    Report       [facility] Emergency Dept.    Test Date:  2023  Pt Name:    LULU BURTON                Department: ER  MRN:        [id number]                      Room:  Gender:     Male                         Technician: [id number]  :        1955                   Requested By:ER TRIAGE PROTOCOL  Order #:    [id number]                    Reading MD:    Measurements  Intervals                                Axis  Rate:       67                           P:          54  VT:         182                          QRS:        -35  QRSD:       98                           T:          29  QT:         431  QTc:        455    Interpretive Statements  Sinus rhythm  Left axis deviation  Compared to ECG 2020 07:54:23  No significant changes          EKG:   I have independently interpreted this EKG:  EKG interpretation is as below:  Normal sinus rhythm.  Rate of 67.  Normal axis.  Normal intervals.  No ST elevation or depression.    Radiology:   The attending emergency physician has independently interpreted the diagnostic imaging associated with this visit and am waiting the final reading from the radiologist.     Preliminary interpretation is a follows: ER MD is reviewed the patient's chest x-ray.  No obvious infiltrates.    Radiologist interpretation:     CT-CTA COMPLETE THORACOABDOMINAL AORTA   Final Result      1.  Stable ectasia of the ascending aorta measuring 4.5 cm.   2.  No aortic dissection.   3.  Tiny tree-in-bud micronodules within the right upper and middle lobes are probably infectious or postinflammatory.   4.  Tiny nonobstructing right renal stone.            CT-HEAD W/O   Final Result      1. No acute intracranial abnormality.   2. Chronic left maxillary sinus disease with either inspissated mucous or allergic fungal sinusitis.    3. Postsurgical changes involving the globes.   4. Leonard cisterna magna.         DX-CHEST-PORTABLE (1 VIEW)   Final Result      No acute cardiac or pulmonary abnormalities are identified.      NM-CARDIAC STRESS TEST    (Results Pending)        COURSE & MEDICAL DECISION MAKING     ED Observation Status? No; Patient does not meet criteria for ED Observation.     INITIAL ASSESSMENT, COURSE AND PLAN  Care Narrative: Patient presents to the ER with complaint of elevated blood pressure in the 190 systolic range this morning.  The patient was on Zebeta for the last 2 weeks.  However, it was causing him to be bradycardic in the 40s and feel very lightheaded and fatigued.  He spoke with cardiology a couple days ago who told him to go back onto his metoprolol 50 mg twice daily.  Patient restarted his metoprolol last night taking 25 mg.  He said that all night long while he was sleeping he felt like his blood pressure was high.  He was having some headache.  He was feeling uncomfortable.  He said these are symptoms he had before with his elevated blood pressure.  When he woke up this morning he was diaphoretic and had chest pain.  The chest pain lasted about an hour and resolved right before arrival.  Patient was also short of breath with his chest pain.  No nausea or vomiting.  Currently here in the ER the patient is not having chest pain.  He is having a mild headache.  No numbness, tingling weakness of extremities.  At this time no concern for stroke.  CT brain is negative for acute head bleed.  He was given hydralazine with improvement in his blood pressure.  He was also given some Ativan as he was anxious, which likely was also driving his blood pressure a bit.  Patient has a known dilated ascending aorta.  For this reason he underwent a CTA of the thoracic abdominal aorta.  There is no dissection or rupture.  EKG does not reveal any acute ST elevation or depression.  Initial troponin is negative.  At this time I think  the patient needs to be hospitalized for hypertensive emergency/urgency.  With his complaint of chest pain, he may benefit from cardiac rule out as he has not had a stress test since 2019.  He recently had an echocardiogram to evaluate his aortic dilatation.  The aorta appears to be stable on CT today when compared to previous echo.  At this time I think the patient needs to get on a blood pressure regimen that works better for him.  Patient was clearly not tolerating the Zebeta.  Metoprolol does not seem to be helping his blood pressure.  May be we need to entertain a different type of blood pressure medication altogether.  Nonetheless, I think the patient will need to be hospitalized for hypertensive urgency/emergency with chest pain.  I spoke with Peyman, the hospitalist CHETAN, and she will kindly evaluate the patient hospitalization.    10:54 AM - Patient seen and examined at bedside. Discussed plan of care, including a cardiac work-up and possible hospitalization for hypertensive urgency. Patient agrees to the plan of care. Ordered for labs and imaging to evaluate his symptoms.     2:17 PM ordered additional imaging for further evaluation. The patient will be medicated with Ativan injection 0.5 mg at this time.       ADDITIONAL PROBLEM LIST  Problem #1: Elevated blood pressure  Problem #2: Chest pain, diaphoresis and headache today with high blood pressure    DISPOSITION AND DISCUSSIONS  I have discussed management of the patient with the following physicians and NILO's:    Peyman Lima (Hospitalist [facility])     Discussion of management with other Q or appropriate source(s): None     Escalation of care considered, and ultimately not performed: IV fluids.  Patient is not septic or toxic.  He is not vomiting.  He is hypertensive.  He does not appear to be dehydrated.  At this time I do not think he needs IV fluids.    Barriers to care at this time, including but not limited to:  None known at this time .     Decision  tools and prescription drugs considered including, but not limited to: Medication modification patient was given hydralazine here in the ER with some improvement in his blood pressure.  He started feeling anxious he was given some Ativan.  That also seem to help his blood pressure little bit. .    DISPOSITION:  Patient will be hospitalized by Peyman Lima, hospitalist [facility], in guarded condition.     FINAL DIAGNOSIS  1. Hypertensive urgency Acute   2. Chest pain, unspecified type Acute       This dictation has been created using voice recognition software. The accuracy of the dictation is limited by the abilities of the software. I expect there may be some errors of grammar and possibly content. I made every attempt to manually correct the errors within my dictation. However, errors related to voice recognition software may still exist and should be interpreted within the appropriate context.    Jaime LALA (Scribe), am scribing for, and in the presence of, Charline Montenegro M.D..    Electronically signed by: Jaime White (Scribe), 11/16/2023    Charline LALA M.D. personally performed the services described in this documentation, as scribed by Jaime White in my presence, and it is both accurate and complete.     The note accurately reflects work and decisions made by me.  Charline Montenegro M.D.  11/16/2023  7:35 PM

## 2023-11-16 NOTE — ED TRIAGE NOTES
".  Chief Complaint   Patient presents with    Hypertension     Reports he woke up with chest tightness today, and a SBP in the 190's. Endorses HA. Took baby Asprin this AM due to CP.        67 yo male ambulatory to triage for above complaint. EKG completed on arrival. CP protocol ordered. Patient currently reports pain has subsided.     Pt is alert and oriented, speaking in full sentences, follows commands and responds appropriately to questions.      Patient placed back for labs and educated on triage process. Asked to inform RN of any changes or concerns.     BP (!) 183/95   Pulse 70   Temp 36.1 °C (97 °F) (Temporal)   Resp 18   Ht 1.93 m (6' 4\")   Wt 107 kg (235 lb 14.3 oz)   SpO2 97%   BMI 28.71 kg/m²     "

## 2023-11-16 NOTE — ED NOTES
Care assumed at this time.  Pt resting in [facility], auto bp/pulse ox/cardiac monitoring in place.  Pt reports awaiting ERP eval

## 2023-11-17 ENCOUNTER — APPOINTMENT (OUTPATIENT)
Dept: RADIOLOGY | Facility: MEDICAL CENTER | Age: 68
End: 2023-11-17
Payer: MEDICARE

## 2023-11-17 ENCOUNTER — PHARMACY VISIT (OUTPATIENT)
Dept: PHARMACY | Facility: MEDICAL CENTER | Age: 68
End: 2023-11-17
Payer: MEDICARE

## 2023-11-17 VITALS
SYSTOLIC BLOOD PRESSURE: 163 MMHG | HEIGHT: 76 IN | HEART RATE: 60 BPM | RESPIRATION RATE: 16 BRPM | WEIGHT: 215.17 LBS | DIASTOLIC BLOOD PRESSURE: 79 MMHG | BODY MASS INDEX: 26.2 KG/M2 | OXYGEN SATURATION: 96 % | TEMPERATURE: 97.4 F

## 2023-11-17 PROBLEM — I16.0 HYPERTENSIVE URGENCY: Status: ACTIVE | Noted: 2023-11-16

## 2023-11-17 LAB
ANION GAP SERPL CALC-SCNC: 9 MMOL/L (ref 7–16)
BUN SERPL-MCNC: 12 MG/DL (ref 8–22)
CALCIUM SERPL-MCNC: 9 MG/DL (ref 8.5–10.5)
CHLORIDE SERPL-SCNC: 104 MMOL/L (ref 96–112)
CHOLEST SERPL-MCNC: 102 MG/DL (ref 100–199)
CO2 SERPL-SCNC: 26 MMOL/L (ref 20–33)
CREAT SERPL-MCNC: 0.94 MG/DL (ref 0.5–1.4)
ERYTHROCYTE [DISTWIDTH] IN BLOOD BY AUTOMATED COUNT: 42.5 FL (ref 35.9–50)
GFR SERPLBLD CREATININE-BSD FMLA CKD-EPI: 88 ML/MIN/1.73 M 2
GLUCOSE SERPL-MCNC: 100 MG/DL (ref 65–99)
HCT VFR BLD AUTO: 41.7 % (ref 42–52)
HDLC SERPL-MCNC: 36 MG/DL
HGB BLD-MCNC: 14.6 G/DL (ref 14–18)
LDLC SERPL CALC-MCNC: 53 MG/DL
MAGNESIUM SERPL-MCNC: 2 MG/DL (ref 1.5–2.5)
MCH RBC QN AUTO: 31.8 PG (ref 27–33)
MCHC RBC AUTO-ENTMCNC: 35 G/DL (ref 32.3–36.5)
MCV RBC AUTO: 90.8 FL (ref 81.4–97.8)
PLATELET # BLD AUTO: 175 K/UL (ref 164–446)
PMV BLD AUTO: 10 FL (ref 9–12.9)
POTASSIUM SERPL-SCNC: 4.1 MMOL/L (ref 3.6–5.5)
RBC # BLD AUTO: 4.59 M/UL (ref 4.7–6.1)
SODIUM SERPL-SCNC: 139 MMOL/L (ref 135–145)
TRIGL SERPL-MCNC: 64 MG/DL (ref 0–149)
WBC # BLD AUTO: 5.7 K/UL (ref 4.8–10.8)

## 2023-11-17 PROCEDURE — 36415 COLL VENOUS BLD VENIPUNCTURE: CPT

## 2023-11-17 PROCEDURE — RXMED WILLOW AMBULATORY MEDICATION CHARGE: Performed by: STUDENT IN AN ORGANIZED HEALTH CARE EDUCATION/TRAINING PROGRAM

## 2023-11-17 PROCEDURE — 83735 ASSAY OF MAGNESIUM: CPT

## 2023-11-17 PROCEDURE — 78452 HT MUSCLE IMAGE SPECT MULT: CPT

## 2023-11-17 PROCEDURE — 99239 HOSP IP/OBS DSCHRG MGMT >30: CPT | Performed by: STUDENT IN AN ORGANIZED HEALTH CARE EDUCATION/TRAINING PROGRAM

## 2023-11-17 PROCEDURE — G0378 HOSPITAL OBSERVATION PER HR: HCPCS

## 2023-11-17 PROCEDURE — 80048 BASIC METABOLIC PNL TOTAL CA: CPT

## 2023-11-17 PROCEDURE — 700102 HCHG RX REV CODE 250 W/ 637 OVERRIDE(OP)

## 2023-11-17 PROCEDURE — 85027 COMPLETE CBC AUTOMATED: CPT

## 2023-11-17 PROCEDURE — RXOTC WILLOW AMBULATORY OTC CHARGE: Performed by: PHARMACIST

## 2023-11-17 PROCEDURE — 80061 LIPID PANEL: CPT

## 2023-11-17 PROCEDURE — A9270 NON-COVERED ITEM OR SERVICE: HCPCS

## 2023-11-17 RX ORDER — REGADENOSON 0.08 MG/ML
0.4 INJECTION, SOLUTION INTRAVENOUS ONCE
Status: DISCONTINUED | OUTPATIENT
Start: 2023-11-17 | End: 2023-11-17 | Stop reason: HOSPADM

## 2023-11-17 RX ORDER — LOSARTAN POTASSIUM 50 MG/1
50 TABLET ORAL DAILY
Qty: 30 TABLET | Refills: 0 | Status: SHIPPED | OUTPATIENT
Start: 2023-11-18 | End: 2023-11-29 | Stop reason: SDUPTHER

## 2023-11-17 RX ORDER — AMINOPHYLLINE 25 MG/ML
100 INJECTION, SOLUTION INTRAVENOUS
Status: DISCONTINUED | OUTPATIENT
Start: 2023-11-17 | End: 2023-11-17 | Stop reason: HOSPADM

## 2023-11-17 RX ORDER — REGADENOSON 0.08 MG/ML
INJECTION, SOLUTION INTRAVENOUS
Status: DISCONTINUED
Start: 2023-11-17 | End: 2023-11-17 | Stop reason: HOSPADM

## 2023-11-17 RX ADMIN — ASPIRIN 325 MG: 325 TABLET ORAL at 05:08

## 2023-11-17 RX ADMIN — BRIMONIDINE TARTRATE 1 DROP: 2 SOLUTION OPHTHALMIC at 05:08

## 2023-11-17 RX ADMIN — ATORVASTATIN CALCIUM 20 MG: 20 TABLET, FILM COATED ORAL at 05:08

## 2023-11-17 ASSESSMENT — PAIN DESCRIPTION - PAIN TYPE: TYPE: ACUTE PAIN

## 2023-11-17 NOTE — PROGRESS NOTES
4 Eyes Skin Assessment Completed by LYUBOV Marcum and LYUBOV Stark.    Head WDL  Ears WDL  Nose WDL  Mouth WDL  Neck WDL  Breast/Chest WDL  Shoulder Blades WDL  Spine WDL  (R) Arm/Elbow/Hand WDL  (L) Arm/Elbow/Hand WDL  Abdomen WDL  Groin WDL  Scrotum/Coccyx/Buttocks WDL  (R) Leg WDL  (L) Leg WDL  (R) Heel/Foot/Toe WDL  (L) Heel/Foot/Toe WDL          Devices In Places Tele Box and Pulse Ox      Interventions In Place Pillows    Possible Skin Injury No    Pictures Uploaded Into Epic N/A  Wound Consult Placed N/A  RN Wound Prevention Protocol Ordered No

## 2023-11-17 NOTE — ASSESSMENT & PLAN NOTE
The ASCVD Risk score (Maddie WAGONER, et al., 2019) failed to calculate for the following reasons:    The valid total cholesterol range is 130 to 320 mg/dL  -Patient experiencing chest pain with administration of Metroprolol last night  -Troponin negative, trend  -Telemetry monitoring  -Vitals Q4  -Recent echo October 2023 showing 60% EF, WDL  - Aspirin  - Control blood pressure  - Stress test in a.m.

## 2023-11-17 NOTE — PROGRESS NOTES
Discharge orders received.  Patient arrived to the discharge lounge.  PIV removed. Patient denies that we took any home medications from him upon admit. Instructions given, medications reviewed and general discharge education provided to patient.  Follow up appointments discussed.  Patient verbalized understanding of dc instructions and prescriptions.  Patient signed discharge instructions.  Patient verbalized he had all belongings with him.  Patient left via car to home.  Wished patient a speedy recovery.

## 2023-11-17 NOTE — H&P
Hospital Medicine History & Physical Note    Date of Service  11/16/2023    Primary Care Physician  Kristopher Gonzalez M.D.    Consultants  none    Specialist Names: N/a    Code Status  Full Code    Chief Complaint  Chief Complaint   Patient presents with    Hypertension     Reports he woke up with chest tightness today, and a SBP in the 190's. Endorses HA. Took baby Asprin this AM due to CP.        History of Presenting Illness  Tyler Live is a 68 y.o. male who presented 11/16/2023 with elevated blood pressure, onset today. The patient states he has a history of hypertension and was previously taking Zebeta. However, he noticed his heart rate was low while taking this medication, despite his blood pressure being relatively stable with only occasional spikes. So, he contacted his Cardiologist two days ago who instructed him to begin taking metoprolol again, 50 mg BID. The patient notes he did not have that much metoprolol at home, so he started taking it last night in 25 mg doses. However, last night he began experiencing associated symptoms of chest tightness, feeling clammy, and a slight throbbing headache. He notes these symptoms are common for him when his blood pressure is elevated. He then took another dose of metoprolol this morning at approximately 7:00 AM with no alleviation of his symptoms. This along with a high home blood pressure reading, systolic in the 190s, prompted him to present to the ED. While in the ED he states most of his symptoms have resolved after taking a baby aspirin just prior to presenting to the ED, but he still has a mild headache.    In the ED, afebrile, his blood pressure is still elevated, 1 dose of hydralazine did bring it down shortly, but it is back up again.  He is on room air.  Labs are largely unremarkable including troponin that is negative x2.  Chest x-ray negative.  EKG showing sinus rhythm.  CAT scan of head is negative.  CTA reveals stable ectasia of the  ascending aorta measuring 4.5 cm.  Patient will be admitted for blood pressure control and stress test, further cardiac work-up.      I discussed the plan of care with patient and Dr Fofana .    Review of Systems  Review of Systems   Constitutional:  Negative for chills, fever and malaise/fatigue.   Respiratory:  Negative for cough and shortness of breath.    Cardiovascular:  Positive for chest pain. Negative for palpitations and leg swelling.   Gastrointestinal:  Negative for abdominal pain, diarrhea, heartburn, nausea and vomiting.   Genitourinary:  Negative for dysuria, frequency and urgency.   Neurological:  Positive for headaches. Negative for dizziness.   All other systems reviewed and are negative.      Past Medical History   has a past medical history of Hypertension.    Surgical History   has a past surgical history that includes eye surgery (age 4); colonoscopy (age 50); cataract extraction with iol (Bilateral); and pr total hip arthroplasty (Left, 11/2/2021).     Family History  family history includes Cancer in his father, maternal grandfather, maternal grandmother, paternal grandfather, paternal grandmother, and sister; GI Disease in his mother.   Family history reviewed with patient. There is no family history that is pertinent to the chief complaint.     Social History   reports that he has never smoked. He has never used smokeless tobacco. He reports that he does not currently use alcohol. He reports that he does not use drugs.    Allergies  No Known Allergies    Medications  Prior to Admission Medications   Prescriptions Last Dose Informant Patient Reported? Taking?   GARLIC PO 11/15/2023 at AM Patient Yes Yes   Sig: Take 1 Capsule by mouth every day.   Turmeric (QC TUMERIC COMPLEX PO) 11/15/2023 at AM Patient Yes Yes   Sig: Take 1 Capsule by mouth every day.   aspirin 81 MG EC tablet 11/16/2023 at AM Patient Yes Yes   Sig: Take 81 mg by mouth every day.   atorvastatin (LIPITOR) 20 MG Tab 11/15/2023  at AM Patient No No   Sig: Take 1 Tablet by mouth every day. N THE EVENING   bisoprolol (ZEBETA) 10 MG tablet 11/14/2023 at AM Patient No No   Sig: Take 1 Tablet by mouth every day.   brimonidine (ALPHAGAN) 0.2 % Solution 11/15/2023 at PM Patient Yes Yes   Sig: Administer 1 Drop into the right eye 2 times a day.   latanoprost (XALATAN) 0.005 % Solution 11/15/2023 at PM Patient Yes No   Sig: Administer 1 Drop into the right eye every evening.   metoprolol SR (TOPROL XL) 25 MG TABLET SR 24 HR 11/16/2023 at AM Patient Yes Yes   Sig: Take 25 mg by mouth 2 times a day.   therapeutic multivitamin-minerals (THERAGRAN-M) Tab 11/15/2023 at AM Patient Yes Yes   Sig: Take 1 Tablet by mouth every day.      Facility-Administered Medications: None       Physical Exam  Temp:  [36.1 °C (97 °F)-36.3 °C (97.4 °F)] 36.3 °C (97.4 °F)  Pulse:  [50-70] 63  Resp:  [12-19] 13  BP: (139-194)/() 176/84  SpO2:  [94 %-98 %] 97 %  Blood Pressure : 139/77   Temperature: 36.3 °C (97.4 °F)   Pulse: (!) 57   Respiration: 12   Pulse Oximetry: 97 %       Physical Exam  Vitals and nursing note reviewed.   Constitutional:       Appearance: Normal appearance. He is not ill-appearing.   HENT:      Head: Normocephalic and atraumatic.      Jaw: There is normal jaw occlusion.      Right Ear: Hearing normal.      Left Ear: Hearing normal.      Nose: Nose normal.      Mouth/Throat:      Lips: Pink.      Mouth: Mucous membranes are moist.   Eyes:      Extraocular Movements: Extraocular movements intact.      Conjunctiva/sclera: Conjunctivae normal.      Pupils: Pupils are equal, round, and reactive to light.   Neck:      Vascular: No carotid bruit.   Cardiovascular:      Rate and Rhythm: Normal rate and regular rhythm.      Pulses: Normal pulses.      Heart sounds: Normal heart sounds, S1 normal and S2 normal.   Pulmonary:      Effort: Pulmonary effort is normal.      Breath sounds: Normal breath sounds and air entry. No stridor.   Musculoskeletal:       "Cervical back: Normal range of motion and neck supple.      Right lower leg: No edema.      Left lower leg: No edema.   Skin:     General: Skin is warm and dry.      Capillary Refill: Capillary refill takes less than 2 seconds.   Neurological:      General: No focal deficit present.      Mental Status: He is alert and oriented to person, place, and time. Mental status is at baseline.      Sensory: Sensation is intact.      Motor: Motor function is intact.   Psychiatric:         Attention and Perception: Attention and perception normal.         Mood and Affect: Mood and affect normal.         Speech: Speech normal.         Behavior: Behavior normal. Behavior is cooperative.         Laboratory:  Recent Labs     11/16/23  0732   WBC 5.1   RBC 4.72   HEMOGLOBIN 14.6   HEMATOCRIT 43.3   MCV 91.7   MCH 30.9   MCHC 33.7   RDW 42.5   PLATELETCT 184   MPV 9.8     Recent Labs     11/16/23  0732   SODIUM 142   POTASSIUM 4.0   CHLORIDE 106   CO2 29   GLUCOSE 106*   BUN 11   CREATININE 0.96   CALCIUM 9.2     Recent Labs     11/16/23  0732   ALTSGPT 18   ASTSGOT 23   ALKPHOSPHAT 61   TBILIRUBIN 1.1   GLUCOSE 106*         No results for input(s): \"NTPROBNP\" in the last 72 hours.      Recent Labs     11/16/23  0732 11/16/23  1048   TROPONINT 9 9       Imaging:  CT-CTA COMPLETE THORACOABDOMINAL AORTA   Final Result      1.  Stable ectasia of the ascending aorta measuring 4.5 cm.   2.  No aortic dissection.   3.  Tiny tree-in-bud micronodules within the right upper and middle lobes are probably infectious or postinflammatory.   4.  Tiny nonobstructing right renal stone.            CT-HEAD W/O   Final Result      1. No acute intracranial abnormality.   2. Chronic left maxillary sinus disease with either inspissated mucous or allergic fungal sinusitis.   3. Postsurgical changes involving the globes.   4. Leonard cisterna magna.         DX-CHEST-PORTABLE (1 VIEW)   Final Result      No acute cardiac or pulmonary abnormalities are " identified.      NM-CARDIAC STRESS TEST    (Results Pending)       X-Ray:  I have personally reviewed the images and compared with prior images.  EKG:  I have personally reviewed the images and compared with prior images.    Assessment/Plan:  Justification for Admission Status  I anticipate this patient is appropriate for observation status at this time because chest pain rule out    Patient will need a Telemetry bed on EMERGENCY service .  The need is secondary to chest pain rule out.    * Hypertensive emergency- (present on admission)  Assessment & Plan  - Patient previously taking Zebeta at home but noticed heart rate was low while taking the medication.  His BP is normally very stable.  - He contacted cardiology, Dr. Rosa, 2 days ago who instructed him to begin taking metoprolol again 50 mg twice daily.  - He did not have much metoprolol at home so he began taking 25 mg doses starting last night.  - He began experiencing associated symptoms of chest tightness and clammy with a throbbing headache.  He took another dose of metoprolol this morning and experienced the same symptoms.  Systolic blood pressure was in the 190s, so he presented to the ED.  -Started patient on losartan 50 mg daily.  -IV hydralazine as needed.      Chest pain  Assessment & Plan  The ASCVD Risk score ([place] DK, et al., 2019) failed to calculate for the following reasons:    The valid total cholesterol range is 130 to 320 mg/dL  -Patient experiencing chest pain with administration of Metroprolol last night  -Troponin negative, trend  -Telemetry monitoring  -Vitals Q4  -Recent echo October 2023 showing 60% EF, WDL  - Aspirin  - Control blood pressure  - Stress test in a.m.      Headache  Assessment & Plan  - Patient is complaining of headache since last night  - Most likely due to poor blood pressure control  -Tylenol as needed, IV Toradol if Tylenol ineffective    Ascending aorta dilatation (HCC)- (present on admission)  Assessment & Plan  -  Known history of ascending aorta dilation  - CTA in ED shows stable ascending aorta dilation    Dyslipidemia- (present on admission)  Assessment & Plan  - Continue home atorvastatin        VTE prophylaxis: enoxaparin ppx

## 2023-11-17 NOTE — ED NOTES
Med rec completed per patient  Allergies reviewed  No PO Antibiotics in the last 30 days   No Anticoagulants in the last 30 days    Preferred Pharmacy: [facility] [address]    Patient states that he was on Metoprolol Succinate 25 mg once daily for one year. He states two weeks ago his blood pressure spiked and his doctor switched him to bisoprolol once a day, when his blood pressure began spiking again, yesterday his doctor told him to start taking Metoprolol 50 mg twice daily. Patient is unsure if this was Succinate or Tartrate, however he states he has not been able to  the medication yet, and so yesterday took Metoprolol Succinate 25 mg twice, and intended to do it again today, with his last dose being this morning.     Patient also states he does not normally take Aspirin 81 mg and only takes it when having chest pains.

## 2023-11-17 NOTE — DISCHARGE SUMMARY
Discharge Summary    CHIEF COMPLAINT ON ADMISSION  Chief Complaint   Patient presents with    Hypertension     Reports he woke up with chest tightness today, and a SBP in the 190's. Endorses HA. Took baby Asprin this AM due to CP.        Reason for Admission  HTN urgency and chest pain observation    Admission Date  11/16/2023    CODE STATUS  Full Code    HPI & HOSPITAL COURSE  Tyler Live is a 68 y.o. male who presented 11/16/2023 with elevated blood pressure, onset today. The patient states he has a history of hypertension and was previously taking Zebeta. However, he noticed his heart rate was low while taking this medication, despite his blood pressure being relatively stable with only occasional spikes. So, he contacted his Cardiologist two days ago who instructed him to begin taking metoprolol again, 50 mg BID. The patient notes he did not have that much metoprolol at home, so he started taking it last night in 25 mg doses. However, last night he began experiencing associated symptoms of chest tightness, feeling clammy, and a slight throbbing headache. He notes these symptoms are common for him when his blood pressure is elevated. He then took another dose of metoprolol this morning at approximately 7:00 AM with no alleviation of his symptoms. This along with a high home blood pressure reading, systolic in the 190s, prompted him to present to the ED. While in the ED he states most of his symptoms have resolved after taking a baby aspirin just prior to presenting to the ED, but he still has a mild headache.     In the ED, afebrile, his blood pressure is still elevated, 1 dose of hydralazine did bring it down shortly, but it is back up again.  He is on room air.  Labs are largely unremarkable including troponin that is negative x2.  Chest x-ray negative.  EKG showing sinus rhythm.  CAT scan of head is negative.  CTA reveals stable ectasia of the ascending aorta measuring 4.5 cm.  Patient will be  admitted for blood pressure control and stress test, further cardiac work-up.    Hospital course: afebrile and vitals wnl. Exam at bedside was grossly unremarkable. BP reviewed. Labs and imagings findings discussed with patient. NST was without reversible defects though have a small non-reversible defect. Recent echo about 2 weeks ago reviewed: normal LVEF, no wall motion abnormalities and no significant valvular diseases. At this point, ACS is unlikely. The need to control BP and life style modifications discussed. Losartan added as 1st line anti-hypertensive. Regarding an accidental finding of chronic sinusitis, I recommend following up with PCP and a possible ENT.     Therefore, he is discharged in fair and stable condition to home with close outpatient follow-up.    The patient recovered much more quickly than anticipated on admission.    Discharge Date  11/17/23    FOLLOW UP ITEMS POST DISCHARGE  N/A    DISCHARGE DIAGNOSES  Principal Problem:    Hypertensive urgency (POA: Yes)  Active Problems:    Ascending aorta dilatation (HCC) (POA: Yes)    Dyslipidemia (POA: Yes)    Chest pain (POA: Yes)    Headache (POA: Yes)  Resolved Problems:    * No resolved hospital problems. *      FOLLOW UP  Kristopher Gonzalez M.D.  [address] [place] [id number]  [phone]    Call in 1 week(s)        MEDICATIONS ON DISCHARGE     Medication List        START taking these medications        Instructions   losartan 50 MG Tabs  Start taking on: November 18, 2023  Commonly known as: Cozaar   Take 1 Tablet by mouth every day.  Dose: 50 mg     metoprolol tartrate 25 MG Tabs  Commonly known as: Lopressor   Take 1 Tablet by mouth 2 times a day.  Dose: 25 mg            CONTINUE taking these medications        Instructions   aspirin 81 MG EC tablet   Take 81 mg by mouth 1 time a day as needed (Chest Pains).  Dose: 81 mg     atorvastatin 20 MG Tabs  Commonly known as: Lipitor   Take 1 Tablet by mouth every day. N THE  EVENING  Dose: 20 mg     brimonidine 0.2 % Soln  Commonly known as: Alphagan   Administer 1 Drop into the right eye 2 times a day.  Dose: 1 Drop     GARLIC PO   Take 1 Capsule by mouth every day.  Dose: 1 Capsule     latanoprost 0.005 % Soln  Commonly known as: Xalatan   Administer 1 Drop into the right eye every evening.  Dose: 1 Drop     QC TUMERIC COMPLEX PO   Take 1 Capsule by mouth every day.  Dose: 1 Capsule     therapeutic multivitamin-minerals Tabs   Take 1 Tablet by mouth every day.  Dose: 1 Tablet            STOP taking these medications      bisoprolol 10 MG tablet  Commonly known as: Zebeta     metoprolol SR 25 MG Tb24  Commonly known as: Toprol XL              Allergies  No Known Allergies    DIET  Orders Placed This Encounter   Procedures    Diet Order Diet: Cardiac     Standing Status:   Standing     Number of Occurrences:   1     Order Specific Question:   Diet:     Answer:   Cardiac [6]       ACTIVITY  As tolerated.  Weight bearing as tolerated    CONSULTATIONS  N/A    PROCEDURES  N/A    LABORATORY  Lab Results   Component Value Date    SODIUM 139 11/17/2023    POTASSIUM 4.1 11/17/2023    CHLORIDE 104 11/17/2023    CO2 26 11/17/2023    GLUCOSE 100 (H) 11/17/2023    BUN 12 11/17/2023    CREATININE 0.94 11/17/2023    CREATININE 1.2 09/03/2008        Lab Results   Component Value Date    WBC 5.7 11/17/2023    HEMOGLOBIN 14.6 11/17/2023    HEMATOCRIT 41.7 (L) 11/17/2023    PLATELETCT 175 11/17/2023        Total time of the discharge process exceeds 36 minutes.

## 2023-11-17 NOTE — ASSESSMENT & PLAN NOTE
- Patient previously taking Zebeta at home but noticed heart rate was low while taking the medication.  His BP is normally very stable.  - He contacted cardiology, Dr. Rosa, 2 days ago who instructed him to begin taking metoprolol again 50 mg twice daily.  - He did not have much metoprolol at home so he began taking 25 mg doses starting last night.  - He began experiencing associated symptoms of chest tightness and clammy with a throbbing headache.  He took another dose of metoprolol this morning and experienced the same symptoms.  Systolic blood pressure was in the 190s, so he presented to the ED.  -Started patient on losartan 50 mg daily.  -IV hydralazine as needed.

## 2023-11-17 NOTE — PROGRESS NOTES
Monitor summary: SB/SR 56-62, IN 0.17, QRS 0.08, QT 0.44, no ectopy per strip from monitor room.

## 2023-11-17 NOTE — CARE PLAN
The patient is Stable - Low risk of patient condition declining or worsening    Shift Goals  Clinical Goals: NPO at midnight for stress test, tele monitoring  Patient Goals: Rest  Family Goals: Rest    Patient progressing towards the following goals:    Problem: Knowledge Deficit - Standard  Goal: Patient and family/care givers will demonstrate understanding of plan of care, disease process/condition, diagnostic tests and medications  Outcome: Progressing     Problem: Pain - Standard  Goal: Alleviation of pain or a reduction in pain to the patient’s comfort goal  Outcome: Progressing

## 2023-11-17 NOTE — ASSESSMENT & PLAN NOTE
- Patient is complaining of headache since last night  - Most likely due to poor blood pressure control  -Tylenol as needed, IV Toradol if Tylenol ineffective

## 2023-11-17 NOTE — PROGRESS NOTES
Received report from day shift RN. Assumed patient care. Patient resting in bed, NAD, A&Ox4, VSS. Denies HA, pain, SOB, or dizziness. Tele monitor in place. POC discussed with patient and wife, all questions addressed. Call light within reach.

## 2023-11-18 ENCOUNTER — PATIENT MESSAGE (OUTPATIENT)
Dept: CARDIOLOGY | Facility: MEDICAL CENTER | Age: 68
End: 2023-11-18
Payer: MEDICARE

## 2023-11-19 LAB — MAGNESIUM SERPL-MCNC: 2.1 MG/DL (ref 1.5–2.5)

## 2023-11-20 ENCOUNTER — PATIENT OUTREACH (OUTPATIENT)
Dept: MEDICAL GROUP | Facility: MEDICAL CENTER | Age: 68
End: 2023-11-20
Payer: MEDICARE

## 2023-11-21 PROCEDURE — RXMED WILLOW AMBULATORY MEDICATION CHARGE: Performed by: STUDENT IN AN ORGANIZED HEALTH CARE EDUCATION/TRAINING PROGRAM

## 2023-11-21 NOTE — TELEPHONE ENCOUNTER
To: BABATUNDE    Please see patient's message below regarding medications. He is asking which Metoprolol to take and if we can provide refill from most recent hospitalization. His metoprolol was changed from Metoprolol ER to Metoprolol Tartrate. Please advise on medication and dosing as TT is OOO. Thank you    Attached medications from D/C  instructions.     START taking these medications         Instructions   losartan 50 MG Tabs  Start taking on: November 18, 2023  Commonly known as: Cozaar    Take 1 Tablet by mouth every day.  Dose: 50 mg      metoprolol tartrate 25 MG Tabs  Commonly known as: Lopressor    Take 1 Tablet by mouth 2 times a day.  Dose: 25 mg                CONTINUE taking these medications         Instructions   aspirin 81 MG EC tablet    Take 81 mg by mouth 1 time a day as needed (Chest Pains).  Dose: 81 mg      atorvastatin 20 MG Tabs  Commonly known as: Lipitor    Take 1 Tablet by mouth every day. N THE EVENING  Dose: 20 mg      brimonidine 0.2 % Soln  Commonly known as: Alphagan    Administer 1 Drop into the right eye 2 times a day.  Dose: 1 Drop      GARLIC PO    Take 1 Capsule by mouth every day.  Dose: 1 Capsule      latanoprost 0.005 % Soln  Commonly known as: Xalatan    Administer 1 Drop into the right eye every evening.  Dose: 1 Drop      QC TUMERIC COMPLEX PO    Take 1 Capsule by mouth every day.  Dose: 1 Capsule      therapeutic multivitamin-minerals Tabs    Take 1 Tablet by mouth every day.  Dose: 1 Tablet                STOP taking these medications       bisoprolol 10 MG tablet  Commonly known as: Zebeta      metoprolol SR 25 MG Tb24  Commonly known as: Toprol XL

## 2023-11-21 NOTE — PROGRESS NOTES
Transitional Care Management  TCM Outreach Date and Time: Filed (11/20/2023 11:43 AM)    Discharge Questions  Actual Discharge Date: 11/17/23  Now that you are home, how are you feeling?: Good (systolic 120-140s; HR 50-60s)  Did you receive any new prescriptions?: Yes  Were you able to get them filled?: Yes  Meds to Bed or Pharmacy filled?: Pharmacy  Do you have any questions about your current medications or new medications (Review Med Rec)?: Yes (Please explain) (new rx for Metoprolol was not at the pharmacy. He has prior rx of metoprolol SR that he has been taking BID. He reached out to cardioolgy and has not received a response, I can see the information has been sent to the provider.)  Do you have a follow up appointment scheduled with your PCP?: Yes  Appointment Date: 11/29/23  Appointment Time: 1300  Any issues or paperwork you wish to discuss with your PCP?: Yes (Please specify) (indcidental finding on head CT of chronic sinusitis--possible ENT referral)  Does this patient qualify for the CCM program?: No    Transitional Care  Number of attempts made to contact patient: 2  Current or previous attempts competed within two business days of discharge? : Yes  Provided education regarding treatment plan, medications, self-management, ADLs?: Yes (continue to take metoprolol SR as previously prescribed until he receives a response, as this is controlling his BP and HR. He is out of town-cannot  at pharmacy; took BP cuff and meds with him.)  Has patient completed an Advanced Directive?: No  Has the Care Manager's phone number provided?: No  Is there anything else I can help you with?: Yes (Please specify) (help to get clarificaiton on Metoprolol; [facility] pharmacist will reach out to Dr. Tracy. I also updated LYUBOV Hawkins w/ cardiology who has been working on this on her end.)    Discharge Summary  Chief Complaint: Hypertension  Admitting Diagnosis: Hypertensive emergency (I16.1)  Discharge Diagnosis:  Hypertensive Urgency

## 2023-11-22 NOTE — PATIENT COMMUNICATION
Phone Number Called: [phone]     Call outcome: Spoke to patient regarding message below.    Message: Called to inquire about blood pressures. Patient reports that his blood pressures have been 120s-130s/70s-80s, heart rate is usually 50s-65s. Patient denies dizziness/lightheadedness, fatigue ext.       Patient reports that he has been taking Metoprolol ER 25 mg twice a day as he does not have the Metoprolol Tartrate. Patient reports that he is curious if he should be taking Tartrate or Extended. Patient reports he is in [facility] and is asking if he should continue taking Metoprolol ER or Metoprolol Tartrate. RN informed patient that medication can be sent to pharmacy near him. Patient verbalized understanding. ER precautions advised. RN to reach out for clarification on medication.

## 2023-11-22 NOTE — TELEPHONE ENCOUNTER
To: Scheduling    Please call patient to schedule follow up as soon as possible regarding recent hospitalization and medications. Thank you!

## 2023-11-22 NOTE — TELEPHONE ENCOUNTER
His blood pressure appears to be stable, he can stay on his metoprolol ER twice a day for now, he does not need to switch at this time.  Please make sure he schedules a follow-up

## 2023-11-26 ENCOUNTER — PHARMACY VISIT (OUTPATIENT)
Dept: PHARMACY | Facility: MEDICAL CENTER | Age: 68
End: 2023-11-26
Payer: MEDICARE

## 2023-11-29 ENCOUNTER — OFFICE VISIT (OUTPATIENT)
Dept: MEDICAL GROUP | Facility: MEDICAL CENTER | Age: 68
End: 2023-11-29
Payer: MEDICARE

## 2023-11-29 VITALS
DIASTOLIC BLOOD PRESSURE: 68 MMHG | BODY MASS INDEX: 28.86 KG/M2 | SYSTOLIC BLOOD PRESSURE: 140 MMHG | HEIGHT: 76 IN | HEART RATE: 59 BPM | TEMPERATURE: 97.6 F | OXYGEN SATURATION: 96 % | RESPIRATION RATE: 16 BRPM | WEIGHT: 237 LBS

## 2023-11-29 DIAGNOSIS — I77.810 ASCENDING AORTA DILATATION (HCC): ICD-10-CM

## 2023-11-29 DIAGNOSIS — R94.39 ABNORMAL CARDIOVASCULAR STRESS TEST: ICD-10-CM

## 2023-11-29 DIAGNOSIS — Z09 HOSPITAL DISCHARGE FOLLOW-UP: ICD-10-CM

## 2023-11-29 DIAGNOSIS — F51.04 CHRONIC INSOMNIA: ICD-10-CM

## 2023-11-29 DIAGNOSIS — I1A.0 RESISTANT HYPERTENSION: ICD-10-CM

## 2023-11-29 PROCEDURE — 3078F DIAST BP <80 MM HG: CPT | Performed by: FAMILY MEDICINE

## 2023-11-29 PROCEDURE — 3077F SYST BP >= 140 MM HG: CPT | Performed by: FAMILY MEDICINE

## 2023-11-29 PROCEDURE — 99214 OFFICE O/P EST MOD 30 MIN: CPT | Performed by: FAMILY MEDICINE

## 2023-11-29 RX ORDER — TRAZODONE HYDROCHLORIDE 50 MG/1
50 TABLET ORAL NIGHTLY
Qty: 90 TABLET | Refills: 3 | Status: SHIPPED | OUTPATIENT
Start: 2023-11-29 | End: 2023-12-05

## 2023-11-29 RX ORDER — LOSARTAN POTASSIUM 50 MG/1
75 TABLET ORAL DAILY
Qty: 135 TABLET | Refills: 1 | Status: SHIPPED | OUTPATIENT
Start: 2023-11-29 | End: 2023-12-26

## 2023-11-29 ASSESSMENT — FIBROSIS 4 INDEX: FIB4 SCORE: 2.11

## 2023-11-29 NOTE — PROGRESS NOTES
CC: Hospital discharge follow-up    HPI:   Tyler presents today for posthospitalization follow-up.    Patient was admitted to [facility] on 11/16/2023.  Presented with high blood pressure.  In the hospital 1 dose of hydralazine did bring it down shortly, but it is back up again.  Labs were unremarkable including troponin that is negative x2.  Chest x-ray negative.  EKG showing sinus rhythm.  CAT scan of head is negative.  CTA reveals stable ectasia of the ascending aorta measuring 4.5 cm.  Patient will be admitted for blood pressure control and stress test, further cardiac work-up.     NST was without reversible defects though have a small non-reversible defect. Recent echo about 2 weeks ago reviewed: normal LVEF, no wall motion abnormalities and no significant valvular diseases. At this point, ACS is unlikely. The need to control BP and life style modifications discussed.  Losartan 50 mg daily was added.  Patient was discharged on 11/17/2023 in stable condition.    Currently for follow-up.  He has been taking his blood pressure multiple times at the end has been always high when he wakes up and before he sleeps.  He is currently on losartan 50 mg daily and metoprolol 25 mg twice a day.  Denies any headache, chest pain, shortness of breath.  Patient has an appointment with his cardiologist next week.  Patient Active Problem List    Diagnosis Date Noted    Hypertensive urgency 11/16/2023    Chest pain 11/16/2023    Headache 11/16/2023    Benign prostatic hyperplasia with incomplete bladder emptying 04/03/2023    Arachnoid cyst of posterior cranial fossa 04/03/2023    Primary open angle glaucoma (POAG) of both eyes 04/03/2023    Primary osteoarthritis of left hip 10/20/2021    Cervical radiculopathy 06/05/2020    Gastroesophageal reflux disease without esophagitis 06/05/2020    Adenomatous polyp of sigmoid colon 06/05/2020    Dyslipidemia 08/01/2019    Ascending aorta dilatation (HCC) 08/08/2017    [place]  "disease 07/27/2017    Insomnia due to medical condition 02/17/2015    Eczema 02/17/2015       Current Outpatient Medications   Medication Sig Dispense Refill    traZODone (DESYREL) 50 MG Tab Take 1 Tablet by mouth every evening. 90 Tablet 3    losartan (COZAAR) 50 MG Tab Take 1.5 Tablets by mouth every day. 135 Tablet 1    metoprolol tartrate (LOPRESSOR) 25 MG Tab Take 1 Tablet by mouth 2 times a day. 60 Tablet 0    brimonidine (ALPHAGAN) 0.2 % Solution Administer 1 Drop into the right eye 2 times a day.      aspirin 81 MG EC tablet Take 81 mg by mouth 1 time a day as needed (Chest Pains).      therapeutic multivitamin-minerals (THERAGRAN-M) Tab Take 1 Tablet by mouth every day.      Turmeric (QC TUMERIC COMPLEX PO) Take 1 Capsule by mouth every day.      atorvastatin (LIPITOR) 20 MG Tab Take 1 Tablet by mouth every day. N THE EVENING 90 Tablet 4    latanoprost (XALATAN) 0.005 % Solution Administer 1 Drop into the right eye every evening.       No current facility-administered medications for this visit.         Allergies as of 11/29/2023    (No Known Allergies)        ROS: Denies any chest pain, Shortness of breath, Changes bowel or bladder, Lower extremity edema.    Physical Exam:  BP (!) 140/68 (BP Location: Right arm, Patient Position: Sitting, BP Cuff Size: Adult)   Pulse (!) 59   Temp 36.4 °C (97.6 °F) (Temporal)   Resp 16   Ht 1.93 m (6' 4\")   Wt 108 kg (237 lb)   SpO2 96%   BMI 28.85 kg/m²   Gen.: Well-developed, well-nourished, no apparent distress,pleasant and cooperative with the examination  Skin:  Warm and dry with good turgor. No rashes or suspicious lesions in visible areas  HEENT:Sinuses nontender with palpation, TMs clear, nares patent with pink mucosa and clear rhinorrhea,no septal deviation ,polyps or lesions. lips without lesions, oropharynx clear.  Neck: Trachea midline,no masses or adenopathy. No JVD.  Cor: Regular rate and rhythm without murmur, gallop or rub.  Lungs: Respirations " unlabored.Clear to auscultation with equal breath sounds bilaterally. No wheezes, rhonchi.  Extremities: No cyanosis, clubbing or edema.          Assessment and Plan.   68 y.o. male     1. Hospital discharge follow-up      2. Resistant hypertension    - losartan (COZAAR) 50 MG Tab; Take 1.5 Tablets by mouth every day.  Dispense: 135 Tablet; Refill: 1    3. Ascending aorta dilatation (HCC)  Chest CT showed ascending aorta was 4.5 cm.  Currently asymptomatic.  Continue proper control of blood pressure.    Continue follow-up with cardiology.    4. Chronic insomnia  We will try trazodone 50 mg nightly.    - traZODone (DESYREL) 50 MG Tab; Take 1 Tablet by mouth every evening.  Dispense: 90 Tablet; Refill: 3    5. Abnormal cardiovascular stress test  Currently asymptomatic.    Cardiac stress test showed:   Small nonreversible defect in the apical segment suggestive of infarct. No evidence of reversible perfusion defect to suggest ischemia. Left ventricular ejection fraction measures 50%.   ECG INTERPRETATION: Nondiagnostic due to pharmacologic stress test.    Continue on atorvastatin 20 mg, aspirin 81 mg daily, metoprolol 25 mg twice a day.    Continue follow-up with cardiology, patient has an appointment next week.

## 2023-12-04 ENCOUNTER — APPOINTMENT (RX ONLY)
Dept: URBAN - METROPOLITAN AREA CLINIC 20 | Facility: CLINIC | Age: 68
Setting detail: DERMATOLOGY
End: 2023-12-04

## 2023-12-04 DIAGNOSIS — Z85.828 PERSONAL HISTORY OF OTHER MALIGNANT NEOPLASM OF SKIN: ICD-10-CM

## 2023-12-04 DIAGNOSIS — L81.4 OTHER MELANIN HYPERPIGMENTATION: ICD-10-CM

## 2023-12-04 DIAGNOSIS — D18.0 HEMANGIOMA: ICD-10-CM

## 2023-12-04 DIAGNOSIS — L57.0 ACTINIC KERATOSIS: ICD-10-CM

## 2023-12-04 DIAGNOSIS — Z71.89 OTHER SPECIFIED COUNSELING: ICD-10-CM

## 2023-12-04 DIAGNOSIS — D22 MELANOCYTIC NEVI: ICD-10-CM

## 2023-12-04 DIAGNOSIS — L21.8 OTHER SEBORRHEIC DERMATITIS: ICD-10-CM

## 2023-12-04 PROBLEM — D22.39 MELANOCYTIC NEVI OF OTHER PARTS OF FACE: Status: ACTIVE | Noted: 2023-12-04

## 2023-12-04 PROBLEM — D18.01 HEMANGIOMA OF SKIN AND SUBCUTANEOUS TISSUE: Status: ACTIVE | Noted: 2023-12-04

## 2023-12-04 PROBLEM — D22.72 MELANOCYTIC NEVI OF LEFT LOWER LIMB, INCLUDING HIP: Status: ACTIVE | Noted: 2023-12-04

## 2023-12-04 PROBLEM — D22.71 MELANOCYTIC NEVI OF RIGHT LOWER LIMB, INCLUDING HIP: Status: ACTIVE | Noted: 2023-12-04

## 2023-12-04 PROBLEM — D22.5 MELANOCYTIC NEVI OF TRUNK: Status: ACTIVE | Noted: 2023-12-04

## 2023-12-04 PROBLEM — D22.61 MELANOCYTIC NEVI OF RIGHT UPPER LIMB, INCLUDING SHOULDER: Status: ACTIVE | Noted: 2023-12-04

## 2023-12-04 PROBLEM — D23.71 OTHER BENIGN NEOPLASM OF SKIN OF RIGHT LOWER LIMB, INCLUDING HIP: Status: ACTIVE | Noted: 2023-12-04

## 2023-12-04 PROBLEM — D22.62 MELANOCYTIC NEVI OF LEFT UPPER LIMB, INCLUDING SHOULDER: Status: ACTIVE | Noted: 2023-12-04

## 2023-12-04 PROCEDURE — ? COUNSELING

## 2023-12-04 PROCEDURE — 99213 OFFICE O/P EST LOW 20 MIN: CPT | Mod: 25

## 2023-12-04 PROCEDURE — ? OBSERVATION

## 2023-12-04 PROCEDURE — ? LIQUID NITROGEN

## 2023-12-04 PROCEDURE — ? MEDICATION COUNSELING

## 2023-12-04 PROCEDURE — ? PRESCRIPTION

## 2023-12-04 PROCEDURE — 17000 DESTRUCT PREMALG LESION: CPT

## 2023-12-04 PROCEDURE — ? SUNSCREEN RECOMMENDATIONS

## 2023-12-04 RX ADMIN — KETOCONAZOLE: 20 CREAM TOPICAL at 00:00

## 2023-12-04 ASSESSMENT — LOCATION DETAILED DESCRIPTION DERM
LOCATION DETAILED: RIGHT DISTAL POSTERIOR THIGH
LOCATION DETAILED: RIGHT INFERIOR MEDIAL UPPER BACK
LOCATION DETAILED: SUPERIOR THORACIC SPINE
LOCATION DETAILED: LEFT LATERAL PROXIMAL PRETIBIAL REGION
LOCATION DETAILED: NASAL DORSUM
LOCATION DETAILED: RIGHT INFERIOR CENTRAL MALAR CHEEK
LOCATION DETAILED: RIGHT PROXIMAL PRETIBIAL REGION
LOCATION DETAILED: RIGHT INFERIOR FOREHEAD
LOCATION DETAILED: LEFT PROXIMAL POSTERIOR UPPER ARM
LOCATION DETAILED: LEFT VENTRAL PROXIMAL FOREARM
LOCATION DETAILED: RIGHT VENTRAL PROXIMAL FOREARM
LOCATION DETAILED: LEFT INFERIOR LATERAL FOREHEAD
LOCATION DETAILED: RIGHT SUPERIOR UPPER BACK
LOCATION DETAILED: RIGHT INFERIOR MEDIAL MIDBACK
LOCATION DETAILED: RIGHT TRAGUS
LOCATION DETAILED: RIGHT DISTAL POSTERIOR UPPER ARM
LOCATION DETAILED: LEFT DISTAL POSTERIOR THIGH

## 2023-12-04 ASSESSMENT — LOCATION SIMPLE DESCRIPTION DERM
LOCATION SIMPLE: RIGHT CHEEK
LOCATION SIMPLE: LEFT PRETIBIAL REGION
LOCATION SIMPLE: LEFT POSTERIOR THIGH
LOCATION SIMPLE: RIGHT UPPER BACK
LOCATION SIMPLE: RIGHT FOREHEAD
LOCATION SIMPLE: RIGHT POSTERIOR THIGH
LOCATION SIMPLE: NOSE
LOCATION SIMPLE: RIGHT PRETIBIAL REGION
LOCATION SIMPLE: RIGHT POSTERIOR UPPER ARM
LOCATION SIMPLE: LEFT POSTERIOR UPPER ARM
LOCATION SIMPLE: LEFT FOREARM
LOCATION SIMPLE: RIGHT FOREARM
LOCATION SIMPLE: RIGHT EAR
LOCATION SIMPLE: UPPER BACK
LOCATION SIMPLE: LEFT FOREHEAD
LOCATION SIMPLE: RIGHT LOWER BACK

## 2023-12-04 ASSESSMENT — LOCATION ZONE DERM
LOCATION ZONE: FACE
LOCATION ZONE: TRUNK
LOCATION ZONE: ARM
LOCATION ZONE: NOSE
LOCATION ZONE: LEG
LOCATION ZONE: EAR

## 2023-12-04 NOTE — PROCEDURE: LIQUID NITROGEN
Duration Of Freeze Thaw-Cycle (Seconds): 10
Post-Care Instructions: I reviewed with the patient in detail post-care instructions. Patient is to wear sunprotection, and avoid picking at any of the treated lesions. Pt may apply Vaseline to crusted or scabbing areas.
Number Of Freeze-Thaw Cycles: 2 freeze-thaw cycles
Show Applicator Variable?: Yes
Detail Level: Detailed
Render Note In Bullet Format When Appropriate: No
Consent: The patient's consent was obtained including but not limited to risks of crusting, scabbing, blistering, scarring, darker or lighter pigmentary change, recurrence, incomplete removal and infection. RTC in 2 months if lesion(s) persistent.

## 2023-12-04 NOTE — PROCEDURE: MEDICATION COUNSELING
Arava Counseling:  Patient counseled regarding adverse effects of Arava including but not limited to nausea, vomiting, abnormalities in liver function tests. Patients may develop mouth sores, rash, diarrhea, and abnormalities in blood counts. The patient understands that monitoring is required including LFTs and blood counts.  There is a rare possibility of scarring of the liver and lung problems that can occur when taking methotrexate. Persistent nausea, loss of appetite, pale stools, dark urine, cough, and shortness of breath should be reported immediately. Patient advised to discontinue Arava treatment and consult with a physician prior to attempting conception. The patient will have to undergo a treatment to eliminate Arava from the body prior to conception.
Libtayo Pregnancy And Lactation Text: This medication is contraindicated in pregnancy and when breast feeding.
Topical Metronidazole Pregnancy And Lactation Text: This medication is Pregnancy Category B and considered safe during pregnancy.  It is also considered safe to use while breastfeeding.
Bactrim Counseling:  I discussed with the patient the risks of sulfa antibiotics including but not limited to GI upset, allergic reaction, drug rash, diarrhea, dizziness, photosensitivity, and yeast infections.  Rarely, more serious reactions can occur including but not limited to aplastic anemia, agranulocytosis, methemoglobinemia, blood dyscrasias, liver or kidney failure, lung infiltrates or desquamative/blistering drug rashes.
Minoxidil Counseling: Minoxidil is a topical medication which can increase blood flow where it is applied. It is uncertain how this medication increases hair growth. Side effects are uncommon and include stinging and allergic reactions.
Protopic Pregnancy And Lactation Text: This medication is Pregnancy Category C. It is unknown if this medication is excreted in breast milk when applied topically.
VTAMA Counseling: I discussed with the patient that VTAMA is not for use in the eyes, mouth or mouth. They should call the office if they develop any signs of allergic reactions to VTAMA. The patient verbalized understanding of the proper use and possible adverse effects of VTAMA.  All of the patient's questions and concerns were addressed.
Niacinamide Counseling: I recommended taking niacin or niacinamide, also know as vitamin B3, twice daily. Recent evidence suggests that taking vitamin B3 (500 mg twice daily) can reduce the risk of actinic keratoses and non-melanoma skin cancers. Side effects of vitamin B3 include flushing and headache.
Siliq Counseling:  I discussed with the patient the risks of Siliq including but not limited to new or worsening depression, suicidal thoughts and behavior, immunosuppression, malignancy, posterior leukoencephalopathy syndrome, and serious infections.  The patient understands that monitoring is required including a PPD at baseline and must alert us or the primary physician if symptoms of infection or other concerning signs are noted. There is also a special program designed to monitor depression which is required with Siliq.
Otezla Pregnancy And Lactation Text: This medication is Pregnancy Category C and it isn't known if it is safe during pregnancy. It is unknown if it is excreted in breast milk.
Enbrel Pregnancy And Lactation Text: This medication is Pregnancy Category B and is considered safe during pregnancy. It is unknown if this medication is excreted in breast milk.
Sotyktu Counseling:  I discussed the most common side effects of Sotyktu including: common cold, sore throat, sinus infections, cold sores, canker sores, folliculitis, and acne.? I also discussed more serious side effects of Sotyktu including but not limited to: serious allergic reactions; increased risk for infections such as TB; cancers such as lymphomas; rhabdomyolysis and elevated CPK; and elevated triglycerides and liver enzymes.?
Gabapentin Pregnancy And Lactation Text: This medication is Pregnancy Category C and isn't considered safe during pregnancy. It is excreted in breast milk.
Sotyktu Pregnancy And Lactation Text: There is insufficient data to evaluate whether or not Sotyktu is safe to use during pregnancy.? ?It is not known if Sotyktu passes into breast milk and whether or not it is safe to use when breastfeeding.??
Oxybutynin Counseling:  I discussed with the patient the risks of oxybutynin including but not limited to skin rash, drowsiness, dry mouth, difficulty urinating, and blurred vision.
Elidel Counseling: Patient may experience a mild burning sensation during topical application. Elidel is not approved in children less than 2 years of age. There have been case reports of hematologic and skin malignancies in patients using topical calcineurin inhibitors although causality is questionable.
Erythromycin Pregnancy And Lactation Text: This medication is Pregnancy Category B and is considered safe during pregnancy. It is also excreted in breast milk.
Glycopyrrolate Counseling:  I discussed with the patient the risks of glycopyrrolate including but not limited to skin rash, drowsiness, dry mouth, difficulty urinating, and blurred vision.
Ketoconazole Counseling:   Patient counseled regarding improving absorption with orange juice.  Adverse effects include but are not limited to breast enlargement, headache, diarrhea, nausea, upset stomach, liver function test abnormalities, taste disturbance, and stomach pain.  There is a rare possibility of liver failure that can occur when taking ketoconazole. The patient understands that monitoring of LFTs may be required, especially at baseline. The patient verbalized understanding of the proper use and possible adverse effects of ketoconazole.  All of the patient's questions and concerns were addressed.
Cyclophosphamide Counseling:  I discussed with the patient the risks of cyclophosphamide including but not limited to hair loss, hormonal abnormalities, decreased fertility, abdominal pain, diarrhea, nausea and vomiting, bone marrow suppression and infection. The patient understands that monitoring is required while taking this medication.
Cibinqo Counseling: I discussed with the patient the risks of Cibinqo therapy including but not limited to common cold, nausea, headache, cold sores, increased blood CPK levels, dizziness, UTIs, fatigue, acne, and vomitting. Live vaccines should be avoided.  This medication has been linked to serious infections; higher rate of mortality; malignancy and lymphoproliferative disorders; major adverse cardiovascular events; thrombosis; thrombocytopenia and lymphopenia; lipid elevations; and retinal detachment.
Sarecycline Counseling: Patient advised regarding possible photosensitivity and discoloration of the teeth, skin, lips, tongue and gums.  Patient instructed to avoid sunlight, if possible.  When exposed to sunlight, patients should wear protective clothing, sunglasses, and sunscreen.  The patient was instructed to call the office immediately if the following severe adverse effects occur:  hearing changes, easy bruising/bleeding, severe headache, or vision changes.  The patient verbalized understanding of the proper use and possible adverse effects of sarecycline.  All of the patient's questions and concerns were addressed.
Topical Retinoid counseling:  Patient advised to apply a pea-sized amount only at bedtime and wait 30 minutes after washing their face before applying.  If too drying, patient may add a non-comedogenic moisturizer. The patient verbalized understanding of the proper use and possible adverse effects of retinoids.  All of the patient's questions and concerns were addressed.
Benzoyl Peroxide Pregnancy And Lactation Text: This medication is Pregnancy Category C. It is unknown if benzoyl peroxide is excreted in breast milk.
Isotretinoin Pregnancy And Lactation Text: This medication is Pregnancy Category X and is considered extremely dangerous during pregnancy. It is unknown if it is excreted in breast milk.
Finasteride Pregnancy And Lactation Text: This medication is absolutely contraindicated during pregnancy. It is unknown if it is excreted in breast milk.
Taltz Pregnancy And Lactation Text: The risk during pregnancy and breastfeeding is uncertain with this medication.
Birth Control Pills Counseling: Birth Control Pill Counseling: I discussed with the patient the potential side effects of OCPs including but not limited to increased risk of stroke, heart attack, thrombophlebitis, deep venous thrombosis, hepatic adenomas, breast changes, GI upset, headaches, and depression.  The patient verbalized understanding of the proper use and possible adverse effects of OCPs. All of the patient's questions and concerns were addressed.
Cibinqo Pregnancy And Lactation Text: It is unknown if this medication will adversely affect pregnancy or breast feeding.  You should not take this medication if you are currently pregnant or planning a pregnancy or while breastfeeding.
Topical Retinoid Pregnancy And Lactation Text: This medication is Pregnancy Category C. It is unknown if this medication is excreted in breast milk.
Bactrim Pregnancy And Lactation Text: This medication is Pregnancy Category D and is known to cause fetal risk.  It is also excreted in breast milk.
Tranexamic Acid Pregnancy And Lactation Text: It is unknown if this medication is safe during pregnancy or breast feeding.
Vtama Pregnancy And Lactation Text: It is unknown if this medication can cause problems during pregnancy and breastfeeding.
Minoxidil Pregnancy And Lactation Text: This medication has not been assigned a Pregnancy Risk Category but animal studies failed to show danger with the topical medication. It is unknown if the medication is excreted in breast milk.
Humira Counseling:  I discussed with the patient the risks of adalimumab including but not limited to myelosuppression, immunosuppression, autoimmune hepatitis, demyelinating diseases, lymphoma, and serious infections.  The patient understands that monitoring is required including a PPD at baseline and must alert us or the primary physician if symptoms of infection or other concerning signs are noted.
Topical Steroids Counseling: I discussed with the patient that prolonged use of topical steroids can result in the increased appearance of superficial blood vessels (telangiectasias), lightening (hypopigmentation) and thinning of the skin (atrophy).  Patient understands to avoid using high potency steroids in skin folds, the groin or the face.  The patient verbalized understanding of the proper use and possible adverse effects of topical steroids.  All of the patient's questions and concerns were addressed.
Niacinamide Pregnancy And Lactation Text: These medications are considered safe during pregnancy.
Adbry Pregnancy And Lactation Text: It is unknown if this medication will adversely affect pregnancy or breast feeding.
Qbrexza Counseling:  I discussed with the patient the risks of Qbrexza including but not limited to headache, mydriasis, blurred vision, dry eyes, nasal dryness, dry mouth, dry throat, dry skin, urinary hesitation, and constipation.  Local skin reactions including erythema, burning, stinging, and itching can also occur.
Arava Pregnancy And Lactation Text: This medication is Pregnancy Category X and is absolutely contraindicated during pregnancy. It is unknown if it is excreted in breast milk.
Odomzo Counseling- I discussed with the patient the risks of Odomzo including but not limited to nausea, vomiting, diarrhea, constipation, weight loss, changes in the sense of taste, decreased appetite, muscle spasms, and hair loss.  The patient verbalized understanding of the proper use and possible adverse effects of Odomzo.  All of the patient's questions and concerns were addressed.
Xeljanz Counseling: I discussed with the patient the risks of Xeljanz therapy including increased risk of infection, liver issues, headache, diarrhea, or cold symptoms. Live vaccines should be avoided. They were instructed to call if they have any problems.
Glycopyrrolate Pregnancy And Lactation Text: This medication is Pregnancy Category B and is considered safe during pregnancy. It is unknown if it is excreted breast milk.
Ketoconazole Pregnancy And Lactation Text: This medication is Pregnancy Category C and it isn't know if it is safe during pregnancy. It is also excreted in breast milk and breast feeding isn't recommended.
Valtrex Counseling: I discussed with the patient the risks of valacyclovir including but not limited to kidney damage, nausea, vomiting and severe allergy.  The patient understands that if the infection seems to be worsening or is not improving, they are to call.
Qbrexza Pregnancy And Lactation Text: There is no available data on Qbrexza use in pregnant women.  There is no available data on Qbrexza use in lactation.
Cimzia Counseling:  I discussed with the patient the risks of Cimzia including but not limited to immunosuppression, allergic reactions and infections.  The patient understands that monitoring is required including a PPD at baseline and must alert us or the primary physician if symptoms of infection or other concerning signs are noted.
Cyclophosphamide Pregnancy And Lactation Text: This medication is Pregnancy Category D and it isn't considered safe during pregnancy. This medication is excreted in breast milk.
Zoryve Counseling:  I discussed with the patient that Zoryve is not for use in the eyes, mouth or vagina. The most commonly reported side effects include diarrhea, headache, insomnia, application site pain, upper respiratory tract infections, and urinary tract infections.  All of the patient's questions and concerns were addressed.
Mirvaso Counseling: Mirvaso is a topical medication which can decrease superficial blood flow where applied. Side effects are uncommon and include stinging, redness and allergic reactions.
Sarecycline Pregnancy And Lactation Text: This medication is Pregnancy Category D and not consider safe during pregnancy. It is also excreted in breast milk.
Oxybutynin Pregnancy And Lactation Text: This medication is Pregnancy Category B and is considered safe during pregnancy. It is unknown if it is excreted in breast milk.
Hydroxyzine Counseling: Patient advised that the medication is sedating and not to drive a car after taking this medication.  Patient informed of potential adverse effects including but not limited to dry mouth, urinary retention, and blurry vision.  The patient verbalized understanding of the proper use and possible adverse effects of hydroxyzine.  All of the patient's questions and concerns were addressed.
Tremfya Counseling: I discussed with the patient the risks of guselkumab including but not limited to immunosuppression, serious infections, worsening of inflammatory bowel disease and drug reactions.  The patient understands that monitoring is required including a PPD at baseline and must alert us or the primary physician if symptoms of infection or other concerning signs are noted.
High Dose Vitamin A Counseling: Side effects reviewed, pt to contact office should one occur.
Metronidazole Counseling:  I discussed with the patient the risks of metronidazole including but not limited to seizures, nausea/vomiting, a metallic taste in the mouth, nausea/vomiting and severe allergy.
Carac Counseling:  I discussed with the patient the risks of Carac including but not limited to erythema, scaling, itching, weeping, crusting, and pain.
Carac Pregnancy And Lactation Text: This medication is Pregnancy Category X and contraindicated in pregnancy and in women who may become pregnant. It is unknown if this medication is excreted in breast milk.
Hydroxyzine Pregnancy And Lactation Text: This medication is not safe during pregnancy and should not be taken. It is also excreted in breast milk and breast feeding isn't recommended.
Birth Control Pills Pregnancy And Lactation Text: This medication should be avoided if pregnant and for the first 30 days post-partum.
Tetracycline Counseling: Patient counseled regarding possible photosensitivity and increased risk for sunburn.  Patient instructed to avoid sunlight, if possible.  When exposed to sunlight, patients should wear protective clothing, sunglasses, and sunscreen.  The patient was instructed to call the office immediately if the following severe adverse effects occur:  hearing changes, easy bruising/bleeding, severe headache, or vision changes.  The patient verbalized understanding of the proper use and possible adverse effects of tetracycline.  All of the patient's questions and concerns were addressed. Patient understands to avoid pregnancy while on therapy due to potential birth defects.
Metronidazole Pregnancy And Lactation Text: This medication is Pregnancy Category B and considered safe during pregnancy.  It is also excreted in breast milk.
Eucrisa Counseling: Patient may experience a mild burning sensation during topical application. Eucrisa is not approved in children less than 2 years of age.
Litfulo Counseling: I discussed with the patient the risks of Litfulo therapy including but not limited to upper respiratory tract infections, shingles, cold sores, and nausea. Live vaccines should be avoided.  This medication has been linked to serious infections; higher rate of mortality; malignancy and lymphoproliferative disorders; major adverse cardiovascular events; thrombosis; gastrointestinal perforations; neutropenia; lymphopenia; anemia; liver enzyme elevations; and lipid elevations.
High Dose Vitamin A Pregnancy And Lactation Text: High dose vitamin A therapy is contraindicated during pregnancy and breast feeding.
Topical Steroids Applications Pregnancy And Lactation Text: Most topical steroids are considered safe to use during pregnancy and lactation.  Any topical steroid applied to the breast or nipple should be washed off before breastfeeding.
Simponi Counseling:  I discussed with the patient the risks of golimumab including but not limited to myelosuppression, immunosuppression, autoimmune hepatitis, demyelinating diseases, lymphoma, and serious infections.  The patient understands that monitoring is required including a PPD at baseline and must alert us or the primary physician if symptoms of infection or other concerning signs are noted.
Cephalexin Counseling: I counseled the patient regarding use of cephalexin as an antibiotic for prophylactic and/or therapeutic purposes. Cephalexin (commonly prescribed under brand name Keflex) is a cephalosporin antibiotic which is active against numerous classes of bacteria, including most skin bacteria. Side effects may include nausea, diarrhea, gastrointestinal upset, rash, hives, yeast infections, and in rare cases, hepatitis, kidney disease, seizures, fever, confusion, neurologic symptoms, and others. Patients with severe allergies to penicillin medications are cautioned that there is about a 10% incidence of cross-reactivity with cephalosporins. When possible, patients with penicillin allergies should use alternatives to cephalosporins for antibiotic therapy.
Tazorac Counseling:  Patient advised that medication is irritating and drying.  Patient may need to apply sparingly and wash off after an hour before eventually leaving it on overnight.  The patient verbalized understanding of the proper use and possible adverse effects of tazorac.  All of the patient's questions and concerns were addressed.
Nsaids Counseling: NSAID Counseling: I discussed with the patient that NSAIDs should be taken with food. Prolonged use of NSAIDs can result in the development of stomach ulcers.  Patient advised to stop taking NSAIDs if abdominal pain occurs.  The patient verbalized understanding of the proper use and possible adverse effects of NSAIDs.  All of the patient's questions and concerns were addressed.
Clofazimine Counseling:  I discussed with the patient the risks of clofazimine including but not limited to skin and eye pigmentation, liver damage, nausea/vomiting, gastrointestinal bleeding and allergy.
Ilumya Counseling: I discussed with the patient the risks of tildrakizumab including but not limited to immunosuppression, malignancy, posterior leukoencephalopathy syndrome, and serious infections.  The patient understands that monitoring is required including a PPD at baseline and must alert us or the primary physician if symptoms of infection or other concerning signs are noted.
Nsaids Pregnancy And Lactation Text: These medications are considered safe up to 30 weeks gestation. It is excreted in breast milk.
Cimzia Pregnancy And Lactation Text: This medication crosses the placenta but can be considered safe in certain situations. Cimzia may be excreted in breast milk.
Mirvaso Pregnancy And Lactation Text: This medication has not been assigned a Pregnancy Risk Category. It is unknown if the medication is excreted in breast milk.
Detail Level: Zone
Rhofade Counseling: Rhofade is a topical medication which can decrease superficial blood flow where applied. Side effects are uncommon and include stinging, redness and allergic reactions.
Valtrex Pregnancy And Lactation Text: this medication is Pregnancy Category B and is considered safe during pregnancy. This medication is not directly found in breast milk but it's metabolite acyclovir is present.
Topical Sulfur Applications Counseling: Topical Sulfur Counseling: Patient counseled that this medication may cause skin irritation or allergic reactions.  In the event of skin irritation, the patient was advised to reduce the amount of the drug applied or use it less frequently.   The patient verbalized understanding of the proper use and possible adverse effects of topical sulfur application.  All of the patient's questions and concerns were addressed.
Hydroxychloroquine Counseling:  I discussed with the patient that a baseline ophthalmologic exam is needed at the start of therapy and every year thereafter while on therapy. A CBC may also be warranted for monitoring.  The side effects of this medication were discussed with the patient, including but not limited to agranulocytosis, aplastic anemia, seizures, rashes, retinopathy, and liver toxicity. Patient instructed to call the office should any adverse effect occur.  The patient verbalized understanding of the proper use and possible adverse effects of Plaquenil.  All the patient's questions and concerns were addressed.
Cyclosporine Counseling:  I discussed with the patient the risks of cyclosporine including but not limited to hypertension, gingival hyperplasia,myelosuppression, immunosuppression, liver damage, kidney damage, neurotoxicity, lymphoma, and serious infections. The patient understands that monitoring is required including baseline blood pressure, CBC, CMP, lipid panel and uric acid, and then 1-2 times monthly CMP and blood pressure.
Terbinafine Counseling: Patient counseling regarding adverse effects of terbinafine including but not limited to headache, diarrhea, rash, upset stomach, liver function test abnormalities, itching, taste/smell disturbance, nausea, abdominal pain, and flatulence.  There is a rare possibility of liver failure that can occur when taking terbinafine.  The patient understands that a baseline LFT and kidney function test may be required. The patient verbalized understanding of the proper use and possible adverse effects of terbinafine.  All of the patient's questions and concerns were addressed.
Xeltamikaz Pregnancy And Lactation Text: This medication is Pregnancy Category D and is not considered safe during pregnancy.  The risk during breast feeding is also uncertain.
Propranolol Counseling:  I discussed with the patient the risks of propranolol including but not limited to low heart rate, low blood pressure, low blood sugar, restlessness and increased cold sensitivity. They should call the office if they experience any of these side effects.
Xolair Counseling:  Patient informed of potential adverse effects including but not limited to fever, muscle aches, rash and allergic reactions.  The patient verbalized understanding of the proper use and possible adverse effects of Xolair.  All of the patient's questions and concerns were addressed.
Dutasteride Male Counseling: Dustasteride Counseling:  I discussed with the patient the risks of use of dutasteride including but not limited to decreased libido, decreased ejaculate volume, and gynecomastia. Women who can become pregnant should not handle medication.  All of the patient's questions and concerns were addressed.
Cephalexin Pregnancy And Lactation Text: This medication is Pregnancy Category B and considered safe during pregnancy.  It is also excreted in breast milk but can be used safely for shorter doses.
Minocycline Counseling: Patient advised regarding possible photosensitivity and discoloration of the teeth, skin, lips, tongue and gums.  Patient instructed to avoid sunlight, if possible.  When exposed to sunlight, patients should wear protective clothing, sunglasses, and sunscreen.  The patient was instructed to call the office immediately if the following severe adverse effects occur:  hearing changes, easy bruising/bleeding, severe headache, or vision changes.  The patient verbalized understanding of the proper use and possible adverse effects of minocycline.  All of the patient's questions and concerns were addressed.
Cyclosporine Pregnancy And Lactation Text: This medication is Pregnancy Category C and it isn't know if it is safe during pregnancy. This medication is excreted in breast milk.
Tazorac Pregnancy And Lactation Text: This medication is not safe during pregnancy. It is unknown if this medication is excreted in breast milk.
Calcipotriene Counseling:  I discussed with the patient the risks of calcipotriene including but not limited to erythema, scaling, itching, and irritation.
Spironolactone Counseling: Patient advised regarding risks of diarrhea, abdominal pain, hyperkalemia, birth defects (for female patients), liver toxicity and renal toxicity. The patient may need blood work to monitor liver and kidney function and potassium levels while on therapy. The patient verbalized understanding of the proper use and possible adverse effects of spironolactone.  All of the patient's questions and concerns were addressed.
Fluconazole Counseling:  Patient counseled regarding adverse effects of fluconazole including but not limited to headache, diarrhea, nausea, upset stomach, liver function test abnormalities, taste disturbance, and stomach pain.  There is a rare possibility of liver failure that can occur when taking fluconazole.  The patient understands that monitoring of LFTs and kidney function test may be required, especially at baseline. The patient verbalized understanding of the proper use and possible adverse effects of fluconazole.  All of the patient's questions and concerns were addressed.
Cosentyx Counseling:  I discussed with the patient the risks of Cosentyx including but not limited to worsening of Crohn's disease, immunosuppression, allergic reactions and infections.  The patient understands that monitoring is required including a PPD at baseline and must alert us or the primary physician if symptoms of infection or other concerning signs are noted.
Colchicine Counseling:  Patient counseled regarding adverse effects including but not limited to stomach upset (nausea, vomiting, stomach pain, or diarrhea).  Patient instructed to limit alcohol consumption while taking this medication.  Colchicine may reduce blood counts especially with prolonged use.  The patient understands that monitoring of kidney function and blood counts may be required, especially at baseline. The patient verbalized understanding of the proper use and possible adverse effects of colchicine.  All of the patient's questions and concerns were addressed.
Opzelura Counseling:  I discussed with the patient the risks of Opzelura including but not limited to nasopharngitis, bronchitis, ear infection, eosinophila, hives, diarrhea, folliculitis, tonsillitis, and rhinorrhea.  Taken orally, this medication has been linked to serious infections; higher rate of mortality; malignancy and lymphoproliferative disorders; major adverse cardiovascular events; thrombosis; thrombocytopenia, anemia, and neutropenia; and lipid elevations.
Aklief counseling:  Patient advised to apply a pea-sized amount only at bedtime and wait 30 minutes after washing their face before applying.  If too drying, patient may add a non-comedogenic moisturizer.  The most commonly reported side effects including irritation, redness, scaling, dryness, stinging, burning, itching, and increased risk of sunburn.  The patient verbalized understanding of the proper use and possible adverse effects of retinoids.  All of the patient's questions and concerns were addressed.
Include Pregnancy/Lactation Warning?: No
Terbinafine Pregnancy And Lactation Text: This medication is Pregnancy Category B and is considered safe during pregnancy. It is also excreted in breast milk and breast feeding isn't recommended.
Albendazole Counseling:  I discussed with the patient the risks of albendazole including but not limited to cytopenia, kidney damage, nausea/vomiting and severe allergy.  The patient understands that this medication is being used in an off-label manner.
Hydroxychloroquine Pregnancy And Lactation Text: This medication has been shown to cause fetal harm but it isn't assigned a Pregnancy Risk Category. There are small amounts excreted in breast milk.
Calcipotriene Pregnancy And Lactation Text: The use of this medication during pregnancy or lactation is not recommended as there is insufficient data.
Zyclara Counseling:  I discussed with the patient the risks of imiquimod including but not limited to erythema, scaling, itching, weeping, crusting, and pain.  Patient understands that the inflammatory response to imiquimod is variable from person to person and was educated regarded proper titration schedule.  If flu-like symptoms develop, patient knows to discontinue the medication and contact us.
Olanzapine Counseling- I discussed with the patient the common side effects of olanzapine including but are not limited to: lack of energy, dry mouth, increased appetite, sleepiness, tremor, constipation, dizziness, changes in behavior, or restlessness.  Explained that teenagers are more likely to experience headaches, abdominal pain, pain in the arms or legs, tiredness, and sleepiness.  Serious side effects include but are not limited: increased risk of death in elderly patients who are confused, have memory loss, or dementia-related psychosis; hyperglycemia; increased cholesterol and triglycerides; and weight gain.
Topical Sulfur Applications Pregnancy And Lactation Text: This medication is Pregnancy Category C and has an unknown safety profile during pregnancy. It is unknown if this topical medication is excreted in breast milk.
Litfulo Pregnancy And Lactation Text: Based on animal studies, Lifulo may cause embryo-fetal harm when administered to pregnant women.  The medication should not be used in pregnancy.  Breastfeeding is not recommended during treatment.
Propranolol Pregnancy And Lactation Text: This medication is Pregnancy Category C and it isn't known if it is safe during pregnancy. It is excreted in breast milk.
SSKI Counseling:  I discussed with the patient the risks of SSKI including but not limited to thyroid abnormalities, metallic taste, GI upset, fever, headache, acne, arthralgias, paraesthesias, lymphadenopathy, easy bleeding, arrhythmias, and allergic reaction.
Skyrizi Counseling: I discussed with the patient the risks of risankizumab-rzaa including but not limited to immunosuppression, and serious infections.  The patient understands that monitoring is required including a PPD at baseline and must alert us or the primary physician if symptoms of infection or other concerning signs are noted.
Low Dose Naltrexone Counseling- I discussed with the patient the potential risks and side effects of low dose naltrexone including but not limited to: more vivid dreams, headaches, nausea, vomiting, abdominal pain, fatigue, dizziness, and anxiety.
Hydroquinone Counseling:  Patient advised that medication may result in skin irritation, lightening (hypopigmentation), dryness, and burning.  In the event of skin irritation, the patient was advised to reduce the amount of the drug applied or use it less frequently.  Rarely, spots that are treated with hydroquinone can become darker (pseudoochronosis).  Should this occur, patient instructed to stop medication and call the office. The patient verbalized understanding of the proper use and possible adverse effects of hydroquinone.  All of the patient's questions and concerns were addressed.
Cantharidin Counseling:  I discussed with the patient the risks of Cantharidin including but not limited to pain, redness, burning, itching, and blistering.
Clindamycin Counseling: I counseled the patient regarding use of clindamycin as an antibiotic for prophylactic and/or therapeutic purposes. Clindamycin is active against numerous classes of bacteria, including skin bacteria. Side effects may include nausea, diarrhea, gastrointestinal upset, rash, hives, yeast infections, and in rare cases, colitis.
Fluconazole Pregnancy And Lactation Text: This medication is Pregnancy Category C and it isn't know if it is safe during pregnancy. It is also excreted in breast milk.
Acitretin Counseling:  I discussed with the patient the risks of acitretin including but not limited to hair loss, dry lips/skin/eyes, liver damage, hyperlipidemia, depression/suicidal ideation, photosensitivity.  Serious rare side effects can include but are not limited to pancreatitis, pseudotumor cerebri, bony changes, clot formation/stroke/heart attack.  Patient understands that alcohol is contraindicated since it can result in liver toxicity and significantly prolong the elimination of the drug by many years.
Topical Clindamycin Counseling: Patient counseled that this medication may cause skin irritation or allergic reactions.  In the event of skin irritation, the patient was advised to reduce the amount of the drug applied or use it less frequently.   The patient verbalized understanding of the proper use and possible adverse effects of clindamycin.  All of the patient's questions and concerns were addressed.
Xolair Pregnancy And Lactation Text: This medication is Pregnancy Category B and is considered safe during pregnancy. This medication is excreted in breast milk.
Spironolactone Pregnancy And Lactation Text: This medication can cause feminization of the male fetus and should be avoided during pregnancy. The active metabolite is also found in breast milk.
Cimetidine Counseling:  I discussed with the patient the risks of Cimetidine including but not limited to gynecomastia, headache, diarrhea, nausea, drowsiness, arrhythmias, pancreatitis, skin rashes, psychosis, bone marrow suppression and kidney toxicity.
Dutasteride Pregnancy And Lactation Text: This medication is absolutely contraindicated in women, especially during pregnancy and breast feeding. Feminization of male fetuses is possible if taking while pregnant.
Cantharidin Pregnancy And Lactation Text: This medication has not been proven safe during pregnancy. It is unknown if this medication is excreted in breast milk.
Aklief Pregnancy And Lactation Text: It is unknown if this medication is safe to use during pregnancy.  It is unknown if this medication is excreted in breast milk.  Breastfeeding women should use the topical cream on the smallest area of the skin for the shortest time needed while breastfeeding.  Do not apply to nipple and areola.
Opzelura Pregnancy And Lactation Text: There is insufficient data to evaluate drug-associated risk for major birth defects, miscarriage, or other adverse maternal or fetal outcomes.  There is a pregnancy registry that monitors pregnancy outcomes in pregnant persons exposed to the medication during pregnancy.  It is unknown if this medication is excreted in breast milk.  Do not breastfeed during treatment and for about 4 weeks after the last dose.
Wartpeel Counseling:  I discussed with the patient the risks of Wartpeel including but not limited to erythema, scaling, itching, weeping, crusting, and pain.
Methotrexate Counseling:  Patient counseled regarding adverse effects of methotrexate including but not limited to nausea, vomiting, abnormalities in liver function tests. Patients may develop mouth sores, rash, diarrhea, and abnormalities in blood counts. The patient understands that monitoring is required including LFT's and blood counts.  There is a rare possibility of scarring of the liver and lung problems that can occur when taking methotrexate. Persistent nausea, loss of appetite, pale stools, dark urine, cough, and shortness of breath should be reported immediately. Patient advised to discontinue methotrexate treatment at least three months before attempting to become pregnant.  I discussed the need for folate supplements while taking methotrexate.  These supplements can decrease side effects during methotrexate treatment. The patient verbalized understanding of the proper use and possible adverse effects of methotrexate.  All of the patient's questions and concerns were addressed.
Olumiant Counseling: I discussed with the patient the risks of Olumiant therapy including but not limited to upper respiratory tract infections, shingles, cold sores, and nausea. Live vaccines should be avoided.  This medication has been linked to serious infections; higher rate of mortality; malignancy and lymphoproliferative disorders; major adverse cardiovascular events; thrombosis; gastrointestinal perforations; neutropenia; lymphopenia; anemia; liver enzyme elevations; and lipid elevations.
Infliximab Counseling:  I discussed with the patient the risks of infliximab including but not limited to myelosuppression, immunosuppression, autoimmune hepatitis, demyelinating diseases, lymphoma, and serious infections.  The patient understands that monitoring is required including a PPD at baseline and must alert us or the primary physician if symptoms of infection or other concerning signs are noted.
Olanzapine Pregnancy And Lactation Text: This medication is pregnancy category C.   There are no adequate and well controlled trials with olanzapine in pregnant females.  Olanzapine should be used during pregnancy only if the potential benefit justifies the potential risk to the fetus.   In a study in lactating healthy women, olanzapine was excreted in breast milk.  It is recommended that women taking olanzapine should not breast feed.
Solaraze Counseling:  I discussed with the patient the risks of Solaraze including but not limited to erythema, scaling, itching, weeping, crusting, and pain.
Albendazole Pregnancy And Lactation Text: This medication is Pregnancy Category C and it isn't known if it is safe during pregnancy. It is also excreted in breast milk.
Oral Minoxidil Counseling- I discussed with the patient the risks of oral minoxidil including but not limited to shortness of breath, swelling of the feet or ankles, dizziness, lightheadedness, unwanted hair growth and allergic reaction.  The patient verbalized understanding of the proper use and possible adverse effects of oral minoxidil.  All of the patient's questions and concerns were addressed.
Olumiant Pregnancy And Lactation Text: Based on animal studies, Olumiant may cause embryo-fetal harm when administered to pregnant women.  The medication should not be used in pregnancy.  Breastfeeding is not recommended during treatment.
Azelaic Acid Counseling: Patient counseled that medicine may cause skin irritation and to avoid applying near the eyes.  In the event of skin irritation, the patient was advised to reduce the amount of the drug applied or use it less frequently.   The patient verbalized understanding of the proper use and possible adverse effects of azelaic acid.  All of the patient's questions and concerns were addressed.
Clindamycin Pregnancy And Lactation Text: This medication can be used in pregnancy if certain situations. Clindamycin is also present in breast milk.
Ivermectin Counseling:  Patient instructed to take medication on an empty stomach with a full glass of water.  Patient informed of potential adverse effects including but not limited to nausea, diarrhea, dizziness, itching, and swelling of the extremities or lymph nodes.  The patient verbalized understanding of the proper use and possible adverse effects of ivermectin.  All of the patient's questions and concerns were addressed.
Dapsone Counseling: I discussed with the patient the risks of dapsone including but not limited to hemolytic anemia, agranulocytosis, rashes, methemoglobinemia, kidney failure, peripheral neuropathy, headaches, GI upset, and liver toxicity.  Patients who start dapsone require monitoring including baseline LFTs and weekly CBCs for the first month, then every month thereafter.  The patient verbalized understanding of the proper use and possible adverse effects of dapsone.  All of the patient's questions and concerns were addressed.
Azathioprine Counseling:  I discussed with the patient the risks of azathioprine including but not limited to myelosuppression, immunosuppression, hepatotoxicity, lymphoma, and infections.  The patient understands that monitoring is required including baseline LFTs, Creatinine, possible TPMP genotyping and weekly CBCs for the first month and then every 2 weeks thereafter.  The patient verbalized understanding of the proper use and possible adverse effects of azathioprine.  All of the patient's questions and concerns were addressed.
Solaraze Pregnancy And Lactation Text: This medication is Pregnancy Category B and is considered safe. There is some data to suggest avoiding during the third trimester. It is unknown if this medication is excreted in breast milk.
Low Dose Naltrexone Pregnancy And Lactation Text: Naltrexone is pregnancy category C.  There have been no adequate and well-controlled studies in pregnant women.  It should be used in pregnancy only if the potential benefit justifies the potential risk to the fetus.   Limited data indicates that naltrexone is minimally excreted into breastmilk.
Picato Counseling:  I discussed with the patient the risks of Picato including but not limited to erythema, scaling, itching, weeping, crusting, and pain.
Methotrexate Pregnancy And Lactation Text: This medication is Pregnancy Category X and is known to cause fetal harm. This medication is excreted in breast milk.
Quinolones Counseling:  I discussed with the patient the risks of fluoroquinolones including but not limited to GI upset, allergic reaction, drug rash, diarrhea, dizziness, photosensitivity, yeast infections, liver function test abnormalities, tendonitis/tendon rupture.
Sski Pregnancy And Lactation Text: This medication is Pregnancy Category D and isn't considered safe during pregnancy. It is excreted in breast milk.
Acitretin Pregnancy And Lactation Text: This medication is Pregnancy Category X and should not be given to women who are pregnant or may become pregnant in the future. This medication is excreted in breast milk.
Griseofulvin Counseling:  I discussed with the patient the risks of griseofulvin including but not limited to photosensitivity, cytopenia, liver damage, nausea/vomiting and severe allergy.  The patient understands that this medication is best absorbed when taken with a fatty meal (e.g., ice cream or french fries).
5-Fu Counseling: 5-Fluorouracil Counseling:  I discussed with the patient the risks of 5-fluorouracil including but not limited to erythema, scaling, itching, weeping, crusting, and pain.
Opioid Counseling: I discussed with the patient the potential side effects of opioids including but not limited to addiction, altered mental status, and depression. I stressed avoiding alcohol, benzodiazepines, muscle relaxants and sleep aids unless specifically okayed by a physician. The patient verbalized understanding of the proper use and possible adverse effects of opioids. All of the patient's questions and concerns were addressed. They were instructed to flush the remaining pills down the toilet if they did not need them for pain.
Stelara Counseling:  I discussed with the patient the risks of ustekinumab including but not limited to immunosuppression, malignancy, posterior leukoencephalopathy syndrome, and serious infections.  The patient understands that monitoring is required including a PPD at baseline and must alert us or the primary physician if symptoms of infection or other concerning signs are noted.
Bexarotene Counseling:  I discussed with the patient the risks of bexarotene including but not limited to hair loss, dry lips/skin/eyes, liver abnormalities, hyperlipidemia, pancreatitis, depression/suicidal ideation, photosensitivity, drug rash/allergic reactions, hypothyroidism, anemia, leukopenia, infection, cataracts, and teratogenicity.  Patient understands that they will need regular blood tests to check lipid profile, liver function tests, white blood cell count, thyroid function tests and pregnancy test if applicable.
Erivedge Counseling- I discussed with the patient the risks of Erivedge including but not limited to nausea, vomiting, diarrhea, constipation, weight loss, changes in the sense of taste, decreased appetite, muscle spasms, and hair loss.  The patient verbalized understanding of the proper use and possible adverse effects of Erivedge.  All of the patient's questions and concerns were addressed.
Dupixent Counseling: I discussed with the patient the risks of dupilumab including but not limited to eye infection and irritation, cold sores, injection site reactions, worsening of asthma, allergic reactions and increased risk of parasitic infection.  Live vaccines should be avoided while taking dupilumab. Dupilumab will also interact with certain medications such as warfarin and cyclosporine. The patient understands that monitoring is required and they must alert us or the primary physician if symptoms of infection or other concerning signs are noted.
Topical Ketoconazole Counseling: Patient counseled that this medication may cause skin irritation or allergic reactions.  In the event of skin irritation, the patient was advised to reduce the amount of the drug applied or use it less frequently.   The patient verbalized understanding of the proper use and possible adverse effects of ketoconazole.  All of the patient's questions and concerns were addressed.
Oral Minoxidil Pregnancy And Lactation Text: This medication should only be used when clearly needed if you are pregnant, attempting to become pregnant or breast feeding.
Griseofulvin Pregnancy And Lactation Text: This medication is Pregnancy Category X and is known to cause serious birth defects. It is unknown if this medication is excreted in breast milk but breast feeding should be avoided.
Azithromycin Counseling:  I discussed with the patient the risks of azithromycin including but not limited to GI upset, allergic reaction, drug rash, diarrhea, and yeast infections.
Azathioprine Pregnancy And Lactation Text: This medication is Pregnancy Category D and isn't considered safe during pregnancy. It is unknown if this medication is excreted in breast milk.
Prednisone Counseling:  I discussed with the patient the risks of prolonged use of prednisone including but not limited to weight gain, insomnia, osteoporosis, mood changes, diabetes, susceptibility to infection, glaucoma and high blood pressure.  In cases where prednisone use is prolonged, patients should be monitored with blood pressure checks, serum glucose levels and an eye exam.  Additionally, the patient may need to be placed on GI prophylaxis, PCP prophylaxis, and calcium and vitamin D supplementation and/or a bisphosphonate.  The patient verbalized understanding of the proper use and the possible adverse effects of prednisone.  All of the patient's questions and concerns were addressed.
Winlevi Counseling:  I discussed with the patient the risks of topical clascoterone including but not limited to erythema, scaling, itching, and stinging. Patient voiced their understanding.
Klisyri Counseling:  I discussed with the patient the risks of Klisyri including but not limited to erythema, scaling, itching, weeping, crusting, and pain.
Imiquimod Counseling:  I discussed with the patient the risks of imiquimod including but not limited to erythema, scaling, itching, weeping, crusting, and pain.  Patient understands that the inflammatory response to imiquimod is variable from person to person and was educated regarded proper titration schedule.  If flu-like symptoms develop, patient knows to discontinue the medication and contact us.
Rinvoq Counseling: I discussed with the patient the risks of Rinvoq therapy including but not limited to upper respiratory tract infections, shingles, cold sores, bronchitis, nausea, cough, fever, acne, and headache. Live vaccines should be avoided.  This medication has been linked to serious infections; higher rate of mortality; malignancy and lymphoproliferative disorders; major adverse cardiovascular events; thrombosis; thrombocytopenia, anemia, and neutropenia; lipid elevations; liver enzyme elevations; and gastrointestinal perforations.
Thalidomide Counseling: I discussed with the patient the risks of thalidomide including but not limited to birth defects, anxiety, weakness, chest pain, dizziness, cough and severe allergy.
Doxycycline Counseling:  Patient counseled regarding possible photosensitivity and increased risk for sunburn.  Patient instructed to avoid sunlight, if possible.  When exposed to sunlight, patients should wear protective clothing, sunglasses, and sunscreen.  The patient was instructed to call the office immediately if the following severe adverse effects occur:  hearing changes, easy bruising/bleeding, severe headache, or vision changes.  The patient verbalized understanding of the proper use and possible adverse effects of doxycycline.  All of the patient's questions and concerns were addressed.
Azelaic Acid Pregnancy And Lactation Text: This medication is considered safe during pregnancy and breast feeding.
Rifampin Counseling: I discussed with the patient the risks of rifampin including but not limited to liver damage, kidney damage, red-orange body fluids, nausea/vomiting and severe allergy.
Doxycycline Pregnancy And Lactation Text: This medication is Pregnancy Category D and not consider safe during pregnancy. It is also excreted in breast milk but is considered safe for shorter treatment courses.
Drysol Counseling:  I discussed with the patient the risks of drysol/aluminum chloride including but not limited to skin rash, itching, irritation, burning.
Bexarotene Pregnancy And Lactation Text: This medication is Pregnancy Category X and should not be given to women who are pregnant or may become pregnant. This medication should not be used if you are breast feeding.
Rituxan Counseling:  I discussed with the patient the risks of Rituxan infusions. Side effects can include infusion reactions, severe drug rashes including mucocutaneous reactions, reactivation of latent hepatitis and other infections and rarely progressive multifocal leukoencephalopathy.  All of the patient's questions and concerns were addressed.
Dupixent Pregnancy And Lactation Text: This medication likely crosses the placenta but the risk for the fetus is uncertain. This medication is excreted in breast milk.
Dapsone Pregnancy And Lactation Text: This medication is Pregnancy Category C and is not considered safe during pregnancy or breast feeding.
Doxepin Counseling:  Patient advised that the medication is sedating and not to drive a car after taking this medication. Patient informed of potential adverse effects including but not limited to dry mouth, urinary retention, and blurry vision.  The patient verbalized understanding of the proper use and possible adverse effects of doxepin.  All of the patient's questions and concerns were addressed.
Soolantra Counseling: I discussed with the patients the risks of topial Soolantra. This is a medicine which decreases the number of mites and inflammation in the skin. You experience burning, stinging, eye irritation or allergic reactions.  Please call our office if you develop any problems from using this medication.
Opioid Pregnancy And Lactation Text: These medications can lead to premature delivery and should be avoided during pregnancy. These medications are also present in breast milk in small amounts.
Enbrel Counseling:  I discussed with the patient the risks of etanercept including but not limited to myelosuppression, immunosuppression, autoimmune hepatitis, demyelinating diseases, lymphoma, and infections.  The patient understands that monitoring is required including a PPD at baseline and must alert us or the primary physician if symptoms of infection or other concerning signs are noted.
Gabapentin Counseling: I discussed with the patient the risks of gabapentin including but not limited to dizziness, somnolence, fatigue and ataxia.
Protopic Counseling: Patient may experience a mild burning sensation during topical application. Protopic is not approved in children less than 2 years of age. There have been case reports of hematologic and skin malignancies in patients using topical calcineurin inhibitors although causality is questionable.
Libtayo Counseling- I discussed with the patient the risks of Libtayo including but not limited to nausea, vomiting, diarrhea, and bone or muscle pain.  The patient verbalized understanding of the proper use and possible adverse effects of Libtayo.  All of the patient's questions and concerns were addressed.
Topical Metronidazole Counseling: Metronidazole is a topical antibiotic medication. You may experience burning, stinging, redness, or allergic reactions.  Please call our office if you develop any problems from using this medication.
Rituxan Pregnancy And Lactation Text: This medication is Pregnancy Category C and it isn't know if it is safe during pregnancy. It is unknown if this medication is excreted in breast milk but similar antibodies are known to be excreted.
Itraconazole Counseling:  I discussed with the patient the risks of itraconazole including but not limited to liver damage, nausea/vomiting, neuropathy, and severe allergy.  The patient understands that this medication is best absorbed when taken with acidic beverages such as non-diet cola or ginger ale.  The patient understands that monitoring is required including baseline LFTs and repeat LFTs at intervals.  The patient understands that they are to contact us or the primary physician if concerning signs are noted.
Cellcept Counseling:  I discussed with the patient the risks of mycophenolate mofetil including but not limited to infection/immunosuppression, GI upset, hypokalemia, hypercholesterolemia, bone marrow suppression, lymphoproliferative disorders, malignancy, GI ulceration/bleed/perforation, colitis, interstitial lung disease, kidney failure, progressive multifocal leukoencephalopathy, and birth defects.  The patient understands that monitoring is required including a baseline creatinine and regular CBC testing. In addition, patient must alert us immediately if symptoms of infection or other concerning signs are noted.
Rinvoq Pregnancy And Lactation Text: Based on animal studies, Rinvoq may cause embryo-fetal harm when administered to pregnant women.  The medication should not be used in pregnancy.  Breastfeeding is not recommended during treatment and for 6 days after the last dose.
Otezla Counseling: The side effects of Otezla were discussed with the patient, including but not limited to worsening or new depression, weight loss, diarrhea, nausea, upper respiratory tract infection, and headache. Patient instructed to call the office should any adverse effect occur.  The patient verbalized understanding of the proper use and possible adverse effects of Otezla.  All the patient's questions and concerns were addressed.
Winlevi Pregnancy And Lactation Text: This medication is considered safe during pregnancy and breastfeeding.
Klisyri Pregnancy And Lactation Text: It is unknown if this medication can harm a developing fetus or if it is excreted in breast milk.
Taltz Counseling: I discussed with the patient the risks of ixekizumab including but not limited to immunosuppression, serious infections, worsening of inflammatory bowel disease and drug reactions.  The patient understands that monitoring is required including a PPD at baseline and must alert us or the primary physician if symptoms of infection or other concerning signs are noted.
Tranexamic Acid Counseling:  Patient advised of the small risk of bleeding problems with tranexamic acid. They were also instructed to call if they developed any nausea, vomiting or diarrhea. All of the patient's questions and concerns were addressed.
Rifampin Pregnancy And Lactation Text: This medication is Pregnancy Category C and it isn't know if it is safe during pregnancy. It is also excreted in breast milk and should not be used if you are breast feeding.
Erythromycin Counseling:  I discussed with the patient the risks of erythromycin including but not limited to GI upset, allergic reaction, drug rash, diarrhea, increase in liver enzymes, and yeast infections.
Benzoyl Peroxide Counseling: Patient counseled that medicine may cause skin irritation and bleach clothing.  In the event of skin irritation, the patient was advised to reduce the amount of the drug applied or use it less frequently.   The patient verbalized understanding of the proper use and possible adverse effects of benzoyl peroxide.  All of the patient's questions and concerns were addressed.
Soolantra Pregnancy And Lactation Text: This medication is Pregnancy Category C. This medication is considered safe during breast feeding.
Isotretinoin Counseling: Patient should get monthly blood tests, not donate blood, not drive at night if vision affected, not share medication, and not undergo elective surgery for 6 months after tx completed. Side effects reviewed, pt to contact office should one occur.
Finasteride Male Counseling: Finasteride Counseling:  I discussed with the patient the risks of use of finasteride including but not limited to decreased libido, decreased ejaculate volume, gynecomastia, and depression. Women should not handle medication.  All of the patient's questions and concerns were addressed.
Doxepin Pregnancy And Lactation Text: This medication is Pregnancy Category C and it isn't known if it is safe during pregnancy. It is also excreted in breast milk and breast feeding isn't recommended.
Adbry Counseling: I discussed with the patient the risks of tralokinumab including but not limited to eye infection and irritation, cold sores, injection site reactions, worsening of asthma, allergic reactions and increased risk of parasitic infection.  Live vaccines should be avoided while taking tralokinumab. The patient understands that monitoring is required and they must alert us or the primary physician if symptoms of infection or other concerning signs are noted.
Azithromycin Pregnancy And Lactation Text: This medication is considered safe during pregnancy and is also secreted in breast milk.

## 2023-12-05 ENCOUNTER — OFFICE VISIT (OUTPATIENT)
Dept: CARDIOLOGY | Facility: MEDICAL CENTER | Age: 68
End: 2023-12-05
Attending: PHYSICIAN ASSISTANT
Payer: MEDICARE

## 2023-12-05 VITALS
HEART RATE: 59 BPM | RESPIRATION RATE: 14 BRPM | BODY MASS INDEX: 28.01 KG/M2 | SYSTOLIC BLOOD PRESSURE: 124 MMHG | OXYGEN SATURATION: 96 % | HEIGHT: 76 IN | WEIGHT: 230 LBS | DIASTOLIC BLOOD PRESSURE: 58 MMHG

## 2023-12-05 DIAGNOSIS — G47.00 INSOMNIA, UNSPECIFIED TYPE: ICD-10-CM

## 2023-12-05 DIAGNOSIS — I77.810 ASCENDING AORTA DILATATION (HCC): ICD-10-CM

## 2023-12-05 DIAGNOSIS — E78.5 DYSLIPIDEMIA: ICD-10-CM

## 2023-12-05 DIAGNOSIS — H81.09 MENIERE'S DISEASE, UNSPECIFIED LATERALITY: ICD-10-CM

## 2023-12-05 DIAGNOSIS — I10 PRIMARY HYPERTENSION: ICD-10-CM

## 2023-12-05 PROCEDURE — 99214 OFFICE O/P EST MOD 30 MIN: CPT | Performed by: PHYSICIAN ASSISTANT

## 2023-12-05 PROCEDURE — 3078F DIAST BP <80 MM HG: CPT | Performed by: PHYSICIAN ASSISTANT

## 2023-12-05 PROCEDURE — 3074F SYST BP LT 130 MM HG: CPT | Performed by: PHYSICIAN ASSISTANT

## 2023-12-05 RX ORDER — KETOCONAZOLE 20 MG/G
CREAM TOPICAL BID
Qty: 30 | Refills: 0 | Status: ERX | COMMUNITY
Start: 2023-12-04

## 2023-12-05 ASSESSMENT — MINNESOTA LIVING WITH HEART FAILURE QUESTIONNAIRE (MLHF)
MAKING YOU SHORT OF BREATH: 0
DIFFICULTY SOCIALIZING WITH FAMILY OR FRIENDS: 0
DIFFICULTY SLEEPING WELL AT NIGHT: 1
EATING LESS FOODS YOU LIKE: 0
DIFFICULTY WITH SEXUAL ACTIVITIES: 0
LOSS OF SELF CONTROL IN YOUR LIFE: 0
DIFFICULTY WORKING TO EARN A LIVING: 0
MAKING YOU WORRY: 3
DIFFICULTY GOING AWAY FROM HOME: 0
TOTAL_SCORE: 10
TIRED, FATIGUED OR LOW ON ENERGY: 1
MAKING YOU STAY IN A HOSPITAL: 2
DIFFICULTY WITH RECREATIONAL PASTIMES, SPORTS, HOBBIES: 0
MAKING YOU FEEL DEPRESSED: 0
GIVING YOU SIDE EFFECTS FROM TREATMENTS: 2
SWELLING IN ANKLES OR LEGS: 0
DIFFICULTY TO CONCENTRATE OR REMEMBERING THINGS: 0
COSTING YOU MONEY FOR MEDICAL CARE: 0
FEELING LIKE A BURDEN TO FAMILY AND FRIENDS: 0
WORKING AROUND THE HOUSE OR YARD DIFFICULT: 0
HAVING TO SIT OR LIE DOWN DURING THE DAY: 1
WALKING ABOUT OR CLIMBING STAIRS DIFFICULT: 0

## 2023-12-05 ASSESSMENT — FIBROSIS 4 INDEX: FIB4 SCORE: 2.11

## 2023-12-05 ASSESSMENT — ENCOUNTER SYMPTOMS: NERVOUS/ANXIOUS: 1

## 2023-12-05 NOTE — PROGRESS NOTES
Chief Complaint   Patient presents with    HTN (Uncontrolled)       Subjective     Mike Live is a 68 y.o. male with a history of aortic dilation, hyperlipidemia and hypertension who presents today for hospital follow-up.    His primary cardiologist is Dr. Rosa.  Last seen 10/30/2023.  At that exam, he was seen for evaluation of abnormal echocardiogram which demonstrated an LVEF of 50%, but might have been underestimated due to image quality.  He did not have any cardiac concerns.  His blood pressure was not well-controlled and he was recommended to change metoprolol XL to bisoprolol 10 mg daily.  He was also referred to sleep medicine and recommended for repeat echocardiogram.11/18/2023 he presented to the emergency department with chest tightness and a SBP in the 190s.  An EKG demonstrated sinus rhythm.  He underwent NM stress testing which reveal a nonreversible defect in the apical segment suggestive of infarct.  He was started on losartan and recommended to follow-up with his primary care physician.    Today, he reports he is doing well. He did have a syncopal episode 12/4/2023 after taking trazodone. He has not taken this medication again and has not had any further episodes. He does have chronic dizziness/lightheadedness. No chest pain or palpitations.  No shortness of breath, dyspnea on exertion, orthopnea or PND. No lower extremity edema.. No syncope or presyncope.      11/29/2023 he saw his PCP.  His blood pressure was 140/68 at that exam.  His losartan was increased to 75 mg daily.    He stopped drinking caffeine.      Past Medical History:   Diagnosis Date    Hypertension      Past Surgical History:   Procedure Laterality Date    UT TOTAL HIP ARTHROPLASTY Left 11/2/2021    Procedure: ARTHROPLASTY, HIP, TOTAL, ANTERIOR APPROACH;  Surgeon: Costa Pemberton M.D.;  Location: [facility];  Service: Orthopedics    CATARACT EXTRACTION WITH IOL Bilateral     COLONOSCOPY  age 50     neg    EYE SURGERY  age 4    R eye,trauma     Family History   Problem Relation Age of Onset    Cancer Father         melanoma    GI Disease Mother     Cancer Sister         breast    Cancer Maternal Grandmother         kidney    Cancer Maternal Grandfather         prostate    Cancer Paternal Grandmother         liver    Cancer Paternal Grandfather         throat    Diabetes Neg Hx     Heart Disease Neg Hx     Stroke Neg Hx      Social History     Socioeconomic History    Marital status:      Spouse name: Not on file    Number of children: Not on file    Years of education: Not on file    Highest education level: Bachelor's degree (e.g., BA, AB, BS)   Occupational History    Not on file   Tobacco Use    Smoking status: Never    Smokeless tobacco: Never   Vaping Use    Vaping Use: Never used   Substance and Sexual Activity    Alcohol use: Not Currently     Comment: rare    Drug use: No    Sexual activity: Yes     Partners: Female     Comment: ,  [facility]   Other Topics Concern     Service No    Blood Transfusions No    Caffeine Concern No    Occupational Exposure No    Hobby Hazards No    Sleep Concern Yes    Stress Concern No    Weight Concern No    Special Diet No    Back Care No    Exercise Yes    Bike Helmet No    Seat Belt Yes    Self-Exams Yes   Social History Narrative    Not on file     Social Determinants of Health     Financial Resource Strain: Low Risk  (11/17/2023)    Overall Financial Resource Strain (CARDIA)     Difficulty of Paying Living Expenses: Not hard at all   Food Insecurity: No Food Insecurity (11/17/2023)    Hunger Vital Sign     Worried About Running Out of Food in the Last Year: Never true     Ran Out of Food in the Last Year: Never true   Transportation Needs: No Transportation Needs (11/17/2023)    PRAPARE - Transportation     Lack of Transportation (Medical): No     Lack of Transportation (Non-Medical): No   Physical Activity: Sufficiently Active  (11/17/2023)    Exercise Vital Sign     Days of Exercise per Week: 6 days     Minutes of Exercise per Session: 50 min   Stress: Stress Concern Present (11/17/2023)    [employer] - Occupational Stress Questionnaire     Feeling of Stress : To some extent   Social Connections: Moderately Isolated (11/17/2023)    Social Connection and Isolation Panel [NHANES]     Frequency of Communication with Friends and Family: Three times a week     Frequency of Social Gatherings with Friends and Family: Once a week     Attends Orthodox Services: Never     Active Member of Clubs or Organizations: No     Attends Club or Organization Meetings: Never     Marital Status:    Intimate Partner Violence: Not on file   Housing Stability: Low Risk  (11/17/2023)    Housing Stability Vital Sign     Unable to Pay for Housing in the Last Year: No     Number of Places Lived in the Last Year: 1     Unstable Housing in the Last Year: No     No Known Allergies  Outpatient Encounter Medications as of 12/5/2023   Medication Sig Dispense Refill    losartan (COZAAR) 50 MG Tab Take 1.5 Tablets by mouth every day. 135 Tablet 1    metoprolol tartrate (LOPRESSOR) 25 MG Tab Take 1 Tablet by mouth 2 times a day. 60 Tablet 0    brimonidine (ALPHAGAN) 0.2 % Solution Administer 1 Drop into the right eye 2 times a day.      aspirin 81 MG EC tablet Take 81 mg by mouth 1 time a day as needed (Chest Pains).      therapeutic multivitamin-minerals (THERAGRAN-M) Tab Take 1 Tablet by mouth every day.      Turmeric (QC TUMERIC COMPLEX PO) Take 1 Capsule by mouth every day.      atorvastatin (LIPITOR) 20 MG Tab Take 1 Tablet by mouth every day. N THE EVENING 90 Tablet 4    latanoprost (XALATAN) 0.005 % Solution Administer 1 Drop into the right eye every evening.      [DISCONTINUED] traZODone (DESYREL) 50 MG Tab Take 1 Tablet by mouth every evening. (Patient not taking: Reported on 12/5/2023) 90 Tablet 3     No facility-administered  "encounter medications on file as of 12/5/2023.     Review of Systems   Constitutional: Negative.  Negative for chills, fever and malaise/fatigue.   HENT: Negative.     Eyes: Negative.  Negative for blurred vision and double vision.   Respiratory:  Negative for cough and shortness of breath.    Cardiovascular:  Negative for chest pain, palpitations, orthopnea, claudication, leg swelling and PND.   Gastrointestinal: Negative.  Negative for abdominal pain, nausea and vomiting.   Genitourinary: Negative.    Musculoskeletal: Negative.  Negative for myalgias.   Skin: Negative.  Negative for rash.   Neurological:  Positive for loss of consciousness (syncopal episode 12/4). Negative for dizziness, weakness and headaches.   Endo/Heme/Allergies: Negative.  Does not bruise/bleed easily.   Psychiatric/Behavioral:  The patient is nervous/anxious.             Objective     /58 (BP Location: Left arm, Patient Position: Sitting, BP Cuff Size: Adult)   Pulse (!) 59   Resp 14   Ht 1.93 m (6' 4\")   Wt 104 kg (230 lb)   SpO2 96%   BMI 28.00 kg/m²     Physical Exam  Vitals reviewed.   Constitutional:       General: He is not in acute distress.     Appearance: Normal appearance.   HENT:      Head: Normocephalic and atraumatic.      Right Ear: External ear normal.      Left Ear: External ear normal.   Eyes:      General: No scleral icterus.     Extraocular Movements: Extraocular movements intact.      Conjunctiva/sclera: Conjunctivae normal.      Pupils: Pupils are equal, round, and reactive to light.   Cardiovascular:      Rate and Rhythm: Normal rate and regular rhythm.      Pulses: Normal pulses.      Heart sounds: Normal heart sounds. No murmur heard.     No friction rub. No gallop.   Pulmonary:      Effort: Pulmonary effort is normal.      Breath sounds: Normal breath sounds.   Abdominal:      General: Bowel sounds are normal.      Palpations: Abdomen is soft.      Tenderness: There is no abdominal tenderness. "   Musculoskeletal:         General: Normal range of motion.      Cervical back: Normal range of motion and neck supple.      Right lower leg: No edema.      Left lower leg: No edema.   Skin:     General: Skin is warm and dry.      Capillary Refill: Capillary refill takes less than 2 seconds.   Neurological:      General: No focal deficit present.      Mental Status: He is alert and oriented to person, place, and time.   Psychiatric:         Mood and Affect: Mood normal.         Behavior: Behavior normal.         Judgment: Judgment normal.       Lab Results   Component Value Date/Time    CHOLSTRLTOT 102 11/17/2023 06:41 AM    LDL 53 11/17/2023 06:41 AM    HDL 36 (A) 11/17/2023 06:41 AM    TRIGLYCERIDE 64 11/17/2023 06:41 AM       Lab Results   Component Value Date/Time    SODIUM 139 11/17/2023 06:41 AM    POTASSIUM 4.1 11/17/2023 06:41 AM    CHLORIDE 104 11/17/2023 06:41 AM    CO2 26 11/17/2023 06:41 AM    GLUCOSE 100 (H) 11/17/2023 06:41 AM    BUN 12 11/17/2023 06:41 AM    CREATININE 0.94 11/17/2023 06:41 AM    CREATININE 1.2 09/03/2008 03:38 PM    BUNCREATRAT 10 08/02/2019 06:06 AM     Lab Results   Component Value Date/Time    ALKPHOSPHAT 61 11/16/2023 07:32 AM    ASTSGOT 23 11/16/2023 07:32 AM    ALTSGPT 18 11/16/2023 07:32 AM    TBILIRUBIN 1.1 11/16/2023 07:32 AM       Cardiovascular imaging and procedures:    Echocardiogram 10/31/2023  CONCLUSIONS  Compared to the prior study on 12/27/22, no changes.  Normal left ventricular systolic function.   No evidence of valvular abnormality based on Doppler evaluation.   Normal aortic root for body surface area.the ascending aorta is dilated   with a diameter of  4.3cm.    Echocardiogram 12/27/2022  CONCLUSIONS  Compared to the images of the prior study 10/01/2021,  no changes.  Normal left ventricular systolic function.   Mild mitral regurgitation.  Aortic root dilatation 4.0 cm. The ascending aorta is aneurysmal with a   diameter of 4.4 cm.  Right ventricular systolic  pressure is estimated to be 38 mmHg.    NM stress testing 11/17/2023  Myocardial Perfusion   Report   NUCLEAR IMAGING INTERPRETATION   Small nonreversible defect in the apical segment suggestive of infarct. No    evidence of reversible perfusion defect to suggest ischemia. Left ventricular      ejection fraction measures 50%.   ECG INTERPRETATION   Nondiagnostic due to pharmacologic stress test.         Assessment & Plan     1. Primary hypertension        2. Ascending aorta dilatation (HCC)        3. Dyslipidemia        4. Insomnia, unspecified type  Referral to Pulmonary and Sleep Medicine      5. Meniere's disease, unspecified laterality            Medical Decision Making: Today's Assessment/Status/Plan:        Hypertension  Syncope  -Recent hospital visit for hypertensive urgency.  Blood pressure is now well-controlled after medication changes.  -Encouraged him to monitor his blood pressure daily at home.  -He did experience a syncopal event on 12/4, but this was isolated and seemed to occur with taking trazodone.   -Suggested he could also split up losartan to 50 in the morning and 25 in the evening if needed.  Since this was an isolated event, no additional cardiac testing is needed at this time, but if he does have a recurrence could consider cardiac event monitoring.  -Continue metoprolol 25 mg twice daily and losartan 75 mg daily.    Ascending aortic aneurysm  Chest pain  -4.5 without thrombus on recent CTA   -Repeat CTA yearly to monitor for progression.  Next 11/2024  -Continue to encourage tight blood pressure control.  -No further episodes of chest pain and NM stress testing was positive for infarct without reversible ischemia.    Hyperlipidemia  -LDL 53 on recent labs  -Continue atorvastatin 20 mg daily    Insomnia  -He has tried a myriad of medications without positive results.  - He just tried trazodone and had a syncopal event so he would like to avoid this.   -Will place referral to sleep medicine  to assess further.    Follow-up in 4 weeks to reassess blood pressure.    Encouraged him to reach out via MyChart or telephone with any questions or concerns.    Thank you for allowing me to participate in the care of Tyler Live .    Brisa Coates PA-C, Cardiology  [facility]  [address] [place] [address]  Phone: [phone]  Fax: [phone]    PLEASE NOTE: This note was created using voice recognition software. I have made every reasonable attempt to correct obvious errors, but I expect that there are errors of grammar and possibly content that I did not discover before finalizing the note.     I personally spent a total of 35 minutes which includes face-to-face time and non-face-to-face time spent on preparing to see the patient, reviewing hospital notes and tests, obtaining history from the patient, performing a medically appropriate exam, counseling and educating the patient, ordering medications/tests/procedures/referrals as clinically indicated, and documenting information in the electronic medical record.

## 2023-12-13 ASSESSMENT — ENCOUNTER SYMPTOMS
SHORTNESS OF BREATH: 0
WEAKNESS: 0
EYES NEGATIVE: 1
ORTHOPNEA: 0
LOSS OF CONSCIOUSNESS: 1
GASTROINTESTINAL NEGATIVE: 1
ABDOMINAL PAIN: 0
HEADACHES: 0
COUGH: 0
DOUBLE VISION: 0
FEVER: 0
CONSTITUTIONAL NEGATIVE: 1
BLURRED VISION: 0
MYALGIAS: 0
CLAUDICATION: 0
MUSCULOSKELETAL NEGATIVE: 1
DIZZINESS: 0
NAUSEA: 0
VOMITING: 0
CHILLS: 0
PND: 0
PALPITATIONS: 0
BRUISES/BLEEDS EASILY: 0

## 2023-12-26 ENCOUNTER — PATIENT MESSAGE (OUTPATIENT)
Dept: CARDIOLOGY | Facility: MEDICAL CENTER | Age: 68
End: 2023-12-26

## 2023-12-26 ENCOUNTER — HOSPITAL ENCOUNTER (OUTPATIENT)
Dept: LAB | Facility: MEDICAL CENTER | Age: 68
End: 2023-12-26
Attending: FAMILY MEDICINE
Payer: MEDICARE

## 2023-12-26 ENCOUNTER — OFFICE VISIT (OUTPATIENT)
Dept: MEDICAL GROUP | Facility: MEDICAL CENTER | Age: 68
End: 2023-12-26
Payer: MEDICARE

## 2023-12-26 VITALS
BODY MASS INDEX: 29.35 KG/M2 | DIASTOLIC BLOOD PRESSURE: 90 MMHG | TEMPERATURE: 98 F | HEIGHT: 76 IN | RESPIRATION RATE: 16 BRPM | WEIGHT: 241 LBS | HEART RATE: 62 BPM | OXYGEN SATURATION: 98 % | SYSTOLIC BLOOD PRESSURE: 140 MMHG

## 2023-12-26 DIAGNOSIS — I10 PRIMARY HYPERTENSION: ICD-10-CM

## 2023-12-26 DIAGNOSIS — R39.14 BENIGN PROSTATIC HYPERPLASIA WITH INCOMPLETE BLADDER EMPTYING: ICD-10-CM

## 2023-12-26 DIAGNOSIS — N40.1 BENIGN PROSTATIC HYPERPLASIA WITH INCOMPLETE BLADDER EMPTYING: ICD-10-CM

## 2023-12-26 DIAGNOSIS — E78.2 MIXED HYPERLIPIDEMIA: ICD-10-CM

## 2023-12-26 LAB — PSA SERPL-MCNC: 1.97 NG/ML (ref 0–4)

## 2023-12-26 PROCEDURE — 3077F SYST BP >= 140 MM HG: CPT | Performed by: FAMILY MEDICINE

## 2023-12-26 PROCEDURE — 84153 ASSAY OF PSA TOTAL: CPT

## 2023-12-26 PROCEDURE — 99214 OFFICE O/P EST MOD 30 MIN: CPT | Performed by: FAMILY MEDICINE

## 2023-12-26 PROCEDURE — 36415 COLL VENOUS BLD VENIPUNCTURE: CPT

## 2023-12-26 PROCEDURE — 3080F DIAST BP >= 90 MM HG: CPT | Performed by: FAMILY MEDICINE

## 2023-12-26 RX ORDER — TAMSULOSIN HYDROCHLORIDE 0.4 MG/1
0.4 CAPSULE ORAL
Qty: 90 CAPSULE | Refills: 2 | Status: SHIPPED | OUTPATIENT
Start: 2023-12-26

## 2023-12-26 RX ORDER — LOSARTAN POTASSIUM 50 MG/1
TABLET ORAL
Qty: 180 TABLET | Refills: 2 | Status: SHIPPED | OUTPATIENT
Start: 2023-12-26 | End: 2024-01-16

## 2023-12-26 RX ORDER — KETOCONAZOLE 20 MG/G
CREAM TOPICAL
COMMUNITY
Start: 2023-12-04

## 2023-12-26 ASSESSMENT — FIBROSIS 4 INDEX: FIB4 SCORE: 2.11

## 2023-12-26 NOTE — PATIENT COMMUNICATION
Is the patient due for a refill? Yes    Was the patient seen the past year? Yes    Date of last office visit: 12/5/2023    Does the patient have an upcoming appointment?  Yes   If yes, When? 1/5/2024    Provider to refill:AM    Does the patients insurance require a 100 day supply?  No

## 2023-12-27 NOTE — PROGRESS NOTES
CC: Hypertension, hyperlipidemia, BPH    HPI:   Tyler presents today to discuss the following:    Primary hypertension  Patient stated that he has been checking his blood pressure and it has always been high when he checked his blood pressure immediately after he wakes up.  However he denies any headache, chest pain, shortness of breath.  He has been on losartan 50 mg in the morning and 25 mg in the evening.      Mixed hyperlipidemia  He has been tolerating the statin. Denies muscle pain LFTs has been normal, he is currently on atorvastatin 20 mg daily    Benign prostatic hyperplasia with incomplete bladder emptying  Patient has been symptomatic for the past 2 years, but lately it has worsened.  Patient stated that he has been having increased urinary frequency, he wakes about 3-4 times at night to pee.  Currently not on medication.  He was offered to medication 2 years ago but he declined it, because his symptom was not that bad.  Denies any history of blood in the urine or urinary retention.       Patient Active Problem List    Diagnosis Date Noted    Primary hypertension 12/26/2023    Mixed hyperlipidemia 12/26/2023    Hypertensive urgency 11/16/2023    Chest pain 11/16/2023    Headache 11/16/2023    Benign prostatic hyperplasia with incomplete bladder emptying 04/03/2023    Arachnoid cyst of posterior cranial fossa 04/03/2023    Primary open angle glaucoma (POAG) of both eyes 04/03/2023    Primary osteoarthritis of left hip 10/20/2021    Cervical radiculopathy 06/05/2020    Gastroesophageal reflux disease without esophagitis 06/05/2020    Adenomatous polyp of sigmoid colon 06/05/2020    Dyslipidemia 08/01/2019    Ascending aorta dilatation (HCC) 08/08/2017    Meniere disease 07/27/2017    Insomnia due to medical condition 02/17/2015    Eczema 02/17/2015       Current Outpatient Medications   Medication Sig Dispense Refill    ketoconazole (NIZORAL) 2 % Cream APPLY THIN FILM TO SCALY RASH ON FACE/EARS TWICE  "DAILY X 4 WEEKS. REPEAT FOR FLARES      losartan (COZAAR) 50 MG Tab Take 1 tablet twice a day 180 Tablet 2    metoprolol tartrate (LOPRESSOR) 25 MG Tab Take 1 Tablet by mouth 2 times a day. 60 Tablet 0    brimonidine (ALPHAGAN) 0.2 % Solution Administer 1 Drop into the right eye 2 times a day.      aspirin 81 MG EC tablet Take 81 mg by mouth 1 time a day as needed (Chest Pains).      therapeutic multivitamin-minerals (THERAGRAN-M) Tab Take 1 Tablet by mouth every day.      Turmeric (QC TUMERIC COMPLEX PO) Take 1 Capsule by mouth every day.      atorvastatin (LIPITOR) 20 MG Tab Take 1 Tablet by mouth every day. N THE EVENING 90 Tablet 4    latanoprost (XALATAN) 0.005 % Solution Administer 1 Drop into the right eye every evening.       No current facility-administered medications for this visit.         Allergies as of 12/26/2023    (No Known Allergies)        ROS: Denies any chest pain, Shortness of breath, Changes bowel or bladder, Lower extremity edema.    Physical Exam:  BP (!) 140/90 (BP Location: Right arm, Patient Position: Sitting, BP Cuff Size: Adult)   Pulse 62   Temp 36.7 °C (98 °F) (Temporal)   Resp 16   Ht 1.93 m (6' 4\")   Wt 109 kg (241 lb)   SpO2 98%   BMI 29.34 kg/m²   Gen.: Well-developed, well-nourished, no apparent distress,pleasant and cooperative with the examination  Skin:  Warm and dry with good turgor. No rashes or suspicious lesions in visible areas  HEENT:Sinuses nontender with palpation, TMs clear, nares patent with pink mucosa and clear rhinorrhea,no septal deviation ,polyps or lesions. lips without lesions, oropharynx clear.  Neck: Trachea midline,no masses or adenopathy. No JVD.  Cor: Regular rate and rhythm without murmur, gallop or rub.  Lungs: Respirations unlabored.Clear to auscultation with equal breath sounds bilaterally. No wheezes, rhonchi.  Extremities: No cyanosis, clubbing or edema.      Assessment and Plan.   68 y.o. male     1. Primary hypertension  Blood pressure has " been slightly high especially in the morning.  Will increase losartan to 50 mg twice a day.  Patient advised to check his blood pressure twice a day for a week and send it to me for evaluation    - losartan (COZAAR) 50 MG Tab; Take 1 tablet twice a day  Dispense: 180 Tablet; Refill: 2    2. Mixed hyperlipidemia  He has been tolerating the statin. Denies muscle pain LFTs has been normal  Continue on atorvastatin 20 mg daily    3. Benign prostatic hyperplasia with incomplete bladder emptying  Patient has been symptomatic for the past 2 years, but lately it has worsened.  Last PSA was checked in 2020 was normal.  Will start patient on Flomax 0.4 mg daily  Will check PSA    - PROSTATE SPECIFIC AG SCREENING; Future  - tamsulosin (FLOMAX) 0.4 MG capsule; Take 1 Capsule by mouth 1/2 hour after breakfast.  Dispense: 90 Capsule; Refill: 2

## 2024-01-04 ENCOUNTER — OFFICE VISIT (OUTPATIENT)
Dept: CARDIOLOGY | Facility: MEDICAL CENTER | Age: 69
End: 2024-01-04
Attending: PHYSICIAN ASSISTANT
Payer: MEDICARE

## 2024-01-04 VITALS
DIASTOLIC BLOOD PRESSURE: 62 MMHG | HEART RATE: 63 BPM | HEIGHT: 76 IN | SYSTOLIC BLOOD PRESSURE: 110 MMHG | BODY MASS INDEX: 28.86 KG/M2 | RESPIRATION RATE: 12 BRPM | OXYGEN SATURATION: 96 % | WEIGHT: 237 LBS

## 2024-01-04 DIAGNOSIS — I10 PRIMARY HYPERTENSION: ICD-10-CM

## 2024-01-04 DIAGNOSIS — R53.82 CHRONIC FATIGUE: ICD-10-CM

## 2024-01-04 DIAGNOSIS — E78.5 DYSLIPIDEMIA: ICD-10-CM

## 2024-01-04 DIAGNOSIS — R07.89 OTHER CHEST PAIN: ICD-10-CM

## 2024-01-04 DIAGNOSIS — G47.00 INSOMNIA, UNSPECIFIED TYPE: ICD-10-CM

## 2024-01-04 DIAGNOSIS — I77.810 ASCENDING AORTA DILATATION (HCC): ICD-10-CM

## 2024-01-04 DIAGNOSIS — R55 SYNCOPE AND COLLAPSE: ICD-10-CM

## 2024-01-04 PROCEDURE — 3074F SYST BP LT 130 MM HG: CPT | Performed by: PHYSICIAN ASSISTANT

## 2024-01-04 PROCEDURE — 99212 OFFICE O/P EST SF 10 MIN: CPT | Performed by: PHYSICIAN ASSISTANT

## 2024-01-04 PROCEDURE — 3078F DIAST BP <80 MM HG: CPT | Performed by: PHYSICIAN ASSISTANT

## 2024-01-04 PROCEDURE — 99214 OFFICE O/P EST MOD 30 MIN: CPT | Performed by: PHYSICIAN ASSISTANT

## 2024-01-04 ASSESSMENT — FIBROSIS 4 INDEX: FIB4 SCORE: 2.11

## 2024-01-04 NOTE — PROGRESS NOTES
Chief Complaint   Patient presents with    Hypertension       Subjective     Mike Live is a 68 y.o. male with a history of aortic dilation, hyperlipidemia and hypertension who presents today for hospital follow-up     His primary cardiologist is Dr. Rosa. Last seen 12/5/2023 by me. At that exam, he was seen for a hospital follow up after an ED visit for chest pain. He had undergone a stress test which was suggestive of a scar without any reversibility. He also had hypertensive urgency and was started on losartan. At his follow up he was doing well after the hospital visit other than one episode of syncope which he attributed to trazodone. His medications were continued and he was given a referral to sleep medicine for his insomnia. He was to follow up in 4 weeks to reassess blood pressure.     Today, he reports he is doing well. He has not had any further episodes of syncope. He does still have occasional sharp chest pains, but he attributes this to heavy physical activity. He is able to play pickleball for hours without any symptoms. He does feel more fatigue and believes it may be related to the BID dosing of metoprolol because he feels as though he cannot get his heart rate high enough during exercise. No palpitations. No shortness of breath, dyspnea on exertion, orthopnea or PND. No lower extremity edema. No dizziness or lightheadedness. No syncope or presyncope.      His blood pressure has mostly been in the 110s-120s/50s-60s. He has had rare readings into the 90s systolic, but notes this is usually a few hours after intense physical activity.     Past Medical History:   Diagnosis Date    Hypertension      Past Surgical History:   Procedure Laterality Date    SD TOTAL HIP ARTHROPLASTY Left 11/2/2021    Procedure: ARTHROPLASTY, HIP, TOTAL, ANTERIOR APPROACH;  Surgeon: Costa Pemberton M.D.;  Location: [facility];  Service: Orthopedics    CATARACT EXTRACTION WITH IOL Bilateral      COLONOSCOPY  age 50    neg    EYE SURGERY  age 4    R eye,trauma     Family History   Problem Relation Age of Onset    Cancer Father         melanoma    GI Disease Mother     Cancer Sister         breast    Cancer Maternal Grandmother         kidney    Cancer Maternal Grandfather         prostate    Cancer Paternal Grandmother         liver    Cancer Paternal Grandfather         throat    Diabetes Neg Hx     Heart Disease Neg Hx     Stroke Neg Hx      Social History     Socioeconomic History    Marital status:      Spouse name: Not on file    Number of children: Not on file    Years of education: Not on file    Highest education level: Bachelor's degree (e.g., BA, AB, BS)   Occupational History    Not on file   Tobacco Use    Smoking status: Never    Smokeless tobacco: Never   Vaping Use    Vaping Use: Never used   Substance and Sexual Activity    Alcohol use: Not Currently     Comment: rare    Drug use: No    Sexual activity: Yes     Partners: Female     Comment: ,  [employer]   Other Topics Concern     Service No    Blood Transfusions No    Caffeine Concern No    Occupational Exposure No    Hobby Hazards No    Sleep Concern Yes    Stress Concern No    Weight Concern No    Special Diet No    Back Care No    Exercise Yes    Bike Helmet No    Seat Belt Yes    Self-Exams Yes   Social History Narrative    Not on file     Social Determinants of Health     Financial Resource Strain: Low Risk  (11/17/2023)    Overall Financial Resource Strain (CARDIA)     Difficulty of Paying Living Expenses: Not hard at all   Food Insecurity: No Food Insecurity (11/17/2023)    Hunger Vital Sign     Worried About Running Out of Food in the Last Year: Never true     Ran Out of Food in the Last Year: Never true   Transportation Needs: No Transportation Needs (11/17/2023)    PRAPARE - Transportation     Lack of Transportation (Medical): No     Lack of Transportation (Non-Medical): No   Physical Activity:  Sufficiently Active (11/17/2023)    Exercise Vital Sign     Days of Exercise per Week: 6 days     Minutes of Exercise per Session: 50 min   Stress: Stress Concern Present (11/17/2023)    [employer] - Occupational Stress Questionnaire     Feeling of Stress : To some extent   Social Connections: Moderately Isolated (11/17/2023)    Social Connection and Isolation Panel [NHANES]     Frequency of Communication with Friends and Family: Three times a week     Frequency of Social Gatherings with Friends and Family: Once a week     Attends Yarsani Services: Never     Active Member of Clubs or Organizations: No     Attends Club or Organization Meetings: Never     Marital Status:    Intimate Partner Violence: Not on file   Housing Stability: Low Risk  (11/17/2023)    Housing Stability Vital Sign     Unable to Pay for Housing in the Last Year: No     Number of Places Lived in the Last Year: 1     Unstable Housing in the Last Year: No     No Known Allergies  Outpatient Encounter Medications as of 1/4/2024   Medication Sig Dispense Refill    metoprolol tartrate (LOPRESSOR) 25 MG Tab Take 1 Tablet by mouth 2 times a day. 180 Tablet 3    ketoconazole (NIZORAL) 2 % Cream APPLY THIN FILM TO SCALY RASH ON FACE/EARS TWICE DAILY X 4 WEEKS. REPEAT FOR FLARES      losartan (COZAAR) 50 MG Tab Take 1 tablet twice a day 180 Tablet 2    tamsulosin (FLOMAX) 0.4 MG capsule Take 1 Capsule by mouth 1/2 hour after breakfast. 90 Capsule 2    brimonidine (ALPHAGAN) 0.2 % Solution Administer 1 Drop into the right eye 2 times a day.      aspirin 81 MG EC tablet Take 81 mg by mouth 1 time a day as needed (Chest Pains).      therapeutic multivitamin-minerals (THERAGRAN-M) Tab Take 1 Tablet by mouth every day.      Turmeric (QC TUMERIC COMPLEX PO) Take 1 Capsule by mouth every day.      atorvastatin (LIPITOR) 20 MG Tab Take 1 Tablet by mouth every day. N THE EVENING 90 Tablet 4    latanoprost (XALATAN) 0.005 %  "Solution Administer 1 Drop into the right eye every evening.      [DISCONTINUED] metoprolol tartrate (LOPRESSOR) 25 MG Tab Take 1 Tablet by mouth 2 times a day. 60 Tablet 0     No facility-administered encounter medications on file as of 1/4/2024.     Review of Systems   Constitutional: Negative.  Negative for chills, fever and malaise/fatigue.   HENT: Negative.     Eyes: Negative.  Negative for blurred vision and double vision.   Respiratory:  Negative for cough and shortness of breath.    Cardiovascular:  Positive for chest pain. Negative for palpitations, orthopnea, claudication, leg swelling and PND.   Gastrointestinal: Negative.  Negative for abdominal pain, nausea and vomiting.   Genitourinary: Negative.    Musculoskeletal: Negative.  Negative for myalgias.   Skin: Negative.  Negative for rash.   Neurological: Negative.  Negative for dizziness, loss of consciousness, weakness and headaches.   Endo/Heme/Allergies: Negative.  Does not bruise/bleed easily.   Psychiatric/Behavioral: Negative.                Objective     /62 (BP Location: Right arm, Patient Position: Sitting, BP Cuff Size: Adult)   Pulse 63   Resp 12   Ht 1.93 m (6' 4\")   Wt 108 kg (237 lb)   SpO2 96%   BMI 28.85 kg/m²     Physical Exam  Vitals reviewed.   Constitutional:       General: He is not in acute distress.     Appearance: Normal appearance.   HENT:      Head: Normocephalic and atraumatic.      Right Ear: External ear normal.      Left Ear: External ear normal.   Eyes:      General: No scleral icterus.     Extraocular Movements: Extraocular movements intact.      Conjunctiva/sclera: Conjunctivae normal.      Pupils: Pupils are equal, round, and reactive to light.   Cardiovascular:      Rate and Rhythm: Normal rate and regular rhythm.      Pulses: Normal pulses.      Heart sounds: Normal heart sounds. No murmur heard.     No friction rub. No gallop.   Pulmonary:      Effort: Pulmonary effort is normal.      Breath sounds: Normal " breath sounds.   Abdominal:      General: Bowel sounds are normal.      Palpations: Abdomen is soft.      Tenderness: There is no abdominal tenderness.   Musculoskeletal:         General: Normal range of motion.      Cervical back: Normal range of motion and neck supple.      Right lower leg: No edema.      Left lower leg: No edema.   Skin:     General: Skin is warm and dry.      Capillary Refill: Capillary refill takes less than 2 seconds.   Neurological:      General: No focal deficit present.      Mental Status: He is alert and oriented to person, place, and time.   Psychiatric:         Mood and Affect: Mood normal.         Behavior: Behavior normal.         Judgment: Judgment normal.       Lab Results   Component Value Date/Time    CHOLSTRLTOT 102 11/17/2023 06:41 AM    LDL 53 11/17/2023 06:41 AM    HDL 36 (A) 11/17/2023 06:41 AM    TRIGLYCERIDE 64 11/17/2023 06:41 AM       Lab Results   Component Value Date/Time    SODIUM 139 11/17/2023 06:41 AM    POTASSIUM 4.1 11/17/2023 06:41 AM    CHLORIDE 104 11/17/2023 06:41 AM    CO2 26 11/17/2023 06:41 AM    GLUCOSE 100 (H) 11/17/2023 06:41 AM    BUN 12 11/17/2023 06:41 AM    CREATININE 0.94 11/17/2023 06:41 AM    CREATININE 1.2 09/03/2008 03:38 PM    BUNCREATRAT 10 08/02/2019 06:06 AM     Lab Results   Component Value Date/Time    ALKPHOSPHAT 61 11/16/2023 07:32 AM    ASTSGOT 23 11/16/2023 07:32 AM    ALTSGPT 18 11/16/2023 07:32 AM    TBILIRUBIN 1.1 11/16/2023 07:32 AM       Cardiovascular imaging and procedures:     Echocardiogram 10/31/2023  CONCLUSIONS  Compared to the prior study on 12/27/22, no changes.  Normal left ventricular systolic function.   No evidence of valvular abnormality based on Doppler evaluation.   Normal aortic root for body surface area.the ascending aorta is dilated   with a diameter of  4.3cm.     Echocardiogram 12/27/2022  CONCLUSIONS  Compared to the images of the prior study 10/01/2021,  no changes.  Normal left ventricular systolic  function.   Mild mitral regurgitation.  Aortic root dilatation 4.0 cm. The ascending aorta is aneurysmal with a   diameter of 4.4 cm.  Right ventricular systolic pressure is estimated to be 38 mmHg.     NM stress testing 11/17/2023  Myocardial Perfusion   Report   NUCLEAR IMAGING INTERPRETATION   Small nonreversible defect in the apical segment suggestive of infarct. No    evidence of reversible perfusion defect to suggest ischemia. Left ventricular   ejection fraction measures 50%.   ECG INTERPRETATION   Nondiagnostic due to pharmacologic stress test.         Assessment & Plan     1. Primary hypertension  metoprolol tartrate (LOPRESSOR) 25 MG Tab      2. Syncope and collapse        3. Ascending aorta dilatation (HCC)        4. Other chest pain        5. Dyslipidemia        6. Insomnia, unspecified type        7. Chronic fatigue            Medical Decision Making: Today's Assessment/Status/Plan:      Hypertension  Syncope  Fatigue  -Recent hospital visit for hypertensive urgency in November.  Blood pressure is now well-controlled after medication changes.  -Encouraged him to continue to monitor his blood pressure daily at home.  -He did experience a syncopal event on 12/4, but this was isolated and seemed to occur with taking trazodone. He has not had a recurrence.   - Discussed changing metoprolol to XL to see if it helps his fatigue, but since his blood pressure is just barely under control, he would like to hold off for now which is reasonable.   -Continue metoprolol 25 mg twice daily and losartan 50mg in the morning and 25mg at night.      Ascending aortic aneurysm  Chest pain  -4.5 without thrombus on recent CTA   -Repeat CTA yearly to monitor for progression.  Next 11/2024  -Continue to encourage tight blood pressure control.  -Chest pain is very atypical and NM stress testing was positive for infarct without reversible ischemia. Discussed considering the addition of nitro as needed. He would like to hold off  at this time.   - Discussed ED precautions.      Hyperlipidemia  -LDL 53 on recent labs  -Continue atorvastatin 20 mg daily     Insomnia  -He has tried a myriad of medications without positive results.  - He tried trazodone and had a syncopal event so he would like to avoid this.   -Referral to sleep medicine placed at last exam. He is scheduled in July for this.                                                                                                                                                                                                                                                                                                                                                                                                                                                                                                                                                                                                                                                                                                                                                                                                                                                                             Follow-up in 3 months or sooner with clinical changes.      Encouraged him to reach out via MyChart or telephone with any questions or concerns.     Thank you for allowing me to participate in the care of Tyler Live .    Brisa Coates PA-C, Cardiology  [facility], [address]  [address] [address]  Phone: [phone]  Fax: [phone]     PLEASE NOTE: This note was created using voice recognition software. I have made every reasonable attempt to correct obvious errors, but I expect that there are errors of grammar and possibly content that I did not discover before finalizing the note.

## 2024-01-05 ENCOUNTER — APPOINTMENT (OUTPATIENT)
Dept: CARDIOLOGY | Facility: MEDICAL CENTER | Age: 69
End: 2024-01-05
Attending: PHYSICIAN ASSISTANT
Payer: MEDICARE

## 2024-01-05 ASSESSMENT — ENCOUNTER SYMPTOMS
VOMITING: 0
EYES NEGATIVE: 1
PND: 0
NEUROLOGICAL NEGATIVE: 1
FEVER: 0
LOSS OF CONSCIOUSNESS: 0
ABDOMINAL PAIN: 0
GASTROINTESTINAL NEGATIVE: 1
NAUSEA: 0
BRUISES/BLEEDS EASILY: 0
WEAKNESS: 0
COUGH: 0
CONSTITUTIONAL NEGATIVE: 1
SHORTNESS OF BREATH: 0
DIZZINESS: 0
DOUBLE VISION: 0
CLAUDICATION: 0
CHILLS: 0
HEADACHES: 0
MUSCULOSKELETAL NEGATIVE: 1
PALPITATIONS: 0
PSYCHIATRIC NEGATIVE: 1
BLURRED VISION: 0
ORTHOPNEA: 0
MYALGIAS: 0

## 2024-01-12 ENCOUNTER — APPOINTMENT (OUTPATIENT)
Dept: MEDICAL GROUP | Facility: MEDICAL CENTER | Age: 69
End: 2024-01-12
Payer: MEDICARE

## 2024-01-13 ENCOUNTER — PATIENT MESSAGE (OUTPATIENT)
Dept: CARDIOLOGY | Facility: MEDICAL CENTER | Age: 69
End: 2024-01-13
Payer: MEDICARE

## 2024-01-15 NOTE — PATIENT COMMUNICATION
Phone number called: [phone] (home)     To AM: Pt reports that his BP has been in the high 80's to low 90's SBP. He also states that he gets dizzy from sitting to standing up. Pt states that he had cut down his losartan in half and is now taking losartan 25mg x2 daily. Pt has not noticed any difference and still feel dizzy. Please advise. Thank you

## 2024-01-16 ENCOUNTER — PATIENT MESSAGE (OUTPATIENT)
Dept: CARDIOLOGY | Facility: MEDICAL CENTER | Age: 69
End: 2024-01-16
Payer: MEDICARE

## 2024-01-16 DIAGNOSIS — I10 PRIMARY HYPERTENSION: ICD-10-CM

## 2024-01-16 RX ORDER — LOSARTAN POTASSIUM 25 MG/1
TABLET ORAL
Qty: 90 TABLET | Refills: 0 | Status: SHIPPED | OUTPATIENT
Start: 2024-01-16 | End: 2024-03-01

## 2024-01-16 NOTE — PATIENT COMMUNICATION
Brisa Coates P.A.-C.  You15 hours ago (4:36 PM)     AM  Reduce losartan to 25mg daily with the additional 25mg PRN. Please also make sure he is staying well hydrated.     Portable Scores message sent to patient.    negative Affect and characteristics of appearance, verbalizations, behaviors are appropriate

## 2024-01-19 DIAGNOSIS — I10 PRIMARY HYPERTENSION: ICD-10-CM

## 2024-01-19 NOTE — TELEPHONE ENCOUNTER
Received request via: Pharmacy    Was the patient seen in the last year in this department? Yes    Does the patient have an active prescription (recently filled or refills available) for medication(s) requested? No    Pharmacy Name: [facility]    Does the patient have [facility] Plus and need 100 day supply (blood pressure, diabetes and cholesterol meds only)? Yes, quantity updated to 100 days

## 2024-01-24 DIAGNOSIS — E78.5 DYSLIPIDEMIA: ICD-10-CM

## 2024-01-24 RX ORDER — ATORVASTATIN CALCIUM 20 MG/1
20 TABLET, FILM COATED ORAL
Qty: 90 TABLET | Refills: 3 | Status: SHIPPED | OUTPATIENT
Start: 2024-01-24

## 2024-03-01 DIAGNOSIS — I10 PRIMARY HYPERTENSION: ICD-10-CM

## 2024-03-01 RX ORDER — LOSARTAN POTASSIUM 25 MG/1
TABLET ORAL
Qty: 90 TABLET | Refills: 3 | Status: SHIPPED | OUTPATIENT
Start: 2024-03-01 | End: 2024-03-04 | Stop reason: SDUPTHER

## 2024-03-01 NOTE — TELEPHONE ENCOUNTER
Is the patient due for a refill? Yes    Was the patient seen the past year? Yes    Date of last office visit: 01/04/2024    Does the patient have an upcoming appointment?  YES   If yes, When? 04/12/2024    Provider to refill:AM    Does the patients insurance require a 100 day supply?  No

## 2024-03-04 ENCOUNTER — PATIENT MESSAGE (OUTPATIENT)
Dept: CARDIOLOGY | Facility: MEDICAL CENTER | Age: 69
End: 2024-03-04
Payer: MEDICARE

## 2024-03-04 DIAGNOSIS — I10 PRIMARY HYPERTENSION: ICD-10-CM

## 2024-03-04 RX ORDER — LOSARTAN POTASSIUM 25 MG/1
TABLET ORAL
Qty: 135 TABLET | Refills: 3 | Status: SHIPPED | OUTPATIENT
Start: 2024-03-04

## 2024-03-04 NOTE — PATIENT COMMUNICATION
Pharmacy change medication refill    Is the patient due for a refill? Yes    Was the patient seen the past year? Yes    Date of last office visit: 1.4.2024    Does the patient have an upcoming appointment?  Yes   If yes, When? 4.12.2024    Provider to refill:AM    Does the patients insurance require a 100 day supply?  No

## 2024-04-12 ENCOUNTER — OFFICE VISIT (OUTPATIENT)
Dept: CARDIOLOGY | Facility: MEDICAL CENTER | Age: 69
End: 2024-04-12
Attending: PHYSICIAN ASSISTANT
Payer: MEDICARE

## 2024-04-12 VITALS
OXYGEN SATURATION: 95 % | BODY MASS INDEX: 28.71 KG/M2 | DIASTOLIC BLOOD PRESSURE: 68 MMHG | WEIGHT: 235.8 LBS | SYSTOLIC BLOOD PRESSURE: 118 MMHG | RESPIRATION RATE: 14 BRPM | HEART RATE: 61 BPM | HEIGHT: 76 IN

## 2024-04-12 DIAGNOSIS — E78.5 DYSLIPIDEMIA: ICD-10-CM

## 2024-04-12 DIAGNOSIS — I77.810 ASCENDING AORTA DILATATION (HCC): ICD-10-CM

## 2024-04-12 DIAGNOSIS — R07.89 OTHER CHEST PAIN: ICD-10-CM

## 2024-04-12 DIAGNOSIS — R55 SYNCOPE AND COLLAPSE: ICD-10-CM

## 2024-04-12 DIAGNOSIS — R53.82 CHRONIC FATIGUE: ICD-10-CM

## 2024-04-12 DIAGNOSIS — I10 PRIMARY HYPERTENSION: ICD-10-CM

## 2024-04-12 DIAGNOSIS — G47.00 INSOMNIA, UNSPECIFIED TYPE: ICD-10-CM

## 2024-04-12 PROCEDURE — 99214 OFFICE O/P EST MOD 30 MIN: CPT | Performed by: PHYSICIAN ASSISTANT

## 2024-04-12 PROCEDURE — 3074F SYST BP LT 130 MM HG: CPT | Performed by: PHYSICIAN ASSISTANT

## 2024-04-12 PROCEDURE — 3078F DIAST BP <80 MM HG: CPT | Performed by: PHYSICIAN ASSISTANT

## 2024-04-12 RX ORDER — LOSARTAN POTASSIUM 25 MG/1
25 TABLET ORAL DAILY
Qty: 90 TABLET | Refills: 3 | Status: SHIPPED | OUTPATIENT
Start: 2024-04-12

## 2024-04-12 ASSESSMENT — MINNESOTA LIVING WITH HEART FAILURE QUESTIONNAIRE (MLHF)
DIFFICULTY WITH SEXUAL ACTIVITIES: 0
DIFFICULTY SOCIALIZING WITH FAMILY OR FRIENDS: 0
GIVING YOU SIDE EFFECTS FROM TREATMENTS: 0
TOTAL_SCORE: 1
HAVING TO SIT OR LIE DOWN DURING THE DAY: 0
TIRED, FATIGUED OR LOW ON ENERGY: 1
SWELLING IN ANKLES OR LEGS: 0
DIFFICULTY WORKING TO EARN A LIVING: 0
MAKING YOU SHORT OF BREATH: 0
EATING LESS FOODS YOU LIKE: 0
WORKING AROUND THE HOUSE OR YARD DIFFICULT: 0
DIFFICULTY GOING AWAY FROM HOME: 0
FEELING LIKE A BURDEN TO FAMILY AND FRIENDS: 0
MAKING YOU FEEL DEPRESSED: 0
DIFFICULTY WITH RECREATIONAL PASTIMES, SPORTS, HOBBIES: 0
DIFFICULTY SLEEPING WELL AT NIGHT: 0
MAKING YOU STAY IN A HOSPITAL: 0
LOSS OF SELF CONTROL IN YOUR LIFE: 0
DIFFICULTY TO CONCENTRATE OR REMEMBERING THINGS: 0
WALKING ABOUT OR CLIMBING STAIRS DIFFICULT: 0
COSTING YOU MONEY FOR MEDICAL CARE: 0
MAKING YOU WORRY: 0

## 2024-04-12 ASSESSMENT — FIBROSIS 4 INDEX: FIB4 SCORE: 2.11

## 2024-04-12 NOTE — PROGRESS NOTES
Chief Complaint   Patient presents with    Hypertension     Primary hypertension        Hyperlipidemia     Mixed hyperlipidemia         Subjective     Mike Live is a 68 y.o. male who presents today     Past Medical History:   Diagnosis Date    Hypertension      Past Surgical History:   Procedure Laterality Date    FL TOTAL HIP ARTHROPLASTY Left 11/2/2021    Procedure: ARTHROPLASTY, HIP, TOTAL, ANTERIOR APPROACH;  Surgeon: Costa Pemberton M.D.;  Location: [facility];  Service: Orthopedics    CATARACT EXTRACTION WITH IOL Bilateral     COLONOSCOPY  age 50    neg    EYE SURGERY  age 4    R eye,trauma     Family History   Problem Relation Age of Onset    Cancer Father         melanoma    GI Disease Mother     Cancer Sister         breast    Cancer Maternal Grandmother         kidney    Cancer Maternal Grandfather         prostate    Cancer Paternal Grandmother         liver    Cancer Paternal Grandfather         throat    Diabetes Neg Hx     Heart Disease Neg Hx     Stroke Neg Hx      Social History     Socioeconomic History    Marital status:      Spouse name: Not on file    Number of children: Not on file    Years of education: Not on file    Highest education level: Bachelor's degree (e.g., BA, AB, BS)   Occupational History    Not on file   Tobacco Use    Smoking status: Never    Smokeless tobacco: Never   Vaping Use    Vaping Use: Never used   Substance and Sexual Activity    Alcohol use: Not Currently     Comment: rare    Drug use: No    Sexual activity: Yes     Partners: Female     Comment: ,  [employer]   Other Topics Concern     Service No    Blood Transfusions No    Caffeine Concern No    Occupational Exposure No    Hobby Hazards No    Sleep Concern Yes    Stress Concern No    Weight Concern No    Special Diet No    Back Care No    Exercise Yes    Bike Helmet No    Seat Belt Yes    Self-Exams Yes   Social History Narrative    Not on file      Social Determinants of Health     Financial Resource Strain: Low Risk  (11/17/2023)    Overall Financial Resource Strain (CARDIA)     Difficulty of Paying Living Expenses: Not hard at all   Food Insecurity: No Food Insecurity (11/17/2023)    Hunger Vital Sign     Worried About Running Out of Food in the Last Year: Never true     Ran Out of Food in the Last Year: Never true   Transportation Needs: No Transportation Needs (11/17/2023)    PRAPARE - Transportation     Lack of Transportation (Medical): No     Lack of Transportation (Non-Medical): No   Physical Activity: Sufficiently Active (11/17/2023)    Exercise Vital Sign     Days of Exercise per Week: 6 days     Minutes of Exercise per Session: 50 min   Stress: Stress Concern Present (11/17/2023)    [place] [place] [employer] - Occupational Stress Questionnaire     Feeling of Stress : To some extent   Social Connections: Moderately Isolated (11/17/2023)    Social Connection and Isolation Panel [NHANES]     Frequency of Communication with Friends and Family: Three times a week     Frequency of Social Gatherings with Friends and Family: Once a week     Attends Church Services: Never     Active Member of Clubs or Organizations: No     Attends Club or Organization Meetings: Never     Marital Status:    Intimate Partner Violence: Not on file   Housing Stability: Low Risk  (11/17/2023)    Housing Stability Vital Sign     Unable to Pay for Housing in the Last Year: No     Number of Places Lived in the Last Year: 1     Unstable Housing in the Last Year: No     No Known Allergies  Outpatient Encounter Medications as of 4/12/2024   Medication Sig Dispense Refill    coenzyme Q-10 30 MG capsule Take 30 mg by mouth every day.      metoprolol tartrate (LOPRESSOR) 25 MG Tab Take 1 Tablet by mouth 2 times a day. 180 Tablet 3    losartan (COZAAR) 25 MG Tab Take losartan 50mg in the morning and 25mg at night 135 Tablet 3    atorvastatin (LIPITOR) 20 MG Tab  "TAKE 1 TABLET BY MOUTH EVERY DAY 90 Tablet 3    ketoconazole (NIZORAL) 2 % Cream APPLY THIN FILM TO SCALY RASH ON FACE/EARS TWICE DAILY X 4 WEEKS. REPEAT FOR FLARES      tamsulosin (FLOMAX) 0.4 MG capsule Take 1 Capsule by mouth 1/2 hour after breakfast. 90 Capsule 2    brimonidine (ALPHAGAN) 0.2 % Solution Administer 1 Drop into the right eye 2 times a day.      aspirin 81 MG EC tablet Take 81 mg by mouth 1 time a day as needed (Chest Pains).      therapeutic multivitamin-minerals (THERAGRAN-M) Tab Take 1 Tablet by mouth every day.      latanoprost (XALATAN) 0.005 % Solution Administer 1 Drop into the right eye every evening.      Turmeric (QC TUMERIC COMPLEX PO) Take 1 Capsule by mouth every day. (Patient not taking: Reported on 4/12/2024)       No facility-administered encounter medications on file as of 4/12/2024.     ROS           Objective     /68 (BP Location: Left arm, Patient Position: Sitting, BP Cuff Size: Adult)   Pulse 61   Resp 14   Ht 1.93 m (6' 4\")   Wt 107 kg (235 lb 12.8 oz)   SpO2 95%   BMI 28.70 kg/m²     Physical Exam           Assessment & Plan     No diagnosis found.    Medical Decision Making: Today's Assessment/Status/Plan:        Hypertension  Syncope  Fatigue  -Recent hospital visit for hypertensive urgency in November.  Blood pressure is now well-controlled after medication changes.  -Encouraged him to continue to monitor his blood pressure daily at home.  -He did experience a syncopal event on 12/4, but this was isolated and seemed to occur with taking trazodone. He has not had a recurrence.   - Discussed changing metoprolol to XL to see if it helps his fatigue, but since his blood pressure is just barely under control, he would like to hold off for now which is reasonable.   -Continue metoprolol 25 mg twice daily and losartan 50mg in the morning and 25mg at night.      Ascending aortic aneurysm  Chest pain  -4.5 without thrombus on recent CTA   -Repeat CTA yearly to monitor " for progression.  Next 11/2024  -Continue to encourage tight blood pressure control.  -Chest pain is very atypical and NM stress testing was positive for infarct without reversible ischemia. Discussed considering the addition of nitro as needed. He would like to hold off at this time.   - Discussed ED precautions.      Hyperlipidemia  -LDL 53 on recent labs  -Continue atorvastatin 20 mg daily     Insomnia  -He has tried a myriad of medications without positive results.  - He tried trazodone and had a syncopal event so he would like to avoid this.   -Referral to sleep medicine placed at last exam. He is scheduled in July for this.                    oriented to person, place, and time.   Psychiatric:         Mood and Affect: Mood normal.         Behavior: Behavior normal.         Judgment: Judgment normal.       Lab Results   Component Value Date/Time    CHOLSTRLTOT 102 11/17/2023 06:41 AM    LDL 53 11/17/2023 06:41 AM    HDL 36 (A) 11/17/2023 06:41 AM    TRIGLYCERIDE 64 11/17/2023 06:41 AM       Lab Results   Component Value Date/Time    SODIUM 139 11/17/2023 06:41 AM    POTASSIUM 4.1 11/17/2023 06:41 AM    CHLORIDE 104 11/17/2023 06:41 AM    CO2 26 11/17/2023 06:41 AM    GLUCOSE 100 (H) 11/17/2023 06:41 AM    BUN 12 11/17/2023 06:41 AM    CREATININE 0.94 11/17/2023 06:41 AM    CREATININE 1.2 09/03/2008 03:38 PM    BUNCREATRAT 10 08/02/2019 06:06 AM     Lab Results   Component Value Date/Time    ALKPHOSPHAT 61 11/16/2023 07:32 AM    ASTSGOT 23 11/16/2023 07:32 AM    ALTSGPT 18 11/16/2023 07:32 AM    TBILIRUBIN 1.1 11/16/2023 07:32 AM       Cardiovascular imaging and procedures:     Echocardiogram 10/31/2023  CONCLUSIONS  Compared to the prior study on 12/27/22, no changes.  Normal left ventricular systolic function.   No evidence of valvular abnormality based on Doppler evaluation.   Normal aortic root for body surface area.the ascending aorta is dilated   with a diameter of  4.3cm.     Echocardiogram 12/27/2022  CONCLUSIONS  Compared to the images of the prior study 10/01/2021,  no changes.  Normal left ventricular systolic function.   Mild mitral regurgitation.  Aortic root dilatation 4.0 cm. The ascending aorta is aneurysmal with a   diameter of 4.4 cm.  Right ventricular systolic pressure is estimated to be 38 mmHg.     NM stress testing 11/17/2023  Myocardial Perfusion   Report   NUCLEAR IMAGING INTERPRETATION   Small nonreversible defect in the apical segment suggestive of infarct. No    evidence of reversible perfusion defect to suggest ischemia. Left ventricular   ejection fraction measures 50%.   ECG INTERPRETATION   Nondiagnostic due to pharmacologic  stress test.         Assessment & Plan     1. Primary hypertension  losartan (COZAAR) 25 MG Tab    Comp Metabolic Panel      2. Ascending aorta dilatation (HCC)  CT-CTA CHEST WITH & W/O-POST PROCESS      3. Dyslipidemia  Lipid Profile      4. Syncope and collapse        5. Insomnia, unspecified type        6. Other chest pain        7. Chronic fatigue            Medical Decision Making: Today's Assessment/Status/Plan:        Hypertension  -Blood pressure is well controlled.   -Encouraged him to continue to monitor his blood pressure daily at home.  -Continue metoprolol 25 mg twice daily and losartan 25mg daily.      Ascending aortic aneurysm  Chest pain  - No further chest pain.   -4.5 without thrombus on most recent CTA   -Repeat CTA yearly in October/November to monitor for progression.   -Continue to encourage tight blood pressure control.  - Discussed ED precautions.      Hyperlipidemia  -LDL 53 on labs   - Repeat lipid panel prior to next visit.   -Continue atorvastatin 20 mg daily     Insomnia  -He has tried a myriad of medications without positive results.  - He tried trazodone and had a syncopal event so he would like to avoid this.   -Referral to sleep medicine placed previously. He is scheduled in July for this.       Follow up in 6 months or sooner with clinical changes.    Encouraged him to reach out via MyChart or telephone with any questions or concerns.    Thank you for allowing me to participate in the care of Tyler Live .    Brisa Coates PA-C, Cardiology  [facility], [facility]  [address] [address]  Phone: [phone]  Fax: [phone]    PLEASE NOTE: This note was created using voice recognition software. I have made every reasonable attempt to correct obvious errors, but I expect that there are errors of grammar and possibly content that I did not discover before finalizing the note.

## 2024-05-02 ENCOUNTER — APPOINTMENT (OUTPATIENT)
Dept: MEDICAL GROUP | Facility: MEDICAL CENTER | Age: 69
End: 2024-05-02
Payer: MEDICARE

## 2024-05-03 ENCOUNTER — OFFICE VISIT (OUTPATIENT)
Dept: MEDICAL GROUP | Facility: MEDICAL CENTER | Age: 69
End: 2024-05-03
Payer: MEDICARE

## 2024-05-03 VITALS
RESPIRATION RATE: 16 BRPM | TEMPERATURE: 97.5 F | HEIGHT: 76 IN | WEIGHT: 234 LBS | DIASTOLIC BLOOD PRESSURE: 62 MMHG | HEART RATE: 60 BPM | BODY MASS INDEX: 28.49 KG/M2 | SYSTOLIC BLOOD PRESSURE: 116 MMHG | OXYGEN SATURATION: 97 %

## 2024-05-03 DIAGNOSIS — I77.810 ASCENDING AORTA DILATATION (HCC): ICD-10-CM

## 2024-05-03 DIAGNOSIS — R20.0 NUMBNESS OF TOES: ICD-10-CM

## 2024-05-03 DIAGNOSIS — R39.14 BENIGN PROSTATIC HYPERPLASIA WITH INCOMPLETE BLADDER EMPTYING: ICD-10-CM

## 2024-05-03 DIAGNOSIS — N40.1 BENIGN PROSTATIC HYPERPLASIA WITH INCOMPLETE BLADDER EMPTYING: ICD-10-CM

## 2024-05-03 DIAGNOSIS — Z23 NEED FOR VACCINATION: ICD-10-CM

## 2024-05-03 DIAGNOSIS — I10 PRIMARY HYPERTENSION: ICD-10-CM

## 2024-05-03 DIAGNOSIS — E78.2 MIXED HYPERLIPIDEMIA: ICD-10-CM

## 2024-05-03 PROCEDURE — 3078F DIAST BP <80 MM HG: CPT | Performed by: FAMILY MEDICINE

## 2024-05-03 PROCEDURE — 99214 OFFICE O/P EST MOD 30 MIN: CPT | Performed by: FAMILY MEDICINE

## 2024-05-03 PROCEDURE — 3074F SYST BP LT 130 MM HG: CPT | Performed by: FAMILY MEDICINE

## 2024-05-03 ASSESSMENT — ENCOUNTER SYMPTOMS
PND: 0
DOUBLE VISION: 0
GASTROINTESTINAL NEGATIVE: 1
MUSCULOSKELETAL NEGATIVE: 1
COUGH: 0
SHORTNESS OF BREATH: 0
CHILLS: 0
LOSS OF CONSCIOUSNESS: 0
NAUSEA: 0
INSOMNIA: 1
FEVER: 0
EYES NEGATIVE: 1
CLAUDICATION: 0
PALPITATIONS: 0
BLURRED VISION: 0
BRUISES/BLEEDS EASILY: 0
WEAKNESS: 0
ABDOMINAL PAIN: 0
DIZZINESS: 0
HEADACHES: 0
CONSTITUTIONAL NEGATIVE: 1
NEUROLOGICAL NEGATIVE: 1
ORTHOPNEA: 0
VOMITING: 0
MYALGIAS: 0

## 2024-05-03 ASSESSMENT — PATIENT HEALTH QUESTIONNAIRE - PHQ9: CLINICAL INTERPRETATION OF PHQ2 SCORE: 0

## 2024-05-03 ASSESSMENT — FIBROSIS 4 INDEX: FIB4 SCORE: 2.11

## 2024-05-03 NOTE — PROGRESS NOTES
CC: Hypertension, hyperlipidemia, benign prostatic hypertrophy, ascending aortic dilation, numbness and burning sensation of second and third toes of right foot    HPI:   Tyler presents today to discuss the following:    Primary hypertension  Blood pressure has been adequately controlled on losartan 25 mg daily, metoprolol 25 mg twice a day.  No side effects      Mixed hyperlipidemia  He has been tolerating the statin. Denies muscle pain LFTs has been normal, patient is currently on atorvastatin 20 mg daily.    Benign prostatic hyperplasia with incomplete bladder emptying  Urinary symptoms has improved.  Patient is currently on tamsulosin 0.4 mg daily.  No side effects    Ascending aorta dilatation (HCC)  Denies any chest pain or shortness of breath.Last CT scan of the thoracic aorta done in November 2023 showed: Stable ectasia of the ascending aorta measuring 4.5 cm   Continue metoprolol 5 mg twice a day.  Patient is scheduled for another CT scan ordered by the cardiologist.    Numbness of toes  Patient has been having burning sensation and numbness affecting the second third toes of the right foot.  Condition has been there for a while but lately it has worsened.  Denies any trauma to the foot.  Has been using orthotics in his shoes all the time.         Patient Active Problem List    Diagnosis Date Noted    Primary hypertension 12/26/2023    Mixed hyperlipidemia 12/26/2023    Hypertensive urgency 11/16/2023    Chest pain 11/16/2023    Headache 11/16/2023    Benign prostatic hyperplasia with incomplete bladder emptying 04/03/2023    Arachnoid cyst of posterior cranial fossa 04/03/2023    Primary open angle glaucoma (POAG) of both eyes 04/03/2023    Primary osteoarthritis of left hip 10/20/2021    Cervical radiculopathy 06/05/2020    Gastroesophageal reflux disease without esophagitis 06/05/2020    Adenomatous polyp of sigmoid colon 06/05/2020    Dyslipidemia 08/01/2019    Ascending aorta dilatation (HCC)  "08/08/2017    Meniere disease 07/27/2017    Insomnia due to medical condition 02/17/2015    Eczema 02/17/2015       Current Outpatient Medications   Medication Sig Dispense Refill    coenzyme Q-10 30 MG capsule Take 30 mg by mouth every day.      losartan (COZAAR) 25 MG Tab Take 1 Tablet by mouth every day. 90 Tablet 3    metoprolol tartrate (LOPRESSOR) 25 MG Tab Take 1 Tablet by mouth 2 times a day. 180 Tablet 3    atorvastatin (LIPITOR) 20 MG Tab TAKE 1 TABLET BY MOUTH EVERY DAY 90 Tablet 3    ketoconazole (NIZORAL) 2 % Cream APPLY THIN FILM TO SCALY RASH ON FACE/EARS TWICE DAILY X 4 WEEKS. REPEAT FOR FLARES      tamsulosin (FLOMAX) 0.4 MG capsule Take 1 Capsule by mouth 1/2 hour after breakfast. 90 Capsule 2    brimonidine (ALPHAGAN) 0.2 % Solution Administer 1 Drop into the right eye 2 times a day.      aspirin 81 MG EC tablet Take 81 mg by mouth 1 time a day as needed (Chest Pains).      therapeutic multivitamin-minerals (THERAGRAN-M) Tab Take 1 Tablet by mouth every day.      latanoprost (XALATAN) 0.005 % Solution Administer 1 Drop into the right eye every evening.      Turmeric (QC TUMERIC COMPLEX PO) Take 1 Capsule by mouth every day. (Patient not taking: Reported on 5/3/2024)       No current facility-administered medications for this visit.         Allergies as of 05/03/2024    (No Known Allergies)        ROS: Denies any chest pain, Shortness of breath, Changes bowel or bladder, Lower extremity edema.    Physical Exam:  /62   Pulse 60   Temp 36.4 °C (97.5 °F) (Temporal)   Resp 16   Ht 1.93 m (6' 4\")   Wt 106 kg (234 lb)   SpO2 97%   BMI 28.48 kg/m²   Gen.: Well-developed, well-nourished, no apparent distress,pleasant and cooperative with the examination  Skin:  Warm and dry with good turgor. No rashes or suspicious lesions in visible areas  HEENT:Sinuses nontender with palpation, TMs clear, nares patent with pink mucosa and clear rhinorrhea,no septal deviation ,polyps or lesions. lips without " lesions, oropharynx clear.  Neck: Trachea midline,no masses or adenopathy. No JVD.  Cor: Regular rate and rhythm without murmur, gallop or rub.  Lungs: Respirations unlabored.Clear to auscultation with equal breath sounds bilaterally. No wheezes, rhonchi.  Extremities: No cyanosis, clubbing or edema.  Decreased sensation on the second and the third toes of the right foot          Assessment and Plan.   68 y.o. male     1. Primary hypertension  Controlled.    Continue on losartan 25 mg daily, metoprolol 25 mg twice a day.      2. Mixed hyperlipidemia  He has been tolerating the statin. Denies muscle pain LFTs has been normal  Continue on atorvastatin 20 mg daily.    3. Benign prostatic hyperplasia with incomplete bladder emptying  Has been doing fine on tamsulosin 0.4 mg daily.    4. Ascending aorta dilatation (HCC)  Stable patient has been asymptomatic.    Last CT scan of the thoracic aorta showed: 1. Stable ectasia of the ascending aorta measuring 4.5 cm   Continue metoprolol 5 mg twice a day.  Continue follow-up with cardiology.    5. Numbness of toes  Affecting the second third toes of the right foot.  Probably digital nerve compression.    Will send patient to be evaluated by podiatrist.    - Referral to Podiatry

## 2024-06-04 ENCOUNTER — APPOINTMENT (RX ONLY)
Dept: URBAN - METROPOLITAN AREA CLINIC 20 | Facility: CLINIC | Age: 69
Setting detail: DERMATOLOGY
End: 2024-06-04

## 2024-06-04 DIAGNOSIS — Z85.828 PERSONAL HISTORY OF OTHER MALIGNANT NEOPLASM OF SKIN: ICD-10-CM

## 2024-06-04 DIAGNOSIS — L57.0 ACTINIC KERATOSIS: ICD-10-CM

## 2024-06-04 DIAGNOSIS — Z71.89 OTHER SPECIFIED COUNSELING: ICD-10-CM

## 2024-06-04 DIAGNOSIS — L81.4 OTHER MELANIN HYPERPIGMENTATION: ICD-10-CM

## 2024-06-04 DIAGNOSIS — D18.0 HEMANGIOMA: ICD-10-CM

## 2024-06-04 DIAGNOSIS — D22 MELANOCYTIC NEVI: ICD-10-CM

## 2024-06-04 PROBLEM — D22.61 MELANOCYTIC NEVI OF RIGHT UPPER LIMB, INCLUDING SHOULDER: Status: ACTIVE | Noted: 2024-06-04

## 2024-06-04 PROBLEM — D22.39 MELANOCYTIC NEVI OF OTHER PARTS OF FACE: Status: ACTIVE | Noted: 2024-06-04

## 2024-06-04 PROBLEM — D22.62 MELANOCYTIC NEVI OF LEFT UPPER LIMB, INCLUDING SHOULDER: Status: ACTIVE | Noted: 2024-06-04

## 2024-06-04 PROBLEM — D22.72 MELANOCYTIC NEVI OF LEFT LOWER LIMB, INCLUDING HIP: Status: ACTIVE | Noted: 2024-06-04

## 2024-06-04 PROBLEM — D22.5 MELANOCYTIC NEVI OF TRUNK: Status: ACTIVE | Noted: 2024-06-04

## 2024-06-04 PROBLEM — D22.71 MELANOCYTIC NEVI OF RIGHT LOWER LIMB, INCLUDING HIP: Status: ACTIVE | Noted: 2024-06-04

## 2024-06-04 PROBLEM — D18.01 HEMANGIOMA OF SKIN AND SUBCUTANEOUS TISSUE: Status: ACTIVE | Noted: 2024-06-04

## 2024-06-04 PROCEDURE — ? ADDITIONAL NOTES

## 2024-06-04 PROCEDURE — ? COUNSELING

## 2024-06-04 PROCEDURE — ? LIQUID NITROGEN

## 2024-06-04 PROCEDURE — ? OBSERVATION

## 2024-06-04 PROCEDURE — 17003 DESTRUCT PREMALG LES 2-14: CPT

## 2024-06-04 PROCEDURE — 17000 DESTRUCT PREMALG LESION: CPT

## 2024-06-04 PROCEDURE — 99213 OFFICE O/P EST LOW 20 MIN: CPT | Mod: 25

## 2024-06-04 PROCEDURE — ? SUNSCREEN RECOMMENDATIONS

## 2024-06-04 ASSESSMENT — LOCATION DETAILED DESCRIPTION DERM
LOCATION DETAILED: LEFT VENTRAL PROXIMAL FOREARM
LOCATION DETAILED: RIGHT PROXIMAL PRETIBIAL REGION
LOCATION DETAILED: LEFT LATERAL PROXIMAL PRETIBIAL REGION
LOCATION DETAILED: RIGHT VENTRAL PROXIMAL FOREARM
LOCATION DETAILED: RIGHT INFERIOR FOREHEAD
LOCATION DETAILED: RIGHT DISTAL POSTERIOR THIGH
LOCATION DETAILED: RIGHT INFERIOR CENTRAL MALAR CHEEK
LOCATION DETAILED: RIGHT SUPERIOR UPPER BACK
LOCATION DETAILED: LEFT SUPERIOR PARIETAL SCALP
LOCATION DETAILED: RIGHT DISTAL POSTERIOR UPPER ARM
LOCATION DETAILED: NASAL DORSUM
LOCATION DETAILED: LEFT SUPERIOR OCCIPITAL SCALP
LOCATION DETAILED: LEFT DISTAL POSTERIOR THIGH
LOCATION DETAILED: LEFT PROXIMAL POSTERIOR UPPER ARM
LOCATION DETAILED: RIGHT INFERIOR LATERAL FOREHEAD
LOCATION DETAILED: SUPERIOR THORACIC SPINE
LOCATION DETAILED: RIGHT INFERIOR MEDIAL MIDBACK
LOCATION DETAILED: RIGHT INFERIOR MEDIAL UPPER BACK

## 2024-06-04 ASSESSMENT — LOCATION SIMPLE DESCRIPTION DERM
LOCATION SIMPLE: LEFT OCCIPITAL SCALP
LOCATION SIMPLE: RIGHT CHEEK
LOCATION SIMPLE: RIGHT FOREARM
LOCATION SIMPLE: LEFT FOREARM
LOCATION SIMPLE: LEFT POSTERIOR THIGH
LOCATION SIMPLE: RIGHT UPPER BACK
LOCATION SIMPLE: RIGHT POSTERIOR THIGH
LOCATION SIMPLE: NOSE
LOCATION SIMPLE: RIGHT POSTERIOR UPPER ARM
LOCATION SIMPLE: RIGHT PRETIBIAL REGION
LOCATION SIMPLE: RIGHT FOREHEAD
LOCATION SIMPLE: LEFT PRETIBIAL REGION
LOCATION SIMPLE: SCALP
LOCATION SIMPLE: LEFT POSTERIOR UPPER ARM
LOCATION SIMPLE: UPPER BACK
LOCATION SIMPLE: RIGHT LOWER BACK

## 2024-06-04 ASSESSMENT — LOCATION ZONE DERM
LOCATION ZONE: LEG
LOCATION ZONE: SCALP
LOCATION ZONE: ARM
LOCATION ZONE: TRUNK
LOCATION ZONE: NOSE
LOCATION ZONE: FACE

## 2024-06-04 NOTE — PROCEDURE: LIQUID NITROGEN
Post-Care Instructions: I reviewed with the patient in detail post-care instructions. Patient is to wear sunprotection, and avoid picking at any of the treated lesions. Pt may apply Vaseline to crusted or scabbing areas.
Show Aperture Variable?: Yes
Render Note In Bullet Format When Appropriate: No
Duration Of Freeze Thaw-Cycle (Seconds): 10
Consent: The patient's consent was obtained including but not limited to risks of crusting, scabbing, blistering, scarring, darker or lighter pigmentary change, recurrence, incomplete removal and infection. RTC in 2 months if lesion(s) persistent.
Detail Level: Detailed
Number Of Freeze-Thaw Cycles: 2 freeze-thaw cycles

## 2024-06-04 NOTE — PROCEDURE: ADDITIONAL NOTES
Render Risk Assessment In Note?: no
Additional Notes: Pt given samples of heliocare.
Detail Level: Detailed

## 2024-06-18 DIAGNOSIS — N40.1 BENIGN PROSTATIC HYPERPLASIA WITH INCOMPLETE BLADDER EMPTYING: ICD-10-CM

## 2024-06-18 DIAGNOSIS — R39.14 BENIGN PROSTATIC HYPERPLASIA WITH INCOMPLETE BLADDER EMPTYING: ICD-10-CM

## 2024-06-18 RX ORDER — FINASTERIDE 5 MG/1
5 TABLET, FILM COATED ORAL DAILY
Qty: 90 TABLET | Refills: 1 | Status: SHIPPED | OUTPATIENT
Start: 2024-06-18

## 2024-06-25 ENCOUNTER — HOSPITAL ENCOUNTER (OUTPATIENT)
Dept: RADIOLOGY | Facility: MEDICAL CENTER | Age: 69
End: 2024-06-25
Attending: PHYSICIAN ASSISTANT
Payer: MEDICARE

## 2024-06-25 DIAGNOSIS — I77.810 ASCENDING AORTA DILATATION (HCC): ICD-10-CM

## 2024-06-25 PROCEDURE — 71275 CT ANGIOGRAPHY CHEST: CPT

## 2024-06-25 PROCEDURE — 700117 HCHG RX CONTRAST REV CODE 255: Performed by: PHYSICIAN ASSISTANT

## 2024-06-25 RX ADMIN — IOHEXOL 100 ML: 350 INJECTION, SOLUTION INTRAVENOUS at 09:12

## 2024-07-31 ENCOUNTER — OFFICE VISIT (OUTPATIENT)
Dept: URGENT CARE | Facility: CLINIC | Age: 69
End: 2024-07-31
Payer: MEDICARE

## 2024-07-31 VITALS
WEIGHT: 237.6 LBS | OXYGEN SATURATION: 96 % | BODY MASS INDEX: 28.93 KG/M2 | SYSTOLIC BLOOD PRESSURE: 118 MMHG | HEIGHT: 76 IN | HEART RATE: 61 BPM | RESPIRATION RATE: 14 BRPM | TEMPERATURE: 97.6 F | DIASTOLIC BLOOD PRESSURE: 70 MMHG

## 2024-07-31 DIAGNOSIS — J22 LOWER RESPIRATORY TRACT INFECTION: ICD-10-CM

## 2024-07-31 DIAGNOSIS — R05.1 ACUTE COUGH: ICD-10-CM

## 2024-07-31 RX ORDER — METHYLPREDNISOLONE 4 MG/1
TABLET ORAL
Qty: 21 TABLET | Refills: 0 | Status: SHIPPED | OUTPATIENT
Start: 2024-07-31

## 2024-07-31 RX ORDER — AZITHROMYCIN 250 MG/1
TABLET, FILM COATED ORAL
Qty: 6 TABLET | Refills: 0 | Status: SHIPPED | OUTPATIENT
Start: 2024-07-31

## 2024-07-31 RX ORDER — BENZONATATE 100 MG/1
100 CAPSULE ORAL 3 TIMES DAILY PRN
Qty: 21 CAPSULE | Refills: 0 | Status: SHIPPED | OUTPATIENT
Start: 2024-07-31

## 2024-07-31 ASSESSMENT — FIBROSIS 4 INDEX: FIB4 SCORE: 2.12

## 2024-08-28 ENCOUNTER — APPOINTMENT (OUTPATIENT)
Dept: MEDICAL GROUP | Facility: MEDICAL CENTER | Age: 69
End: 2024-08-28
Payer: MEDICARE

## 2024-08-28 VITALS
HEIGHT: 76 IN | OXYGEN SATURATION: 94 % | WEIGHT: 235.2 LBS | RESPIRATION RATE: 16 BRPM | TEMPERATURE: 97.2 F | HEART RATE: 62 BPM | DIASTOLIC BLOOD PRESSURE: 70 MMHG | SYSTOLIC BLOOD PRESSURE: 118 MMHG | BODY MASS INDEX: 28.64 KG/M2

## 2024-08-28 DIAGNOSIS — R93.89 ABNORMAL CT OF THE CHEST: ICD-10-CM

## 2024-08-28 DIAGNOSIS — E78.2 MIXED HYPERLIPIDEMIA: ICD-10-CM

## 2024-08-28 DIAGNOSIS — N40.0 BENIGN PROSTATIC HYPERPLASIA WITHOUT LOWER URINARY TRACT SYMPTOMS: ICD-10-CM

## 2024-08-28 DIAGNOSIS — I10 PRIMARY HYPERTENSION: ICD-10-CM

## 2024-08-28 PROCEDURE — 99214 OFFICE O/P EST MOD 30 MIN: CPT | Performed by: FAMILY MEDICINE

## 2024-08-28 RX ORDER — TAMSULOSIN HYDROCHLORIDE 0.4 MG/1
0.4 CAPSULE ORAL
Qty: 90 CAPSULE | Refills: 2 | Status: SHIPPED | OUTPATIENT
Start: 2024-08-28

## 2024-08-28 RX ORDER — TAMSULOSIN HYDROCHLORIDE 0.4 MG/1
0.4 CAPSULE ORAL
COMMUNITY

## 2024-08-28 ASSESSMENT — FIBROSIS 4 INDEX: FIB4 SCORE: 2.12

## 2024-08-28 NOTE — PROGRESS NOTES
CC: Hypertension, hyperlipidemia, BPH, abnormal chest CT/intermittent cough    HPI:   Tyler presents today to discuss the following:    Primary hypertension  Blood pressure has been adequately controlled on metoprolol 25 mg twice a day, losartan 25 mg daily    Mixed hyperlipidemia  He has been tolerating the statin. Denies muscle pain LFTs has been normal, patient is currently on atorvastatin 20 mg daily    Benign prostatic hyperplasia without lower urinary tract symptoms  Patient's urinary symptoms(urinary frequency, urgency, incomplete bladder emptying) has been stable most of the time.  Patient is currently on Flomax 0.4 mg, he takes it every other day.  Needs medication refill    Abnormal CT of the chest  Patient had chest CT to follow-up his ascending aortic aneurysm, accidental finding was tree-in-bud.  Patient stated that he has been having intermittent cough/on and off.  But generally he is asymptomatic.  Currently denies any shortness of breath, fever, gets a cough fits once in a while.  Oxygen saturation is normal in room air    Patient Active Problem List    Diagnosis Date Noted    Primary hypertension 12/26/2023    Mixed hyperlipidemia 12/26/2023    Hypertensive urgency 11/16/2023    Chest pain 11/16/2023    Headache 11/16/2023    Benign prostatic hyperplasia with incomplete bladder emptying 04/03/2023    Arachnoid cyst of posterior cranial fossa 04/03/2023    Primary open angle glaucoma (POAG) of both eyes 04/03/2023    Primary osteoarthritis of left hip 10/20/2021    Cervical radiculopathy 06/05/2020    Gastroesophageal reflux disease without esophagitis 06/05/2020    Adenomatous polyp of sigmoid colon 06/05/2020    Dyslipidemia 08/01/2019    Ascending aorta dilatation (HCC) 08/08/2017    Meniere disease 07/27/2017    Insomnia due to medical condition 02/17/2015    Eczema 02/17/2015       Current Outpatient Medications   Medication Sig Dispense Refill    tamsulosin (FLOMAX) 0.4 MG capsule Take 0.4 mg  "by mouth 1/2 hour after breakfast.      azithromycin (ZITHROMAX) 250 MG Tab Take 2 tabs by mouth on day 1, then take 1 tab daily for the next 4 days. 6 Tablet 0    benzonatate (TESSALON) 100 MG Cap Take 1 Capsule by mouth 3 times a day as needed for Cough. 21 Capsule 0    methylPREDNISolone (MEDROL DOSEPAK) 4 MG Tablet Therapy Pack Follow schedule on package instructions. 21 Tablet 0    coenzyme Q-10 30 MG capsule Take 30 mg by mouth every day.      losartan (COZAAR) 25 MG Tab Take 1 Tablet by mouth every day. 90 Tablet 3    metoprolol tartrate (LOPRESSOR) 25 MG Tab Take 1 Tablet by mouth 2 times a day. 180 Tablet 3    atorvastatin (LIPITOR) 20 MG Tab TAKE 1 TABLET BY MOUTH EVERY DAY 90 Tablet 3    ketoconazole (NIZORAL) 2 % Cream APPLY THIN FILM TO SCALY RASH ON FACE/EARS TWICE DAILY X 4 WEEKS. REPEAT FOR FLARES      brimonidine (ALPHAGAN) 0.2 % Solution Administer 1 Drop into the right eye 2 times a day.      aspirin 81 MG EC tablet Take 81 mg by mouth 1 time a day as needed (Chest Pains).      therapeutic multivitamin-minerals (THERAGRAN-M) Tab Take 1 Tablet by mouth every day.      Turmeric (QC TUMERIC COMPLEX PO) Take 1 Capsule by mouth every day. (Patient not taking: Reported on 5/3/2024)      latanoprost (XALATAN) 0.005 % Solution Administer 1 Drop into the right eye every evening.       No current facility-administered medications for this visit.         Allergies as of 08/28/2024    (No Known Allergies)        ROS: Denies any chest pain, Shortness of breath, Changes bowel or bladder, Lower extremity edema.    Physical Exam:    /70   Pulse 62   Temp 36.2 °C (97.2 °F)   Resp 16   Ht 1.93 m (6' 4\")   Wt 107 kg (235 lb 3.2 oz)   SpO2 94%   BMI 28.63 kg/m²   Gen.: Well-developed, well-nourished, no apparent distress,pleasant and cooperative with the examination  Skin:  Warm and dry with good turgor. No rashes or suspicious lesions in visible areas  HEENT:Sinuses nontender with palpation, TMs clear, " nares patent with pink mucosa and clear rhinorrhea,no septal deviation ,polyps or lesions. lips without lesions, oropharynx clear.  Neck: Trachea midline,no masses or adenopathy. No JVD.  Cor: Regular rate and rhythm without murmur, gallop or rub.  Lungs: Respirations unlabored.Clear to auscultation with equal breath sounds bilaterally. No wheezes, rhonchi.  Extremities: No cyanosis, clubbing or edema.        Assessment and Plan.   69 y.o. male     1. Primary hypertension  Controlled.  Continue on metoprolol 25 mg twice a day, losartan 25 mg daily    2. Mixed hyperlipidemia  He has been tolerating the statin. Denies muscle pain LFTs has been normal  Continue atorvastatin 20 mg daily    3. Benign prostatic hyperplasia without lower urinary tract symptoms  .  Symptoms has been stable.  Continue on Flomax 0.4 mg, needs medication refill    4. Abnormal CT of the chest  Patient had chest CT to follow-up his aortic aneurysm, accidental finding was tree-in-bud.  Patient stated that he has been having cough on and off.  But generally he is asymptomatic.  Will refer patient to pulmonology    - Referral to Pulmonary and Sleep Medicine

## 2024-10-03 ENCOUNTER — OFFICE VISIT (OUTPATIENT)
Dept: CARDIOLOGY | Facility: MEDICAL CENTER | Age: 69
End: 2024-10-03
Attending: PHYSICIAN ASSISTANT
Payer: MEDICARE

## 2024-10-03 VITALS
WEIGHT: 237.6 LBS | OXYGEN SATURATION: 95 % | RESPIRATION RATE: 16 BRPM | SYSTOLIC BLOOD PRESSURE: 112 MMHG | HEIGHT: 76 IN | HEART RATE: 54 BPM | BODY MASS INDEX: 28.93 KG/M2 | DIASTOLIC BLOOD PRESSURE: 62 MMHG

## 2024-10-03 DIAGNOSIS — R53.82 CHRONIC FATIGUE: ICD-10-CM

## 2024-10-03 DIAGNOSIS — H81.09 MENIERE'S DISEASE, UNSPECIFIED LATERALITY: ICD-10-CM

## 2024-10-03 DIAGNOSIS — I77.810 ASCENDING AORTA DILATATION (HCC): ICD-10-CM

## 2024-10-03 DIAGNOSIS — E78.5 DYSLIPIDEMIA: ICD-10-CM

## 2024-10-03 DIAGNOSIS — R42 DIZZINESS: ICD-10-CM

## 2024-10-03 DIAGNOSIS — R55 SYNCOPE AND COLLAPSE: ICD-10-CM

## 2024-10-03 DIAGNOSIS — G47.00 INSOMNIA, UNSPECIFIED TYPE: ICD-10-CM

## 2024-10-03 DIAGNOSIS — R42 LIGHTHEADEDNESS: ICD-10-CM

## 2024-10-03 DIAGNOSIS — I10 PRIMARY HYPERTENSION: ICD-10-CM

## 2024-10-03 DIAGNOSIS — R07.89 OTHER CHEST PAIN: ICD-10-CM

## 2024-10-03 PROCEDURE — 99213 OFFICE O/P EST LOW 20 MIN: CPT | Performed by: PHYSICIAN ASSISTANT

## 2024-10-03 PROCEDURE — 3078F DIAST BP <80 MM HG: CPT | Performed by: PHYSICIAN ASSISTANT

## 2024-10-03 PROCEDURE — 3074F SYST BP LT 130 MM HG: CPT | Performed by: PHYSICIAN ASSISTANT

## 2024-10-03 PROCEDURE — 99214 OFFICE O/P EST MOD 30 MIN: CPT | Performed by: PHYSICIAN ASSISTANT

## 2024-10-03 RX ORDER — METOPROLOL SUCCINATE 25 MG/1
25 TABLET, EXTENDED RELEASE ORAL DAILY
Qty: 90 TABLET | Refills: 3 | Status: SHIPPED | OUTPATIENT
Start: 2024-10-03

## 2024-10-03 ASSESSMENT — ENCOUNTER SYMPTOMS
FEVER: 0
BLURRED VISION: 0
ABDOMINAL PAIN: 0
PALPITATIONS: 0
EYES NEGATIVE: 1
MYALGIAS: 0
COUGH: 0
PND: 0
DIZZINESS: 1
ORTHOPNEA: 0
LOSS OF CONSCIOUSNESS: 1
BRUISES/BLEEDS EASILY: 0
WEAKNESS: 0
VOMITING: 0
DOUBLE VISION: 0
NAUSEA: 0
HEARTBURN: 1
SHORTNESS OF BREATH: 0
HEADACHES: 0
INSOMNIA: 1
CHILLS: 0
CLAUDICATION: 0
CONSTITUTIONAL NEGATIVE: 1
MUSCULOSKELETAL NEGATIVE: 1

## 2024-10-03 ASSESSMENT — FIBROSIS 4 INDEX: FIB4 SCORE: 2.12

## 2024-10-18 ENCOUNTER — RESEARCH ENCOUNTER (OUTPATIENT)
Dept: RESEARCH | Facility: MEDICAL CENTER | Age: 69
End: 2024-10-18
Payer: MEDICARE

## 2024-10-18 DIAGNOSIS — Z00.6 RESEARCH STUDY PATIENT: ICD-10-CM

## 2024-10-31 ENCOUNTER — HOSPITAL ENCOUNTER (OUTPATIENT)
Dept: LAB | Facility: MEDICAL CENTER | Age: 69
End: 2024-10-31
Attending: FAMILY MEDICINE
Payer: MEDICARE

## 2024-10-31 DIAGNOSIS — Z00.6 RESEARCH STUDY PATIENT: ICD-10-CM

## 2024-11-06 LAB
ELF SCORE: 10.71 -
HA (HYALURONIC ACID): 215.69 NG/ML
PIIINP (AMINO-TERMINAL PROPEPTIDE): 10.42 NG/ML
RELATIVE RISK: ABNORMAL
RISK GROUP: ABNORMAL
RISK: 23.6 %
TIMP-1 (TISSUE INHIBITOR OF MMP1): 205.4 NG/ML

## 2024-11-11 LAB
APOB+LDLR+PCSK9 GENE MUT ANL BLD/T: NOT DETECTED
BRCA1+BRCA2 DEL+DUP + FULL MUT ANL BLD/T: NOT DETECTED
MLH1+MSH2+MSH6+PMS2 GN DEL+DUP+FUL M: NOT DETECTED

## 2024-12-03 ENCOUNTER — APPOINTMENT (OUTPATIENT)
Dept: URBAN - METROPOLITAN AREA CLINIC 20 | Facility: CLINIC | Age: 69
Setting detail: DERMATOLOGY
End: 2024-12-03

## 2024-12-03 DIAGNOSIS — D18.0 HEMANGIOMA: ICD-10-CM

## 2024-12-03 DIAGNOSIS — L81.4 OTHER MELANIN HYPERPIGMENTATION: ICD-10-CM

## 2024-12-03 DIAGNOSIS — D22 MELANOCYTIC NEVI: ICD-10-CM

## 2024-12-03 DIAGNOSIS — Z85.828 PERSONAL HISTORY OF OTHER MALIGNANT NEOPLASM OF SKIN: ICD-10-CM

## 2024-12-03 DIAGNOSIS — L57.0 ACTINIC KERATOSIS: ICD-10-CM

## 2024-12-03 DIAGNOSIS — L82.1 OTHER SEBORRHEIC KERATOSIS: ICD-10-CM

## 2024-12-03 DIAGNOSIS — L82.0 INFLAMED SEBORRHEIC KERATOSIS: ICD-10-CM

## 2024-12-03 DIAGNOSIS — L21.8 OTHER SEBORRHEIC DERMATITIS: ICD-10-CM

## 2024-12-03 DIAGNOSIS — Z71.89 OTHER SPECIFIED COUNSELING: ICD-10-CM

## 2024-12-03 PROBLEM — D22.39 MELANOCYTIC NEVI OF OTHER PARTS OF FACE: Status: ACTIVE | Noted: 2024-12-03

## 2024-12-03 PROBLEM — D22.5 MELANOCYTIC NEVI OF TRUNK: Status: ACTIVE | Noted: 2024-12-03

## 2024-12-03 PROBLEM — D18.01 HEMANGIOMA OF SKIN AND SUBCUTANEOUS TISSUE: Status: ACTIVE | Noted: 2024-12-03

## 2024-12-03 PROBLEM — D22.71 MELANOCYTIC NEVI OF RIGHT LOWER LIMB, INCLUDING HIP: Status: ACTIVE | Noted: 2024-12-03

## 2024-12-03 PROBLEM — D22.62 MELANOCYTIC NEVI OF LEFT UPPER LIMB, INCLUDING SHOULDER: Status: ACTIVE | Noted: 2024-12-03

## 2024-12-03 PROBLEM — D22.61 MELANOCYTIC NEVI OF RIGHT UPPER LIMB, INCLUDING SHOULDER: Status: ACTIVE | Noted: 2024-12-03

## 2024-12-03 PROBLEM — D22.72 MELANOCYTIC NEVI OF LEFT LOWER LIMB, INCLUDING HIP: Status: ACTIVE | Noted: 2024-12-03

## 2024-12-03 PROCEDURE — 17110 DESTRUCTION B9 LES UP TO 14: CPT

## 2024-12-03 PROCEDURE — 17003 DESTRUCT PREMALG LES 2-14: CPT | Mod: 59

## 2024-12-03 PROCEDURE — ? MEDICATION COUNSELING

## 2024-12-03 PROCEDURE — ? SUNSCREEN RECOMMENDATIONS

## 2024-12-03 PROCEDURE — ? LIQUID NITROGEN

## 2024-12-03 PROCEDURE — ? COUNSELING

## 2024-12-03 PROCEDURE — 17000 DESTRUCT PREMALG LESION: CPT | Mod: 59

## 2024-12-03 PROCEDURE — ? OBSERVATION

## 2024-12-03 PROCEDURE — 99213 OFFICE O/P EST LOW 20 MIN: CPT | Mod: 25

## 2024-12-03 ASSESSMENT — LOCATION SIMPLE DESCRIPTION DERM
LOCATION SIMPLE: LEFT FOREARM
LOCATION SIMPLE: RIGHT UPPER BACK
LOCATION SIMPLE: CHEST
LOCATION SIMPLE: UPPER BACK
LOCATION SIMPLE: SCALP
LOCATION SIMPLE: RIGHT FOREARM
LOCATION SIMPLE: RIGHT PRETIBIAL REGION
LOCATION SIMPLE: RIGHT LOWER BACK
LOCATION SIMPLE: RIGHT EAR
LOCATION SIMPLE: LEFT TEMPLE
LOCATION SIMPLE: NOSE
LOCATION SIMPLE: RIGHT CHEEK
LOCATION SIMPLE: RIGHT POSTERIOR UPPER ARM
LOCATION SIMPLE: RIGHT FOREHEAD
LOCATION SIMPLE: POSTERIOR SCALP
LOCATION SIMPLE: LEFT POSTERIOR THIGH
LOCATION SIMPLE: LEFT POSTERIOR UPPER ARM
LOCATION SIMPLE: LEFT SCALP
LOCATION SIMPLE: ABDOMEN
LOCATION SIMPLE: RIGHT POSTERIOR THIGH
LOCATION SIMPLE: LEFT UPPER ARM
LOCATION SIMPLE: LEFT PRETIBIAL REGION

## 2024-12-03 ASSESSMENT — LOCATION DETAILED DESCRIPTION DERM
LOCATION DETAILED: LEFT VENTRAL PROXIMAL FOREARM
LOCATION DETAILED: RIGHT DISTAL POSTERIOR THIGH
LOCATION DETAILED: PERIUMBILICAL SKIN
LOCATION DETAILED: INFERIOR THORACIC SPINE
LOCATION DETAILED: NASAL DORSUM
LOCATION DETAILED: RIGHT TRAGUS
LOCATION DETAILED: LEFT MID TEMPLE
LOCATION DETAILED: RIGHT RIB CAGE
LOCATION DETAILED: LEFT INFERIOR TEMPLE
LOCATION DETAILED: RIGHT INFERIOR MEDIAL MIDBACK
LOCATION DETAILED: LEFT LATERAL PROXIMAL PRETIBIAL REGION
LOCATION DETAILED: LEFT MEDIAL INFERIOR CHEST
LOCATION DETAILED: RIGHT VENTRAL PROXIMAL FOREARM
LOCATION DETAILED: RIGHT SUPERIOR UPPER BACK
LOCATION DETAILED: RIGHT INFERIOR FOREHEAD
LOCATION DETAILED: LEFT CENTRAL FRONTAL SCALP
LOCATION DETAILED: MID-OCCIPITAL SCALP
LOCATION DETAILED: RIGHT DISTAL POSTERIOR UPPER ARM
LOCATION DETAILED: LEFT ANTERIOR LATERAL PROXIMAL UPPER ARM
LOCATION DETAILED: LEFT SUPERIOR PARIETAL SCALP
LOCATION DETAILED: LEFT DISTAL POSTERIOR THIGH
LOCATION DETAILED: LEFT PROXIMAL POSTERIOR UPPER ARM
LOCATION DETAILED: RIGHT INFERIOR MEDIAL UPPER BACK
LOCATION DETAILED: RIGHT INFERIOR CENTRAL MALAR CHEEK
LOCATION DETAILED: POSTERIOR MID-PARIETAL SCALP
LOCATION DETAILED: SUPERIOR THORACIC SPINE
LOCATION DETAILED: RIGHT PROXIMAL PRETIBIAL REGION

## 2024-12-03 ASSESSMENT — LOCATION ZONE DERM
LOCATION ZONE: TRUNK
LOCATION ZONE: SCALP
LOCATION ZONE: FACE
LOCATION ZONE: LEG
LOCATION ZONE: NOSE
LOCATION ZONE: EAR
LOCATION ZONE: ARM

## 2024-12-03 NOTE — PROCEDURE: LIQUID NITROGEN
Spray Paint Text: The liquid nitrogen was applied to the skin utilizing a spray paint frosting technique.
Show Spray Paint Technique Variable?: Yes
Duration Of Freeze Thaw-Cycle (Seconds): 5-10
Post-Care Instructions: I reviewed with the patient in detail post-care instructions. Patient is to wear sunprotection, and avoid picking at any of the treated lesions. Pt may apply Vaseline to crusted or scabbing areas.
Consent: The patient's consent was obtained including but not limited to risks of crusting, scabbing, blistering, scarring, darker or lighter pigmentary change, recurrence, incomplete removal and infection.
Medical Necessity Information: It is in your best interest to select a reason for this procedure from the list below. All of these items fulfill various CMS LCD requirements except the new and changing color options.
Add 52 Modifier (Optional): no
Number Of Freeze-Thaw Cycles: 2 freeze-thaw cycles
Detail Level: Detailed
Medical Necessity Clause: This procedure was medically necessary because the lesions that were treated were:
Consent: The patient's consent was obtained including but not limited to risks of crusting, scabbing, blistering, scarring, darker or lighter pigmentary change, recurrence, incomplete removal and infection. RTC in 2 months if lesion(s) persistent.
Duration Of Freeze Thaw-Cycle (Seconds): 10

## 2024-12-08 SDOH — HEALTH STABILITY: PHYSICAL HEALTH: ON AVERAGE, HOW MANY MINUTES DO YOU ENGAGE IN EXERCISE AT THIS LEVEL?: 60 MIN

## 2024-12-08 SDOH — ECONOMIC STABILITY: FOOD INSECURITY: WITHIN THE PAST 12 MONTHS, YOU WORRIED THAT YOUR FOOD WOULD RUN OUT BEFORE YOU GOT MONEY TO BUY MORE.: NEVER TRUE

## 2024-12-08 SDOH — ECONOMIC STABILITY: FOOD INSECURITY: WITHIN THE PAST 12 MONTHS, THE FOOD YOU BOUGHT JUST DIDN'T LAST AND YOU DIDN'T HAVE MONEY TO GET MORE.: NEVER TRUE

## 2024-12-08 SDOH — ECONOMIC STABILITY: INCOME INSECURITY: IN THE LAST 12 MONTHS, WAS THERE A TIME WHEN YOU WERE NOT ABLE TO PAY THE MORTGAGE OR RENT ON TIME?: NO

## 2024-12-08 SDOH — HEALTH STABILITY: PHYSICAL HEALTH: ON AVERAGE, HOW MANY DAYS PER WEEK DO YOU ENGAGE IN MODERATE TO STRENUOUS EXERCISE (LIKE A BRISK WALK)?: 5 DAYS

## 2024-12-08 SDOH — ECONOMIC STABILITY: INCOME INSECURITY: HOW HARD IS IT FOR YOU TO PAY FOR THE VERY BASICS LIKE FOOD, HOUSING, MEDICAL CARE, AND HEATING?: NOT HARD AT ALL

## 2024-12-08 SDOH — HEALTH STABILITY: MENTAL HEALTH
STRESS IS WHEN SOMEONE FEELS TENSE, NERVOUS, ANXIOUS, OR CAN'T SLEEP AT NIGHT BECAUSE THEIR MIND IS TROUBLED. HOW STRESSED ARE YOU?: RATHER MUCH

## 2024-12-08 ASSESSMENT — SOCIAL DETERMINANTS OF HEALTH (SDOH)
HOW HARD IS IT FOR YOU TO PAY FOR THE VERY BASICS LIKE FOOD, HOUSING, MEDICAL CARE, AND HEATING?: NOT HARD AT ALL
IN THE PAST 12 MONTHS, HAS THE ELECTRIC, GAS, OIL, OR WATER COMPANY THREATENED TO SHUT OFF SERVICE IN YOUR HOME?: NO
HOW OFTEN DO YOU ATTEND CHURCH OR RELIGIOUS SERVICES?: NEVER
HOW OFTEN DO YOU GET TOGETHER WITH FRIENDS OR RELATIVES?: ONCE A WEEK
HOW OFTEN DO YOU GET TOGETHER WITH FRIENDS OR RELATIVES?: ONCE A WEEK
HOW OFTEN DO YOU ATTENT MEETINGS OF THE CLUB OR ORGANIZATION YOU BELONG TO?: NEVER
WITHIN THE PAST 12 MONTHS, YOU WORRIED THAT YOUR FOOD WOULD RUN OUT BEFORE YOU GOT THE MONEY TO BUY MORE: NEVER TRUE
IN A TYPICAL WEEK, HOW MANY TIMES DO YOU TALK ON THE PHONE WITH FAMILY, FRIENDS, OR NEIGHBORS?: TWICE A WEEK
DO YOU BELONG TO ANY CLUBS OR ORGANIZATIONS SUCH AS CHURCH GROUPS UNIONS, FRATERNAL OR ATHLETIC GROUPS, OR SCHOOL GROUPS?: NO
HOW OFTEN DO YOU ATTENT MEETINGS OF THE CLUB OR ORGANIZATION YOU BELONG TO?: NEVER
HOW OFTEN DO YOU ATTEND CHURCH OR RELIGIOUS SERVICES?: NEVER
HOW MANY DRINKS CONTAINING ALCOHOL DO YOU HAVE ON A TYPICAL DAY WHEN YOU ARE DRINKING: 1 OR 2
HOW OFTEN DO YOU HAVE SIX OR MORE DRINKS ON ONE OCCASION: NEVER
HOW OFTEN DO YOU HAVE A DRINK CONTAINING ALCOHOL: MONTHLY OR LESS
DO YOU BELONG TO ANY CLUBS OR ORGANIZATIONS SUCH AS CHURCH GROUPS UNIONS, FRATERNAL OR ATHLETIC GROUPS, OR SCHOOL GROUPS?: NO
IN A TYPICAL WEEK, HOW MANY TIMES DO YOU TALK ON THE PHONE WITH FAMILY, FRIENDS, OR NEIGHBORS?: TWICE A WEEK

## 2024-12-08 ASSESSMENT — LIFESTYLE VARIABLES
AUDIT-C TOTAL SCORE: 1
HOW MANY STANDARD DRINKS CONTAINING ALCOHOL DO YOU HAVE ON A TYPICAL DAY: 1 OR 2
HOW OFTEN DO YOU HAVE SIX OR MORE DRINKS ON ONE OCCASION: NEVER
SKIP TO QUESTIONS 9-10: 1
HOW OFTEN DO YOU HAVE A DRINK CONTAINING ALCOHOL: MONTHLY OR LESS

## 2024-12-10 ENCOUNTER — OFFICE VISIT (OUTPATIENT)
Dept: MEDICAL GROUP | Age: 69
End: 2024-12-10
Payer: MEDICARE

## 2024-12-10 VITALS
HEART RATE: 60 BPM | TEMPERATURE: 97.4 F | OXYGEN SATURATION: 96 % | SYSTOLIC BLOOD PRESSURE: 122 MMHG | HEIGHT: 76 IN | DIASTOLIC BLOOD PRESSURE: 64 MMHG | WEIGHT: 238 LBS | BODY MASS INDEX: 28.98 KG/M2

## 2024-12-10 DIAGNOSIS — N40.1 BENIGN PROSTATIC HYPERPLASIA WITH NOCTURIA: ICD-10-CM

## 2024-12-10 DIAGNOSIS — I77.810 ASCENDING AORTA DILATATION (HCC): ICD-10-CM

## 2024-12-10 DIAGNOSIS — E78.2 MIXED HYPERLIPIDEMIA: ICD-10-CM

## 2024-12-10 DIAGNOSIS — E55.9 VITAMIN D INSUFFICIENCY: ICD-10-CM

## 2024-12-10 DIAGNOSIS — R73.01 IMPAIRED FASTING BLOOD SUGAR: ICD-10-CM

## 2024-12-10 DIAGNOSIS — R35.1 BENIGN PROSTATIC HYPERPLASIA WITH NOCTURIA: ICD-10-CM

## 2024-12-10 DIAGNOSIS — Z12.5 ENCOUNTER FOR SCREENING FOR MALIGNANT NEOPLASM OF PROSTATE: ICD-10-CM

## 2024-12-10 PROCEDURE — 3074F SYST BP LT 130 MM HG: CPT | Performed by: STUDENT IN AN ORGANIZED HEALTH CARE EDUCATION/TRAINING PROGRAM

## 2024-12-10 PROCEDURE — 99214 OFFICE O/P EST MOD 30 MIN: CPT | Performed by: STUDENT IN AN ORGANIZED HEALTH CARE EDUCATION/TRAINING PROGRAM

## 2024-12-10 PROCEDURE — 3078F DIAST BP <80 MM HG: CPT | Performed by: STUDENT IN AN ORGANIZED HEALTH CARE EDUCATION/TRAINING PROGRAM

## 2024-12-10 ASSESSMENT — ENCOUNTER SYMPTOMS
COUGH: 0
DIZZINESS: 0
BLURRED VISION: 0
ORTHOPNEA: 0
BLOOD IN STOOL: 0
FEVER: 0
HEADACHES: 0
BACK PAIN: 0
SHORTNESS OF BREATH: 0
WEAKNESS: 0
DEPRESSION: 0
PALPITATIONS: 0
WHEEZING: 0
BRUISES/BLEEDS EASILY: 0
DOUBLE VISION: 0
CHILLS: 0
ABDOMINAL PAIN: 0

## 2024-12-10 ASSESSMENT — FIBROSIS 4 INDEX: FIB4 SCORE: 2.12

## 2024-12-10 NOTE — PATIENT INSTRUCTIONS
-Continue home meds  -Follow up with Cardiologist  -Repeat lab work in May  -Schedule annual exam after lab work completed    (1) bedfast

## 2024-12-10 NOTE — PROGRESS NOTES
Subjective:     CC: Establish care    HPI:   History of Present Illness  The patient is a 69-year-old male presenting to establish care.    He has been diagnosed with an enlarged ascending aorta, measuring 4.4 cm. He experiences lightheadedness upon standing or initiating ambulation, a symptom he has been managing for some time. His blood pressure readings at home have been consistently low, averaging around 98/60. His resting heart rate, as measured by his previous primary care physician, was approximately 50. He undergoes annual CT scans to monitor the size of his aorta, which has remained stable at 4.4 cm for several years. He is currently on metoprolol and losartan for blood pressure management.    He also has an enlarged prostate, which disrupts his sleep due to frequent urination at night. He maintains a regular exercise routine, working out twice daily, once in the morning and once at night. He is on tamsulosin for his enlarged prostate, which he finds beneficial.    He has a hiatal hernia, which causes irritation at night, but he is not receiving treatment for it. He manages his symptoms by monitoring his diet before bedtime.    He has a lipoma under his skin, which was discovered about 10 years ago and has slightly grown in size. He had a mammogram done at that time.    He sleeps approximately 4 hours per night and has been unable to secure an appointment with a sleep specialist. He has experienced sleepwalking episodes 2 to 3 times over the past 4 to 5 months. He has tried a sleeping pill, which caused sleepwalking.    Supplemental Information  He had hip replacement on both hips, but on one of the surgeries, he had to stay in the hospital because he could not even sit up after surgery. He had a CT scan last week because during the heart one, they found a tree-in-bud formation in his lungs. He goes in for a full body scan every year, and that is how he knows he has the enlarged prostate other than the  "symptoms too. They identified cysts on the kidneys. There is a lesion on his shoulder of unidentified, and they said to just watch it.    MEDICATIONS  metoprolol, losartan, tamsulosin       ROS:  Review of Systems   Constitutional:  Negative for chills, fever and malaise/fatigue.   HENT:  Negative for nosebleeds and tinnitus.    Eyes:  Negative for blurred vision and double vision.   Respiratory:  Negative for cough, shortness of breath and wheezing.    Cardiovascular:  Negative for chest pain, palpitations, orthopnea and leg swelling.   Gastrointestinal:  Negative for abdominal pain, blood in stool and melena.   Genitourinary:  Negative for dysuria and urgency.   Musculoskeletal:  Negative for back pain and joint pain.   Neurological:  Negative for dizziness, weakness and headaches.   Endo/Heme/Allergies:  Does not bruise/bleed easily.   Psychiatric/Behavioral:  Negative for depression and suicidal ideas.        Objective:     Exam:  /64 (BP Location: Right arm, Patient Position: Sitting, BP Cuff Size: Adult)   Pulse 60   Temp 36.3 °C (97.4 °F) (Temporal)   Ht 1.93 m (6' 4\")   Wt 108 kg (238 lb)   SpO2 96%   BMI 28.97 kg/m²  Body mass index is 28.97 kg/m².    Physical Exam  Vitals reviewed.   Constitutional:       General: He is not in acute distress.  HENT:      Head: Normocephalic and atraumatic.      Nose: No congestion.      Mouth/Throat:      Mouth: Mucous membranes are moist.      Pharynx: No oropharyngeal exudate or posterior oropharyngeal erythema.   Eyes:      General: No scleral icterus.     Extraocular Movements: Extraocular movements intact.      Pupils: Pupils are equal, round, and reactive to light.   Cardiovascular:      Rate and Rhythm: Normal rate and regular rhythm.      Pulses: Normal pulses.      Heart sounds: Normal heart sounds. No murmur heard.  Pulmonary:      Effort: Pulmonary effort is normal. No respiratory distress.      Breath sounds: Normal breath sounds. No wheezing. "   Abdominal:      General: Bowel sounds are normal. There is no distension.      Palpations: Abdomen is soft.      Tenderness: There is no abdominal tenderness. There is no guarding or rebound.   Musculoskeletal:      Cervical back: Normal range of motion and neck supple.      Right lower leg: No edema.      Left lower leg: No edema.   Skin:     General: Skin is warm.      Capillary Refill: Capillary refill takes less than 2 seconds.      Coloration: Skin is not jaundiced.      Findings: No bruising or erythema.   Neurological:      General: No focal deficit present.      Mental Status: He is alert.      Cranial Nerves: No cranial nerve deficit.      Sensory: No sensory deficit.   Psychiatric:         Mood and Affect: Mood normal.         Behavior: Behavior normal.       Labs: Reviewed    Assessment & Plan:     69 y.o. male with the following -     1. Mixed hyperlipidemia  -Chronic, stable  -Currently taking Lipitor 20 mg daily  -Medications well-tolerated, no side effects  -Continue same therapy, repeat blood work prior to next visit    - Comp Metabolic Panel; Future  - Lipid Profile; Future  - TSH; Future    2. Ascending aorta dilatation (HCC)  -Chronic, stable  -Recent CT imaging showed mild dilated ascending aorta measuring 4.4 cm which has been stable for years.  -BP is well-controlled patient currently on beta-blocker with heart rate in the 50s  -Continue same therapy, continue following with cardiologist   -Yearly CT scans for Aortic dilation surveillance      3. Impaired fasting blood sugar  -History impaired fasting blood glucose, however an A1c has not been checked  -Recommended to check A1c to screen for prediabetes    - HEMOGLOBIN A1C; Future    4. Benign prostatic hyperplasia with nocturia  He reports nocturia, which impacts his sleep. He is currently taking tamsulosin, which helps manage his symptoms. He is advised to continue with tamsulosin.    5. Vitamin D insufficiency  -Routine screening history  of impaired fasting blood glucose, however there is no records of A1c  - VITAMIN D,25 HYDROXY (DEFICIENCY); Future    6. Encounter for screening for malignant neoplasm of prostate  -Routine screening  - PROSTATE SPECIFIC AG SCREENING; Future     Orthostatic hypotension  His blood pressure readings have been consistently low, averaging around 110s, with occasional drops to 90 or below. This is likely due to his current medication regimen, which includes metoprolol and losartan. He is advised to continue his current medications. He is also advised to avoid excessive water intake in the evenings to help manage his nocturia.    Sleep disturbances.  He reports difficulty sleeping, averaging only 4 hours per night, and has experienced sleepwalking episodes in the last 4-5 months. He is advised to follow up with the sleep medicine referral provided in September.      Return in about 5 months (around 5/10/2025) for Annual, Labs.    Please note that this dictation was created using voice recognition software. I have made every reasonable attempt to correct obvious errors, but I expect that there are errors of grammar and possibly content that I did not discover before finalizing the note.

## 2024-12-11 ENCOUNTER — TELEPHONE (OUTPATIENT)
Dept: HEALTH INFORMATION MANAGEMENT | Facility: OTHER | Age: 69
End: 2024-12-11
Payer: MEDICARE

## 2025-01-20 DIAGNOSIS — I10 PRIMARY HYPERTENSION: ICD-10-CM

## 2025-01-21 RX ORDER — METOPROLOL TARTRATE 25 MG/1
25 TABLET, FILM COATED ORAL 2 TIMES DAILY
Qty: 180 TABLET | Refills: 2 | Status: SHIPPED | OUTPATIENT
Start: 2025-01-21

## 2025-04-09 ENCOUNTER — PATIENT MESSAGE (OUTPATIENT)
Dept: CARDIOLOGY | Facility: MEDICAL CENTER | Age: 70
End: 2025-04-09
Payer: MEDICARE

## 2025-04-10 DIAGNOSIS — E78.5 DYSLIPIDEMIA: ICD-10-CM

## 2025-04-10 RX ORDER — ATORVASTATIN CALCIUM 20 MG/1
20 TABLET, FILM COATED ORAL
Qty: 90 TABLET | Refills: 3 | Status: SHIPPED | OUTPATIENT
Start: 2025-04-10

## 2025-04-10 NOTE — TELEPHONE ENCOUNTER
Received request via: Pharmacy    Was the patient seen in the last year in this department? Yes    Does the patient have an active prescription (recently filled or refills available) for medication(s) requested? No    Pharmacy Name: [facility]    Does the patient have retirement Plus and need 100-day supply? (This applies to ALL medications) Patient does not have SCP

## 2025-04-14 NOTE — PATIENT COMMUNICATION
Brisa Coates P.A.-C. to Mary Ellen Wall R.N.      4/11/25  4:58 PM  Yes. Ok to refill. Should also repeat CMP and lipid panel.

## 2025-04-30 NOTE — OP THERAPY DISCHARGE SUMMARY
Outpatient Physical Therapy  DISCHARGE SUMMARY NOTE      [facility] [address]  [address]  [place] [address]  Phone:  [phone]  Fax:  [phone]    Date of Visit: 10/10/2018    Patient: Tyler Live  YOB: 1955  MRN: [id number]     Referring Provider: ROLAND Mcguire    Referring Diagnosis Neck pain     Physical Therapy Occurrence Codes    Date of onset of impairment:  6/28/18   Date physical therapy care plan established or reviewed:  8/8/18   Date physical therapy treatment started:  8/8/18              Your patient is being discharged from Physical Therapy with the following comments:   · Goals partially met    Comments:  Mr. Live was seen for 7 sessions of PT for chronic neck pain. He was responding to postural exercises, joint mobilization and modalities quite well. His sharp acute pains have gottten better, however the pain can return with walking and being on the elliptical for 25 minutes.     Limitations Remaining:  Pt hasn't rescheduled since his visit on 8-31-18. I feel like his problem is postural in nature.     Recommendations:  DC from treatment at this time.    Vivi Worrell, PT    Date: 10/10/2018   no concerns

## 2025-06-03 ENCOUNTER — APPOINTMENT (OUTPATIENT)
Dept: URBAN - METROPOLITAN AREA CLINIC 4 | Facility: CLINIC | Age: 70
Setting detail: DERMATOLOGY
End: 2025-06-03

## 2025-06-03 DIAGNOSIS — L82.1 OTHER SEBORRHEIC KERATOSIS: ICD-10-CM

## 2025-06-03 DIAGNOSIS — L81.4 OTHER MELANIN HYPERPIGMENTATION: ICD-10-CM

## 2025-06-03 DIAGNOSIS — L57.0 ACTINIC KERATOSIS: ICD-10-CM

## 2025-06-03 DIAGNOSIS — L21.8 OTHER SEBORRHEIC DERMATITIS: ICD-10-CM

## 2025-06-03 DIAGNOSIS — D22 MELANOCYTIC NEVI: ICD-10-CM

## 2025-06-03 DIAGNOSIS — L11.1 TRANSIENT ACANTHOLYTIC DERMATOSIS [GROVER]: ICD-10-CM

## 2025-06-03 DIAGNOSIS — Z85.828 PERSONAL HISTORY OF OTHER MALIGNANT NEOPLASM OF SKIN: ICD-10-CM

## 2025-06-03 DIAGNOSIS — D18.0 HEMANGIOMA: ICD-10-CM

## 2025-06-03 DIAGNOSIS — Z71.89 OTHER SPECIFIED COUNSELING: ICD-10-CM

## 2025-06-03 PROBLEM — D22.62 MELANOCYTIC NEVI OF LEFT UPPER LIMB, INCLUDING SHOULDER: Status: ACTIVE | Noted: 2025-06-03

## 2025-06-03 PROBLEM — D18.01 HEMANGIOMA OF SKIN AND SUBCUTANEOUS TISSUE: Status: ACTIVE | Noted: 2025-06-03

## 2025-06-03 PROBLEM — D22.61 MELANOCYTIC NEVI OF RIGHT UPPER LIMB, INCLUDING SHOULDER: Status: ACTIVE | Noted: 2025-06-03

## 2025-06-03 PROBLEM — D22.5 MELANOCYTIC NEVI OF TRUNK: Status: ACTIVE | Noted: 2025-06-03

## 2025-06-03 PROBLEM — D22.72 MELANOCYTIC NEVI OF LEFT LOWER LIMB, INCLUDING HIP: Status: ACTIVE | Noted: 2025-06-03

## 2025-06-03 PROBLEM — D22.39 MELANOCYTIC NEVI OF OTHER PARTS OF FACE: Status: ACTIVE | Noted: 2025-06-03

## 2025-06-03 PROBLEM — D22.71 MELANOCYTIC NEVI OF RIGHT LOWER LIMB, INCLUDING HIP: Status: ACTIVE | Noted: 2025-06-03

## 2025-06-03 PROCEDURE — ? LIQUID NITROGEN

## 2025-06-03 PROCEDURE — ? MEDICATION COUNSELING

## 2025-06-03 PROCEDURE — ? COUNSELING

## 2025-06-03 PROCEDURE — ? SUNSCREEN RECOMMENDATIONS

## 2025-06-03 PROCEDURE — ? OBSERVATION

## 2025-06-03 ASSESSMENT — LOCATION ZONE DERM
LOCATION ZONE: LEG
LOCATION ZONE: FACE
LOCATION ZONE: ARM
LOCATION ZONE: EAR
LOCATION ZONE: SCALP
LOCATION ZONE: NOSE
LOCATION ZONE: TRUNK

## 2025-06-03 ASSESSMENT — LOCATION SIMPLE DESCRIPTION DERM
LOCATION SIMPLE: SCALP
LOCATION SIMPLE: RIGHT FOREHEAD
LOCATION SIMPLE: POSTERIOR SCALP
LOCATION SIMPLE: LEFT POSTERIOR UPPER ARM
LOCATION SIMPLE: RIGHT UPPER BACK
LOCATION SIMPLE: ABDOMEN
LOCATION SIMPLE: NOSE
LOCATION SIMPLE: LEFT FOREARM
LOCATION SIMPLE: RIGHT POSTERIOR UPPER ARM
LOCATION SIMPLE: RIGHT LOWER BACK
LOCATION SIMPLE: UPPER BACK
LOCATION SIMPLE: RIGHT CHEEK
LOCATION SIMPLE: RIGHT PRETIBIAL REGION
LOCATION SIMPLE: RIGHT FOREARM
LOCATION SIMPLE: LEFT PRETIBIAL REGION
LOCATION SIMPLE: LEFT POSTERIOR THIGH
LOCATION SIMPLE: RIGHT EAR
LOCATION SIMPLE: CHEST
LOCATION SIMPLE: RIGHT POSTERIOR THIGH

## 2025-06-03 ASSESSMENT — LOCATION DETAILED DESCRIPTION DERM
LOCATION DETAILED: LEFT VENTRAL PROXIMAL FOREARM
LOCATION DETAILED: RIGHT SUPERIOR UPPER BACK
LOCATION DETAILED: RIGHT DISTAL POSTERIOR THIGH
LOCATION DETAILED: RIGHT INFERIOR FOREHEAD
LOCATION DETAILED: RIGHT VENTRAL PROXIMAL FOREARM
LOCATION DETAILED: POSTERIOR MID-PARIETAL SCALP
LOCATION DETAILED: LEFT PROXIMAL POSTERIOR UPPER ARM
LOCATION DETAILED: PERIUMBILICAL SKIN
LOCATION DETAILED: LEFT DISTAL POSTERIOR THIGH
LOCATION DETAILED: SUPERIOR THORACIC SPINE
LOCATION DETAILED: RIGHT INFERIOR MEDIAL MIDBACK
LOCATION DETAILED: RIGHT INFERIOR MEDIAL UPPER BACK
LOCATION DETAILED: LEFT SUPERIOR PARIETAL SCALP
LOCATION DETAILED: RIGHT DISTAL POSTERIOR UPPER ARM
LOCATION DETAILED: RIGHT INFERIOR CENTRAL MALAR CHEEK
LOCATION DETAILED: NASAL DORSUM
LOCATION DETAILED: STERNUM
LOCATION DETAILED: LEFT LATERAL PROXIMAL PRETIBIAL REGION
LOCATION DETAILED: INFERIOR THORACIC SPINE
LOCATION DETAILED: RIGHT PROXIMAL PRETIBIAL REGION
LOCATION DETAILED: RIGHT TRAGUS

## 2025-06-03 NOTE — PROCEDURE: LIQUID NITROGEN
Render Note In Bullet Format When Appropriate: No
Show Applicator Variable?: Yes
Post-Care Instructions: I reviewed with the patient in detail post-care instructions. Patient is to wear sunprotection, and avoid picking at any of the treated lesions. Pt may apply Vaseline to crusted or scabbing areas.
Duration Of Freeze Thaw-Cycle (Seconds): 10
Number Of Freeze-Thaw Cycles: 2 freeze-thaw cycles
Consent: The patient's consent was obtained including but not limited to risks of crusting, scabbing, blistering, scarring, darker or lighter pigmentary change, recurrence, incomplete removal and infection. RTC in 2 months if lesion(s) persistent.
Detail Level: Detailed

## 2025-07-04 DIAGNOSIS — I10 PRIMARY HYPERTENSION: ICD-10-CM

## 2025-07-06 DIAGNOSIS — E78.5 DYSLIPIDEMIA: ICD-10-CM

## 2025-07-07 RX ORDER — LOSARTAN POTASSIUM 25 MG/1
25 TABLET ORAL DAILY
Qty: 90 TABLET | Refills: 0 | Status: SHIPPED | OUTPATIENT
Start: 2025-07-07

## 2025-07-07 RX ORDER — ATORVASTATIN CALCIUM 20 MG/1
20 TABLET, FILM COATED ORAL
Qty: 90 TABLET | Refills: 3 | Status: SHIPPED | OUTPATIENT
Start: 2025-07-07

## 2025-07-07 NOTE — TELEPHONE ENCOUNTER
Last OV with AM on 10/3/2024. Last BMP dated 4/16/25 from outside labs with K of 4.2. Immedia message sent to pt with reminder to make annual appt.    Schedulers: Please contact pt to schedule with AM/TT for annual OV appt.Thank you!

## 2025-07-07 NOTE — TELEPHONE ENCOUNTER
Is the patient due for a refill? Yes    Was the patient seen the last 15 months? Yes    Date of last office visit: 10/3/2024    Does the patient have an upcoming appointment?  No       Provider to refill:AM    Does the patient have snf Plus and need 100-day supply? (This applies to ALL medications) Patient does not have SCP

## 2025-07-09 ENCOUNTER — APPOINTMENT (OUTPATIENT)
Dept: URBAN - METROPOLITAN AREA CLINIC 20 | Facility: CLINIC | Age: 70
Setting detail: DERMATOLOGY
End: 2025-07-09

## 2025-08-14 ENCOUNTER — PATIENT MESSAGE (OUTPATIENT)
Dept: MEDICAL GROUP | Age: 70
End: 2025-08-14
Payer: MEDICARE

## 2025-08-14 DIAGNOSIS — N40.0 BENIGN PROSTATIC HYPERPLASIA WITHOUT LOWER URINARY TRACT SYMPTOMS: ICD-10-CM

## 2025-08-19 PROCEDURE — RXMED WILLOW AMBULATORY MEDICATION CHARGE: Performed by: STUDENT IN AN ORGANIZED HEALTH CARE EDUCATION/TRAINING PROGRAM

## 2025-08-19 RX ORDER — TAMSULOSIN HYDROCHLORIDE 0.4 MG/1
0.4 CAPSULE ORAL
Qty: 90 CAPSULE | Refills: 2 | Status: SHIPPED | OUTPATIENT
Start: 2025-08-19

## 2025-08-28 ENCOUNTER — APPOINTMENT (OUTPATIENT)
Dept: URBAN - METROPOLITAN AREA CLINIC 20 | Facility: CLINIC | Age: 70
Setting detail: DERMATOLOGY
End: 2025-08-28

## 2025-08-28 ENCOUNTER — PHARMACY VISIT (OUTPATIENT)
Dept: PHARMACY | Facility: MEDICAL CENTER | Age: 70
End: 2025-08-28
Payer: COMMERCIAL

## 2025-08-28 DIAGNOSIS — L82.1 OTHER SEBORRHEIC KERATOSIS: ICD-10-CM

## 2025-08-28 PROCEDURE — ? LIQUID NITROGEN (COSMETIC)

## 2025-08-28 ASSESSMENT — LOCATION DETAILED DESCRIPTION DERM
LOCATION DETAILED: SUPERIOR LUMBAR SPINE
LOCATION DETAILED: RIGHT SUPERIOR MEDIAL MIDBACK
LOCATION DETAILED: RIGHT DISTAL POSTERIOR UPPER ARM
LOCATION DETAILED: STERNUM
LOCATION DETAILED: LEFT MID-UPPER BACK
LOCATION DETAILED: RIGHT PROXIMAL POSTERIOR UPPER ARM
LOCATION DETAILED: XIPHOID
LOCATION DETAILED: LEFT INFERIOR LATERAL MIDBACK
LOCATION DETAILED: RIGHT LATERAL UPPER BACK
LOCATION DETAILED: RIGHT MID TEMPLE
LOCATION DETAILED: LEFT INFERIOR MEDIAL MIDBACK
LOCATION DETAILED: HAIR
LOCATION DETAILED: RIGHT SUPERIOR TEMPLE
LOCATION DETAILED: LEFT MEDIAL TRAPEZIAL NECK
LOCATION DETAILED: RIGHT SUPERIOR LATERAL MALAR CHEEK
LOCATION DETAILED: RIGHT POSTERIOR SHOULDER
LOCATION DETAILED: LEFT LATERAL ABDOMEN
LOCATION DETAILED: RIGHT ANTERIOR SHOULDER
LOCATION DETAILED: LEFT LATERAL SUPERIOR CHEST
LOCATION DETAILED: LEFT CLAVICULAR NECK
LOCATION DETAILED: LEFT CLAVICULAR SKIN
LOCATION DETAILED: RIGHT SUPERIOR UPPER BACK
LOCATION DETAILED: LEFT INFERIOR LATERAL FOREHEAD
LOCATION DETAILED: RIGHT CLAVICULAR NECK
LOCATION DETAILED: LEFT SUPERIOR UPPER BACK
LOCATION DETAILED: LEFT INFERIOR MEDIAL UPPER BACK
LOCATION DETAILED: PERIUMBILICAL SKIN
LOCATION DETAILED: LEFT PROXIMAL POSTERIOR UPPER ARM
LOCATION DETAILED: LEFT POSTERIOR SHOULDER
LOCATION DETAILED: RIGHT INFERIOR LATERAL UPPER BACK
LOCATION DETAILED: RIGHT LATERAL SUPERIOR CHEST
LOCATION DETAILED: LEFT INFERIOR UPPER BACK
LOCATION DETAILED: LEFT ANTERIOR DISTAL UPPER ARM
LOCATION DETAILED: RIGHT LATERAL INFERIOR CHEST
LOCATION DETAILED: LEFT LATERAL INFERIOR CHEST
LOCATION DETAILED: RIGHT INFERIOR ANTERIOR NECK
LOCATION DETAILED: EPIGASTRIC SKIN

## 2025-08-28 ASSESSMENT — LOCATION SIMPLE DESCRIPTION DERM
LOCATION SIMPLE: RIGHT LOWER BACK
LOCATION SIMPLE: LEFT FOREHEAD
LOCATION SIMPLE: RIGHT UPPER ARM
LOCATION SIMPLE: HAIR
LOCATION SIMPLE: RIGHT TEMPLE
LOCATION SIMPLE: RIGHT UPPER BACK
LOCATION SIMPLE: POSTERIOR NECK
LOCATION SIMPLE: LEFT ANTERIOR NECK
LOCATION SIMPLE: CHEST
LOCATION SIMPLE: LEFT CLAVICULAR SKIN
LOCATION SIMPLE: ABDOMEN
LOCATION SIMPLE: LEFT UPPER BACK
LOCATION SIMPLE: LEFT UPPER ARM
LOCATION SIMPLE: LEFT LOWER BACK
LOCATION SIMPLE: LOWER BACK
LOCATION SIMPLE: RIGHT SHOULDER
LOCATION SIMPLE: RIGHT ANTERIOR NECK
LOCATION SIMPLE: RIGHT CHEEK
LOCATION SIMPLE: LEFT SHOULDER

## 2025-08-28 ASSESSMENT — LOCATION ZONE DERM
LOCATION ZONE: ARM
LOCATION ZONE: NECK
LOCATION ZONE: FACE
LOCATION ZONE: TRUNK
LOCATION ZONE: SCALP